# Patient Record
Sex: FEMALE | Race: WHITE | Employment: FULL TIME | URBAN - METROPOLITAN AREA
[De-identification: names, ages, dates, MRNs, and addresses within clinical notes are randomized per-mention and may not be internally consistent; named-entity substitution may affect disease eponyms.]

---

## 2017-01-25 ENCOUNTER — TELEPHONE (OUTPATIENT)
Dept: PEDIATRICS | Facility: CLINIC | Age: 57
End: 2017-01-25

## 2017-01-25 NOTE — TELEPHONE ENCOUNTER
Pt calling back, updated PHQ over the phone. Sending to  as MORENA.    PHQ-9 ([employer]) 2/4/2016 6/27/2016 8/31/2016 10/19/2016 1/25/2017   PHQ-9 Total Score 4 13 17 17 12     Bruna, RN  Triage Nurse

## 2017-01-25 NOTE — TELEPHONE ENCOUNTER
Panel Management Review      Patient has the following on her problem list:     Depression / Dysthymia review  PHQ-9 SCORE 6/27/2016 8/31/2016 10/19/2016   Total Score - - -   Total Score 13 17 17      Patient is due for:  PHQ9    Diabetes    ASA: Not Required     Last A1C  A1C      7.6   11/14/2016  A1C      6.4   6/23/2016  A1C      7.5   12/11/2015  A1C      7.2   11/11/2015  A1C      7.4   5/4/2015  A1C tested: Failed    Last LDL:    CHOL      114   6/23/2016  HDL       38   6/23/2016  LDL       37   6/23/2016  LDL      110   5/4/2015  TRIG      195   6/23/2016  CHOLHDLRATIO      5.1   5/4/2015  NHDL       76   6/23/2016    Is the patient on a Statin? YES             Is the patient on Aspirin? NO    Medications     HMG CoA Reductase Inhibitors    atorvastatin (LIPITOR) 20 MG tablet          Last three blood pressure readings:  BP Readings from Last 3 Encounters:   11/14/16 110/70   08/31/16 122/74   07/25/16 114/76       Date of last diabetes office visit: 11/14/16     Tobacco History:     History   Smoking status     Current Every Day Smoker -- 0.00 packs/day for 35 years     Types: Cigarettes   Smokeless tobacco     Never Used     Comment: 1/2 PPD - started at age 14             Composite cancer screening  Chart review shows that this patient is due/due soon for the following None  Summary:    Patient is due/failing the following:   PHQ9    Action needed:   Patient needs to do PHQ9.    Type of outreach:    Phone, left message for patient to call back.     Questions for provider review:    None                                                                                                                                    Janina Aldana MA 1/25/2017 3:50 PM       Chart routed to Care Team .

## 2017-01-26 ASSESSMENT — PATIENT HEALTH QUESTIONNAIRE - PHQ9: SUM OF ALL RESPONSES TO PHQ QUESTIONS 1-9: 12

## 2017-05-08 ENCOUNTER — OFFICE VISIT (OUTPATIENT)
Dept: PEDIATRICS | Facility: CLINIC | Age: 57
End: 2017-05-08
Payer: COMMERCIAL

## 2017-05-08 VITALS
WEIGHT: 132.5 LBS | BODY MASS INDEX: 22.08 KG/M2 | OXYGEN SATURATION: 99 % | HEART RATE: 84 BPM | DIASTOLIC BLOOD PRESSURE: 62 MMHG | SYSTOLIC BLOOD PRESSURE: 112 MMHG | RESPIRATION RATE: 14 BRPM | TEMPERATURE: 97.4 F | HEIGHT: 65 IN

## 2017-05-08 DIAGNOSIS — F41.9 ANXIETY: ICD-10-CM

## 2017-05-08 DIAGNOSIS — G43.909 MIGRAINE WITHOUT STATUS MIGRAINOSUS, NOT INTRACTABLE, UNSPECIFIED MIGRAINE TYPE: Primary | ICD-10-CM

## 2017-05-08 PROCEDURE — 96372 THER/PROPH/DIAG INJ SC/IM: CPT | Performed by: NURSE PRACTITIONER

## 2017-05-08 PROCEDURE — 99214 OFFICE O/P EST MOD 30 MIN: CPT | Mod: 25 | Performed by: NURSE PRACTITIONER

## 2017-05-08 RX ORDER — KETOROLAC TROMETHAMINE 30 MG/ML
60 INJECTION, SOLUTION INTRAMUSCULAR; INTRAVENOUS ONCE
Qty: 2 ML | Refills: 0 | OUTPATIENT
Start: 2017-05-08 | End: 2017-05-08

## 2017-05-08 RX ORDER — LORAZEPAM 0.5 MG/1
0.5 TABLET ORAL DAILY
Qty: 8 TABLET | Refills: 0 | Status: SHIPPED | OUTPATIENT
Start: 2017-05-08 | End: 2019-09-18

## 2017-05-08 RX ORDER — NAPROXEN 500 MG/1
500 TABLET ORAL
Qty: 60 TABLET | Refills: 1 | Status: SHIPPED | OUTPATIENT
Start: 2017-05-08 | End: 2019-02-12

## 2017-05-08 NOTE — NURSING NOTE
"Chief Complaint   Patient presents with     Headache       Initial There were no vitals taken for this visit. Estimated body mass index is 23.33 kg/(m^2) as calculated from the following:    Height as of 11/14/16: 5' 4.75\" (1.645 m).    Weight as of 11/14/16: 139 lb 2 oz (63.1 kg).  Medication Reconciliation: complete   Addie Arredondo CMA      "

## 2017-05-08 NOTE — NURSING NOTE
MEDICATION: Ketorolac Tromethamine 60MG/2ML (30 mg/mL) (Toradol)  ROUTE: IM  SITE: LUQ - Gluteus  DOSE: 60mg/2ml  LOT #: [id number]  :  [employer]  EXPIRATION DATE:  02/2018  NDC#: 85749-870-81  Addie Arredondo CMA     Patient tolerated injection well.

## 2017-05-08 NOTE — PROGRESS NOTES
"  SUBJECTIVE:                                                    Bharati Villarreal is a 56 year old female who presents to clinic today for the following health issues:    Headaches      Duration: Intermittent and ongoing since the 90's. Awoke with a migraine today.     Description  Location: Varies as to location, often the left back of the head  Character: Throbbing pain, dull pain, sharp pain, squeezing pain. Photophobic today.   Frequency:  Varies, more frequently than in the past. Pattern has been 4-5 headaches per month. More recently has been experiencing headaches daily.  Some headaches are migraines and some are not migraine type headache. Taking Excedrin Migraine, sumatriptan or ibuprofen almost every day.  Has Tylenol #3 on hand but tries not to take that medication for a migraine.    Duration:  Today's headache began at approximately 0630.    Intensity:  Severe, 4-5/10 today    Accompanying signs and symptoms:    Precipitating or Alleviating factors:  Nausea/vomiting: sometimes  Dizziness: Sometimes. Experiencing mild dizziness today.   Weakness or numbness: sometimes weakness, no numbness  Visual changes: flashing lights and blind spots (scotoma). No visual symptoms today.  Fever: no   Sinus or URI symptoms:  No     History  Head trauma: No  Family history of migraines: YES  Previous tests for headaches: No  Neurologist evaluations: No  Able to do daily activities when headache present: No  Wake with headaches: No   Daily pain medication use: No   Any changes in: None    Precipitating or Alleviating factors (light/sound/sleep/caffeine): None    Therapies tried and outcome:  Excedrin Migraine, Imitrex  Outcome - effective if able to \"Catch\" them on time. Took Excedrin Migraine at 0700 today without reduction of migraine pain.  Frequent/daily pain medication use: Almost daily analgesic use - Excedrin Migraine, Sumatriptan, Tylenol, Ibuprofen or on rare occasion, Tylenol #3.        Abnormal Mood " Symptoms      Duration: Two months    Description:  Depression: No   Anxiety: YES  Panic attacks: no      Accompanying signs and symptoms: see PHQ-9 and CHANO scores    History (similar episodes/previous evaluation): Has a history of anxiety which has been quiescent until recently. No known trigger for worsening anxiety.    Precipitating or alleviating factors: None    Therapies tried and outcome: Ativan (Lorezepam) -Took one tablet each day for 3 days last week.        Problem list and histories reviewed & adjusted, as indicated.  Additional history: as documented    Patient Active Problem List   Diagnosis     Insomnia     GERD (gastroesophageal reflux disease)     Hyperlipidemia with target LDL less than 100     Anxiety     Psoriasis     Tobacco abuse     Moderate episode of recurrent major depressive disorder (H)     Migraine with aura and without status migrainosus, not intractable     Type 2 diabetes mellitus without complication, without long-term current use of insulin (H)     Past Surgical History:   Procedure Laterality Date     ARTHROSCOPY SHOULDER RT/LT      forzen should repair RIGHT     AS ABLATION, ENDOMETRIAL, THERMAL, W/O HYSTEROSCOPIC GUIDANCE       HYSTERECTOMY VAGINAL, BILATERAL SALPINGO-OOPHERECTOMY, COMBINED         Social History   Substance Use Topics     Smoking status: Current Every Day Smoker     Packs/day: 0.00     Years: 35.00     Types: Cigarettes     Smokeless tobacco: Never Used      Comment:  PPD - started at age 14     Alcohol use No     Family History   Problem Relation Age of Onset     DIABETES Other      Cancer - colorectal Father 72      of throat cancer  smoker      CANCER Father      Other Cancer Father      Family History Negative Mother      CANCER Maternal Grandmother      Colon Cancer Paternal Grandmother          Current Outpatient Prescriptions   Medication Sig Dispense Refill     naproxen (NAPROSYN) 500 MG tablet Take 1 tablet (500 mg) by mouth at onset of headache  for moderate pain or headaches 60 tablet 1     LORazepam (ATIVAN) 0.5 MG tablet Take 1 tablet (0.5 mg) by mouth daily 8 tablet 0     zolpidem (AMBIEN) 5 MG tablet Take 1 tablet (5 mg) by mouth nightly as needed for sleep 90 tablet 1     venlafaxine (EFFEXOR-XR) 75 MG 24 hr capsule Take 1 to 2 capsules per day 60 capsule 4     prazosin (MINIPRESS) 1 MG capsule Take 1 capsule at bedtime for 3 days, then increase to 2-3 at bedtime as tolerated 75 capsule 3     nicotine (NICODERM CQ) 14 MG/24HR patch 2h hr Place 1 patch onto the skin every 24 hours 30 patch 0     nicotine (NICODERM CQ) 7 MG/24HR patch 2h hr Place 1 patch onto the skin every 24 hours 30 patch 0     SUMAtriptan (IMITREX) 100 MG tablet Take 1 tablet (100 mg) by mouth at onset of headache for migraine 18 tablet 3     canagliflozin (INVOKANA) 100 MG tablet Take 1 tablet (100 mg) by mouth every morning (before breakfast) 90 tablet 3     omeprazole (PRILOSEC) 20 MG capsule Take 1 capsule (20 mg) by mouth daily 90 capsule 3     atorvastatin (LIPITOR) 20 MG tablet Take 1 tablet (20 mg) by mouth daily 90 tablet 1     metFORMIN (GLUCOPHAGE) 500 MG tablet 1 am / 1 lunch / 2 dinner 360 tablet 1     blood glucose monitoring (NO BRAND SPECIFIED) test strip Use to test blood sugar 3  times daily or as directed.   Use to test 1 strip by in vitro route three times daily 2 Box 11     blood glucose monitoring (TriCipher CONTOUR MONITOR) meter device kit Use to test blood sugars 1 times daily or as directed. 1 kit 0     ACE NOT PRESCRIBED, INTENTIONAL, ACE Inhibitor not prescribed due to Refusal by patient NO PROTEINURIA 0 each 0     acyclovir (ZOVIRAX) 5 % ointment Apply topically 6 times daily 15 g 3     glucose blood VI test strips strip 1 strip by In Vitro route 3 times daily 100 strip 3     IBUPROFEN PM for sleep       Allergies   Allergen Reactions     Sulfa Drugs Rash       ROS:  Constitutional, HEENT, cardiovascular, pulmonary, GI and  systems are negative, except as  "otherwise noted.    OBJECTIVE:                                                    /62  Pulse 84  Temp 97.4  F (36.3  C) (Oral)  Resp 14  Ht 5' 4.75\" (1.645 m)  Wt 132 lb 8 oz (60.1 kg)  SpO2 99%  BMI 22.22 kg/m2  Body mass index is 22.22 kg/(m^2).     GENERAL: Healthy, alert and no distress.  Appears to experiencing pain.  EYES: Eyes grossly normal to inspection, PERRL and conjunctivae and sclerae normal  HENT: Ear canals and TM's normal, nose and mouth without ulcers or lesions  NECK: Supple, no adenopathy, no asymmetry, or masses  RESP: Lungs clear to auscultation - no rales, rhonchi or wheezes  CV: Regular rate and rhythm, normal S1 S2, no S3 or S4, no murmur, click or rub  SKIN: No suspicious lesions or rashes  NEURO: Normal strength and tone, mentation intact, oriented times three and cranial nerves 2-12 intact  PSYCH: Mentation appears normal.    Diagnostic Test Results:  None      ASSESSMENT:                                                    Migraine headache  Anxiety, exacerbation    PLAN:                                                      Ketorolac 60 mg IM injection once - administered by nursing staff member.  Pt reports excellent reduction of migraine headache pain approximately 35 minutes post injection.    Refilled Ativan (lorazepam) for a total of ten (#10) tablets. No refills.  Follow up with PCP in the next week regarding exacerbation of anxiety and increasing frequency of migraines. Emphasized the importance of follow up with PCP.       LAZARO Montoya, CNP  [facility]    "

## 2017-05-10 ENCOUNTER — TELEPHONE (OUTPATIENT)
Dept: PEDIATRICS | Facility: CLINIC | Age: 57
End: 2017-05-10

## 2017-05-10 NOTE — TELEPHONE ENCOUNTER
Faxed forms back to [employer] Fax# [phone]. Mailed original forms back to pt  Janina Aldana MA 5/10/2017 9:25 AM

## 2017-06-06 ENCOUNTER — TELEPHONE (OUTPATIENT)
Dept: PEDIATRICS | Facility: CLINIC | Age: 57
End: 2017-06-06

## 2017-06-06 NOTE — TELEPHONE ENCOUNTER
Panel Management Review      Patient has the following on her problem list:     Depression / Dysthymia review  PHQ-9 SCORE 8/31/2016 10/19/2016 1/25/2017   Total Score - - -   Total Score 17 17 12      Patient is due for:  None    Diabetes    ASA: Not Required     Last A1C  Lab Results   Component Value Date    A1C 7.6 11/14/2016    A1C 6.4 06/23/2016    A1C 7.5 12/11/2015    A1C 7.2 11/11/2015    A1C 7.4 05/04/2015     A1C tested: FAILED    Last LDL:    Lab Results   Component Value Date    CHOL 114 06/23/2016     Lab Results   Component Value Date    HDL 38 06/23/2016     Lab Results   Component Value Date    LDL 37 06/23/2016     Lab Results   Component Value Date    TRIG 195 06/23/2016     Lab Results   Component Value Date    CHOLHDLRATIO 5.1 05/04/2015     Lab Results   Component Value Date    NHDL 76 06/23/2016       Is the patient on a Statin? YES             Is the patient on Aspirin? NO    Medications     HMG CoA Reductase Inhibitors    atorvastatin (LIPITOR) 20 MG tablet          Last three blood pressure readings:  BP Readings from Last 3 Encounters:   05/08/17 112/62   11/14/16 110/70   08/31/16 122/74       Date of last diabetes office visit: 11/14/16     Tobacco History:     History   Smoking Status     Current Every Day Smoker     Packs/day: 0.00     Years: 35.00     Types: Cigarettes   Smokeless Tobacco     Never Used     Comment: 1/2 PPD - started at age 14           Composite cancer screening  Chart review shows that this patient is due/due soon for the following None  Summary:    Patient is due/failing the following:   A1C and FOLLOW UP    Action needed:   Patient needs office visit for Depression Follow up, Diabetes Follow up.    Type of outreach:    Phone, spoke to patient.  States she will call back to schedule an appt    Questions for provider review:    None                                                                                                                                     Janina Aldana MA 6/6/2017 10:29 AM       Chart routed to Close Encounter .

## 2017-06-12 ENCOUNTER — TELEPHONE (OUTPATIENT)
Dept: PEDIATRICS | Facility: CLINIC | Age: 57
End: 2017-06-12

## 2017-06-12 NOTE — TELEPHONE ENCOUNTER
Called, spoke to pt and informed forms needs to be completed by her 's physician. Pt states he has appt with Dr. Freedman tomorrow and will address the forms then. Forms placed in Dr. Freedman's out basket until appt    Janina Aldana MA 6/12/2017 4:11 PM

## 2017-06-19 ENCOUNTER — TELEPHONE (OUTPATIENT)
Dept: PEDIATRICS | Facility: CLINIC | Age: 57
End: 2017-06-19

## 2017-06-19 NOTE — TELEPHONE ENCOUNTER
Called and left message to call clinic back. Please inform pt that Forms are completed best to Dr. Freedman's ability and there are still portions that pt needs to fill out, wondering if pt would like to pick forms up at .    Forms placed in dr. Freedman's out basket until we hear back from pt  Janina Aldana MA 6/19/2017 9:54 AM

## 2017-06-20 NOTE — TELEPHONE ENCOUNTER
Patient returned called, she request to  forms at .  I brought them down to .    Thank you,    Valorie Estrella

## 2017-06-23 ENCOUNTER — OFFICE VISIT (OUTPATIENT)
Dept: PEDIATRICS | Facility: CLINIC | Age: 57
End: 2017-06-23
Payer: COMMERCIAL

## 2017-06-23 VITALS
OXYGEN SATURATION: 96 % | BODY MASS INDEX: 22.19 KG/M2 | HEART RATE: 80 BPM | SYSTOLIC BLOOD PRESSURE: 110 MMHG | DIASTOLIC BLOOD PRESSURE: 66 MMHG | RESPIRATION RATE: 16 BRPM | TEMPERATURE: 97.7 F | WEIGHT: 133.2 LBS | HEIGHT: 65 IN

## 2017-06-23 DIAGNOSIS — H72.92 EARDRUM RUPTURE, LEFT: ICD-10-CM

## 2017-06-23 DIAGNOSIS — H81.10 BPPV (BENIGN PAROXYSMAL POSITIONAL VERTIGO), UNSPECIFIED LATERALITY: Primary | ICD-10-CM

## 2017-06-23 PROCEDURE — 99214 OFFICE O/P EST MOD 30 MIN: CPT | Performed by: PHYSICIAN ASSISTANT

## 2017-06-23 RX ORDER — MECLIZINE HYDROCHLORIDE 25 MG/1
25 TABLET ORAL EVERY 6 HOURS PRN
Qty: 30 TABLET | Refills: 1 | Status: SHIPPED | OUTPATIENT
Start: 2017-06-23 | End: 2019-09-18

## 2017-06-23 RX ORDER — FLUTICASONE PROPIONATE 50 MCG
1-2 SPRAY, SUSPENSION (ML) NASAL DAILY
Qty: 1 BOTTLE | Refills: 11 | Status: SHIPPED | OUTPATIENT
Start: 2017-06-23

## 2017-06-23 RX ORDER — OFLOXACIN 3 MG/ML
SOLUTION/ DROPS OPHTHALMIC
Qty: 1 BOTTLE | Refills: 0 | Status: SHIPPED | OUTPATIENT
Start: 2017-06-23 | End: 2017-06-23

## 2017-06-23 NOTE — PROGRESS NOTES
"  SUBJECTIVE:                                                    Bharati Villarreal is a 57 year old female who presents to clinic today for the following health issues:      Acute Illness   Acute illness concerns: URI for 2 weeks over the last week settled in left ear  Onset:    Fever: no     Chills/Sweats: no     Headache (location?): YES    Sinus Pressure:no    Conjunctivitis:  YES: left-matted    Ear Pain: YES: left ear-ringing, pressure,echo, some lightheadedness with position change    Rhinorrhea: no     Congestion: no     Sore Throat: no      Cough: no    Wheeze: no     Decreased Appetite: YES    Nausea: YES    Vomiting: no     Diarrhea:  no     Dysuria/Freq.: no     Fatigue/Achiness: YES- fatigue    Sick/Strep Exposure: no      Therapies Tried and outcome: none     ROS:  ROS negative except as listed above      OBJECTIVE:     /66 (BP Location: Right arm, Patient Position: Chair, Cuff Size: Adult Regular)  Pulse 80  Temp 97.7  F (36.5  C) (Oral)  Resp 16  Ht 5' 4.75\" (1.645 m)  Wt 133 lb 3.2 oz (60.4 kg)  SpO2 96%  BMI 22.34 kg/m2  Body mass index is 22.34 kg/(m^2).  GENERAL: healthy, alert and minimal distress  EYES: Eyes grossly normal to inspection, PERRL and conjunctivae and sclerae normal  HENT: Left TM ruptured at malleus.  Ear canals normal, nose and mouth without ulcers or lesions  NECK: no adenopathy  RESP: lungs clear to auscultation - no rales, rhonchi or wheezes  CV: regular rate and rhythm  ABDOMEN: soft, nontender, no hepatosplenomegaly, no masses and bowel sounds normal  MS: no gross musculoskeletal defects noted  SKIN: no suspicious lesions or rashes  NEURO: Normal strength and tone, mentation/balance intact and speech normal.  Negative romberg. Episodic left sided vertigo with miko halpike  BACK: no CVA tenderness, no paralumbar tenderness    Diagnostic Test Results:  NONE    ASSESSMENT/PLAN:   (H81.10) BPPV (benign paroxysmal positional vertigo), unspecified laterality  (primary " encounter diagnosis)  Comment: left sided symptoms  Plan: meclizine (ANTIVERT) 25 MG tablet       Exercises provided   Avoid driving etc while symptomatic    (H72.92) Eardrum rupture, left  Comment: No evidence of infection  Plan: OTOLARYNGOLOGY REFERRAL, fluticasone (FLONASE)         50 MCG/ACT spray  Avoid submersion in water         Shaheed Garvey PA-C  [facility]

## 2017-06-23 NOTE — MR AVS SNAPSHOT
After Visit Summary   6/23/2017    Bharati Villarreal    MRN: [id number]           Patient Information     Date Of Birth          1960        Visit Information        Provider Department      6/23/2017 1:00 PM Shaheed Garvey PA-C [facility]        Today's Diagnoses     BPPV (benign paroxysmal positional vertigo), unspecified laterality    -  1    Acute suppurative otitis media of left ear with spontaneous rupture of tympanic membrane, recurrence not specified          Care Instructions      Benign Paroxysmal Positional Vertigo    Benign paroxysmal positional vertigo is a common condition. You feel as if the room is spinning after changing position, moving your head quickly, or even just rolling over in bed.  Vertigo is a false feeling of motion plus disorientation that makes it seem as though the room is spinning. A vertigo attack may cause sudden nausea, vomiting, and heavy sweating. Severe vertigo causes a loss of balance. You may even fall down.  Vertigo is caused by a problem with the inner ear. The inner ear is located behind the middle ear. It is a part of the balance center of the body. It contains small calcium particles within fluid-filled canals (semi-circular canals). These particles can move out of position as a result of aging, head trauma, or disease of the inner ear. Once that happens, moving your head in certain ways may cause the particles to stimulate the inner ear. This creates the feeling of vertigo.  An episode of vertigo may last seconds, minutes, or hours. Once you are over the first episode of vertigo, it may never return. Sometimes symptoms return off and on over several weeks or longer.  Home care  Follow these guidelines when caring for yourself at home:    Rest quietly in bed if your symptoms are severe. Change position slowly. There is usually one position that will feel best. This might be lying on one side or lying on your back with your head  slightly raised on pillows.    Do not drive or work with dangerous machinery for one week after symptoms disappear. This is in case symptoms return suddenly.    Take medicine as prescribed to relieve your symptoms. Unless another medicine was prescribed for nausea, vomiting, and vertigo, you may use over-the-counter motion sickness medicine, such as meclizine or dimenhydrinate.  Follow-up care  Follow up with your healthcare provider, or as directed. Tell your provider about any ringing in the ear or hearing loss.  If you had a CT or MRI scan, a specialist will review it. You will be told of any new findings that may affect your care.  When to seek medical advice  Call your healthcare provider right away if any of these occur:    Vertigo gets worse even after taking prescribed medicine    Repeated vomiting even after taking prescribed medicine    Increased weakness or fainting    Severe headache or unusual drowsiness or confusion    Weakness of an arm or leg or one side of the face    Difficulty walking    Difficulty with speech or vision    Seizure    Trouble hearing    Fever of 100.4 F (38 C) or higher, or as directed by your healthcare provider    Fast heart rate    Chest pain   Date Last Reviewed: 7/10/2015    9094-0831 [employer]. [address] [place] [address]. All rights reserved. This information is not intended as a substitute for professional medical care. Always follow your healthcare professional's instructions.        Labyrinthitis    The inner ear is located behind the middle ear. It is part of the balance center of your body. When the inner ear becomes irritated or inflamed it causes a condition known as labyrinthitis. It may due to a viral infection, but often a cause is not found. Labyrinthitis causes sudden dizziness and balance problems. It often causes a feeling that you or the room is spinning (vertigo). You may feel nauseated or throw up. You may also feel a loss of  balance when trying to walk. Head movement from side to side or changes in body position (from lying to sitting or standing) may worsen symptoms. You may have ringing in the ear. Hearing may also be affected.  An episode of labyrinthitis may last seconds, minutes or hours. It may never return. Or symptoms may recur off and on over several weeks or longer. In many cases, the problem is short-term and goes away when the inner ear issue resolves.  Home care    Take medicine as prescribed to relieve your symptoms. Unless another medicine was prescribed, you can try over-the-counter motion sickness pills. Note that these medicines may cause drowsiness.    If symptoms are severe, rest quietly in bed. Change positions slowly. There may be one position feels best, such as lying on one side or lying on your back with your head slightly raised on pillows.    Vestibular rehabilitation exercises involve moving the head to help resolve problems in the inner ear. If these exercises have been prescribed, do them as you have been instructed.    Do not drive or work with dangerous machinery until one week has passed without symptoms. Be cautious about using stairs.  Follow-up care  Follow up with your healthcare provider or as advised by our staff.  When to seek medical advice  Call your healthcare provider for any of the following:    Symptoms that are not controlled by medicine or that get worse    Repeated vomiting that is not relieved by medicine    Increased weakness or fainting    Headache or unusual drowsiness    Difficulty with vision or speech    Trouble moving the face, arms or legs    Hearing loss    Symptoms persist beyond a few days  Date Last Reviewed: 4/26/2015 2000-2017 [employer]. [address] [place] [address], [place] [address]. All rights reserved. This information is not intended as a substitute for professional medical care. Always follow your healthcare professional's instructions.        Eardrum  Rupture (Perforation)    Your eardrum is a thin membrane between your outer and middle ear. Sound waves entering your ear cause the membrane to vibrate. This helps you hear. An injury or infection can cause your eardrum to tear (rupture). This creates a hole (perforation) that may affect your hearing.  Causes of eardrum perforation  Causes of a ruptured eardrum include:    Pressure from an ear infection    Putting an object such as a cotton swab or pencil into the ear    A very loud noise such as a gunshot close to the ear    Rapid changes in air pressure. These can happen during scuba diving or traveling at high altitudes.    A slap or blow to the ear  When to go to the emergency room (ER)  Seek medical care right away if you:    Have severe pain, bleeding, or ringing in your ear.    Lose your hearing suddenly.    Become very dizzy for no reason.    Have an object lodged in your ear.  A ruptured eardrum from an ear infection usually isn't an emergency. In fact, the rupture often relieves pressure and pain. It usually heals within hours or days. But you should have the ear looked at by a healthcare provider within 24 hours.  What to expect in the ER  Your ear will be examined. Treatment will depend on how severe the damage is. Small holes often heal on their own. A small patch may be placed over a minor eardrum tear. Large tears may need to be repaired during an operation. If you are very dizzy or have severe hearing loss, you are likely to stay in the hospital for treatment for one or more days.  Don't clean inside the ear canal with cotton swabs or any other object.   Date Last Reviewed: 10/1/2016    0992-5671 [employer]. [address] [place] [address]. All rights reserved. This information is not intended as a substitute for professional medical care. Always follow your healthcare professional's instructions.                Follow-ups after your visit        Additional Services      OTOLARYNGOLOGY REFERRAL       Your provider has referred you to: [facility]: [facility] [phone]   [email or web address]    Please be aware that coverage of these services is subject to the terms and limitations of your health insurance plan.  Call member services at your health plan with any benefit or coverage questions.      Please bring the following with you to your appointment:    (1) Any X-Rays, CTs or MRIs which have been performed.  Contact the facility where they were done to arrange for  prior to your scheduled appointment.   (2) List of current medications  (3) This referral request   (4) Any documents/labs given to you for this referral                  Your next 10 appointments already scheduled     Jun 26, 2017  9:30 AM CDT   SHORT with Opal Freedman MD   [facility]    [address] [place] [address] [address]  [facility] [address] [phone]              Who to contact     If you have questions or need follow up information about today's clinic visit or your schedule please contact [facility] directly at [phone].  Normal or non-critical lab and imaging results will be communicated to you by MyChart, letter or phone within 4 business days after the clinic has received the results. If you do not hear from us within 7 days, please contact the clinic through MyChart or phone. If you have a critical or abnormal lab result, we will notify you by phone as soon as possible.  Submit refill requests through [employer] or call your pharmacy and they will forward the refill request to us. Please allow 3 business days for your refill to be completed.          Additional Information About Your Visit        [employer] Information     [employer] gives you secure access to your electronic health record. If you see a primary care provider, you can also send messages to your care  "team and make appointments. If you have questions, please call your primary care clinic.  If you do not have a primary care provider, please call [phone] and they will assist you.        Care EveryWhere ID     This is your Care EveryWhere ID. This could be used by other organizations to access your [facility] medical records  [id number]        Your Vitals Were     Pulse Temperature Respirations Height Pulse Oximetry BMI (Body Mass Index)    80 97.7  F (36.5  C) (Oral) 16 5' 4.75\" (1.645 m) 96% 22.34 kg/m2       Blood Pressure from Last 3 Encounters:   06/23/17 110/66   05/08/17 112/62   11/14/16 110/70    Weight from Last 3 Encounters:   06/23/17 133 lb 3.2 oz (60.4 kg)   05/08/17 132 lb 8 oz (60.1 kg)   11/14/16 139 lb 2 oz (63.1 kg)              We Performed the Following     OTOLARYNGOLOGY REFERRAL          Today's Medication Changes          These changes are accurate as of: 6/23/17  2:10 PM.  If you have any questions, ask your nurse or doctor.               Start taking these medicines.        Dose/Directions    fluticasone 50 MCG/ACT spray   Commonly known as:  FLONASE   Used for:  Acute suppurative otitis media of left ear with spontaneous rupture of tympanic membrane, recurrence not specified   Started by:  Shaheed Garvey PA-C        Dose:  1-2 spray   Spray 1-2 sprays into both nostrils daily   Quantity:  1 Bottle   Refills:  11       meclizine 25 MG tablet   Commonly known as:  ANTIVERT   Used for:  BPPV (benign paroxysmal positional vertigo), unspecified laterality   Started by:  Shaheed Garvey PA-C        Dose:  25 mg   Take 1 tablet (25 mg) by mouth every 6 hours as needed for dizziness   Quantity:  30 tablet   Refills:  1            Where to get your medicines      These medications were sent to [facility] - [address] [facility]   [address], [place] [address]     Phone:  [phone]     fluticasone 50 MCG/ACT spray "    meclizine 25 MG tablet                Primary Care Provider Office Phone # Fax #    Opal Freedman [phone]       [facility] ([facility] [address] [place] [address] [address] [place] [place] [address]        Equal Access to Services     CAROLYN MAYEN : Hadii aad ku hadadrianeo Soomaali, waaxda luqadaha, qaybta kaalmada adeegyada, waxsusan vaughnn chandana giraldo lakoopal ferrara. So [facility] [phone].    ATENCIÓN: Si habla español, tiene a galvan disposición servicios gratuitos de asistencia lingüística. Llame al [phone].    We comply with applicable federal civil rights laws and [place] laws. We do not discriminate on the basis of race, color, national origin, age, disability sex, sexual orientation or gender identity.            Thank you!     [facility]  [facility] your [facility]. Our goal is always to provide you with excellent care. Hearing back from our patients is one way we can continue to improve our services. Please take a few minutes to complete the written survey that you may receive in the mail after your visit with us. Thank you!             Your Updated Medication List - Protect others around you: Learn how to safely use, store and throw away your medicines at www.disposemymeds.org.          This list is accurate as of: 6/23/17  2:10 PM.  Always use your most recent med list.                   Brand Name Dispense Instructions for use Diagnosis    ACE NOT PRESCRIBED (INTENTIONAL)     0 each    ACE Inhibitor not prescribed due to Refusal by patient NO PROTEINURIA    Needs smoking cessation education       acyclovir 5 % ointment    ZOVIRAX    15 g    Apply topically 6 times daily    Cold sore       atorvastatin 20 MG tablet    LIPITOR    90 tablet    Take 1 tablet (20 mg) by mouth daily    Mixed hyperlipidemia       blood glucose monitoring meter device kit     1 kit    Use to test blood sugars 1 times daily or as directed.    Type 2 diabetes mellitus without complication (H)       *  blood glucose monitoring test strip    no brand specified    100 strip    1 strip by In Vitro route 3 times daily    Diabetes mellitus, type 2 (H)       * blood glucose monitoring test strip    no brand specified    2 Box    Use to test blood sugar 3  times daily or as directed.   Use to test 1 strip by in vitro route three times daily    Type 2 diabetes mellitus without complication (H)       canagliflozin 100 MG tablet    INVOKANA    90 tablet    Take 1 tablet (100 mg) by mouth every morning (before breakfast)    Type 2 diabetes mellitus without complication (H)       fluticasone 50 MCG/ACT spray    FLONASE    1 Bottle    Spray 1-2 sprays into both nostrils daily    Acute suppurative otitis media of left ear with spontaneous rupture of tympanic membrane, recurrence not specified       IBUPROFEN      PM for sleep        LORazepam 0.5 MG tablet    ATIVAN    8 tablet    Take 1 tablet (0.5 mg) by mouth daily    Anxiety       meclizine 25 MG tablet    ANTIVERT    30 tablet    Take 1 tablet (25 mg) by mouth every 6 hours as needed for dizziness    BPPV (benign paroxysmal positional vertigo), unspecified laterality       metFORMIN 500 MG tablet    GLUCOPHAGE    360 tablet    TAKE 1 TABLET BY MOUTH EVERY MORNING, TAKE 1 TABLET DAILY WITH LUNCH AND TAKE 2 TABLETS WITH DINNER    Type 2 diabetes mellitus without complication, without long-term current use of insulin (H)       naproxen 500 MG tablet    NAPROSYN    60 tablet    Take 1 tablet (500 mg) by mouth at onset of headache for moderate pain or headaches    Migraine without status migrainosus, not intractable, unspecified migraine type       * nicotine 14 MG/24HR 24 hr patch    NICODERM CQ    30 patch    Place 1 patch onto the skin every 24 hours    Tobacco use disorder       * nicotine 7 MG/24HR 24 hr patch    NICODERM CQ    30 patch    Place 1 patch onto the skin every 24 hours    Tobacco use disorder       omeprazole 20 MG CR capsule    priLOSEC    90 capsule    Take  1 capsule (20 mg) by mouth daily    Gastroesophageal reflux disease without esophagitis       prazosin 1 MG capsule    MINIPRESS    75 capsule    Take 1 capsule at bedtime for 3 days, then increase to 2-3 at bedtime as tolerated    PTSD (post-traumatic stress disorder)       SUMAtriptan 100 MG tablet    IMITREX    18 tablet    Take 1 tablet (100 mg) by mouth at onset of headache for migraine    Migraine with aura and without status migrainosus, not intractable       venlafaxine 75 MG 24 hr capsule    EFFEXOR-XR    60 capsule    Take 1 to 2 capsules per day    Major depressive disorder, recurrent episode, moderate (H)       zolpidem 5 MG tablet    AMBIEN    90 tablet    Take 1 tablet (5 mg) by mouth nightly as needed for sleep    Primary insomnia       * Notice:  This list has 4 medication(s) that are the same as other medications prescribed for you. Read the directions carefully, and ask your doctor or other care provider to review them with you.

## 2017-06-23 NOTE — PATIENT INSTRUCTIONS
Benign Paroxysmal Positional Vertigo    Benign paroxysmal positional vertigo is a common condition. You feel as if the room is spinning after changing position, moving your head quickly, or even just rolling over in bed.  Vertigo is a false feeling of motion plus disorientation that makes it seem as though the room is spinning. A vertigo attack may cause sudden nausea, vomiting, and heavy sweating. Severe vertigo causes a loss of balance. You may even fall down.  Vertigo is caused by a problem with the inner ear. The inner ear is located behind the middle ear. It is a part of the balance center of the body. It contains small calcium particles within fluid-filled canals (semi-circular canals). These particles can move out of position as a result of aging, head trauma, or disease of the inner ear. Once that happens, moving your head in certain ways may cause the particles to stimulate the inner ear. This creates the feeling of vertigo.  An episode of vertigo may last seconds, minutes, or hours. Once you are over the first episode of vertigo, it may never return. Sometimes symptoms return off and on over several weeks or longer.  Home care  Follow these guidelines when caring for yourself at home:    Rest quietly in bed if your symptoms are severe. Change position slowly. There is usually one position that will feel best. This might be lying on one side or lying on your back with your head slightly raised on pillows.    Do not drive or work with dangerous machinery for one week after symptoms disappear. This is in case symptoms return suddenly.    Take medicine as prescribed to relieve your symptoms. Unless another medicine was prescribed for nausea, vomiting, and vertigo, you may use over-the-counter motion sickness medicine, such as meclizine or dimenhydrinate.  Follow-up care  Follow up with your healthcare provider, or as directed. Tell your provider about any ringing in the ear or hearing loss.  If you had a CT  or MRI scan, a specialist will review it. You will be told of any new findings that may affect your care.  When to seek medical advice  Call your healthcare provider right away if any of these occur:    Vertigo gets worse even after taking prescribed medicine    Repeated vomiting even after taking prescribed medicine    Increased weakness or fainting    Severe headache or unusual drowsiness or confusion    Weakness of an arm or leg or one side of the face    Difficulty walking    Difficulty with speech or vision    Seizure    Trouble hearing    Fever of 100.4 F (38 C) or higher, or as directed by your healthcare provider    Fast heart rate    Chest pain   Date Last Reviewed: 7/10/2015    2507-5128 [employer]. [address] [place] [address], [place] [address]. All rights reserved. This information is not intended as a substitute for professional medical care. Always follow your healthcare professional's instructions.        Labyrinthitis    The inner ear is located behind the middle ear. It is part of the balance center of your body. When the inner ear becomes irritated or inflamed it causes a condition known as labyrinthitis. It may due to a viral infection, but often a cause is not found. Labyrinthitis causes sudden dizziness and balance problems. It often causes a feeling that you or the room is spinning (vertigo). You may feel nauseated or throw up. You may also feel a loss of balance when trying to walk. Head movement from side to side or changes in body position (from lying to sitting or standing) may worsen symptoms. You may have ringing in the ear. Hearing may also be affected.  An episode of labyrinthitis may last seconds, minutes or hours. It may never return. Or symptoms may recur off and on over several weeks or longer. In many cases, the problem is short-term and goes away when the inner ear issue resolves.  Home care    Take medicine as prescribed to relieve your symptoms. Unless another medicine  was prescribed, you can try over-the-counter motion sickness pills. Note that these medicines may cause drowsiness.    If symptoms are severe, rest quietly in bed. Change positions slowly. There may be one position feels best, such as lying on one side or lying on your back with your head slightly raised on pillows.    Vestibular rehabilitation exercises involve moving the head to help resolve problems in the inner ear. If these exercises have been prescribed, do them as you have been instructed.    Do not drive or work with dangerous machinery until one week has passed without symptoms. Be cautious about using stairs.  Follow-up care  Follow up with your healthcare provider or as advised by our staff.  When to seek medical advice  Call your healthcare provider for any of the following:    Symptoms that are not controlled by medicine or that get worse    Repeated vomiting that is not relieved by medicine    Increased weakness or fainting    Headache or unusual drowsiness    Difficulty with vision or speech    Trouble moving the face, arms or legs    Hearing loss    Symptoms persist beyond a few days  Date Last Reviewed: 4/26/2015 2000-2017 [employer]. [address] [place] [address]. All rights reserved. This information is not intended as a substitute for professional medical care. Always follow your healthcare professional's instructions.        Eardrum Rupture (Perforation)    Your eardrum is a thin membrane between your outer and middle ear. Sound waves entering your ear cause the membrane to vibrate. This helps you hear. An injury or infection can cause your eardrum to tear (rupture). This creates a hole (perforation) that may affect your hearing.  Causes of eardrum perforation  Causes of a ruptured eardrum include:    Pressure from an ear infection    Putting an object such as a cotton swab or pencil into the ear    A very loud noise such as a gunshot close to the ear    Rapid  changes in air pressure. These can happen during scuba diving or traveling at high altitudes.    A slap or blow to the ear  When to go to the emergency room (ER)  Seek medical care right away if you:    Have severe pain, bleeding, or ringing in your ear.    Lose your hearing suddenly.    Become very dizzy for no reason.    Have an object lodged in your ear.  A ruptured eardrum from an ear infection usually isn't an emergency. In fact, the rupture often relieves pressure and pain. It usually heals within hours or days. But you should have the ear looked at by a healthcare provider within 24 hours.  What to expect in the ER  Your ear will be examined. Treatment will depend on how severe the damage is. Small holes often heal on their own. A small patch may be placed over a minor eardrum tear. Large tears may need to be repaired during an operation. If you are very dizzy or have severe hearing loss, you are likely to stay in the hospital for treatment for one or more days.  Don't clean inside the ear canal with cotton swabs or any other object.   Date Last Reviewed: 10/1/2016    1528-0233 [employer]. [address] [place] [address], [place] [address]. All rights reserved. This information is not intended as a substitute for professional medical care. Always follow your healthcare professional's instructions.

## 2017-06-26 ENCOUNTER — OFFICE VISIT (OUTPATIENT)
Dept: PEDIATRICS | Facility: CLINIC | Age: 57
End: 2017-06-26
Payer: COMMERCIAL

## 2017-06-26 VITALS
HEIGHT: 65 IN | TEMPERATURE: 97.6 F | BODY MASS INDEX: 22.16 KG/M2 | DIASTOLIC BLOOD PRESSURE: 66 MMHG | HEART RATE: 104 BPM | WEIGHT: 133 LBS | OXYGEN SATURATION: 100 % | SYSTOLIC BLOOD PRESSURE: 100 MMHG

## 2017-06-26 DIAGNOSIS — F33.1 MODERATE EPISODE OF RECURRENT MAJOR DEPRESSIVE DISORDER (H): ICD-10-CM

## 2017-06-26 DIAGNOSIS — R42 VERTIGO: ICD-10-CM

## 2017-06-26 DIAGNOSIS — E78.2 MIXED HYPERLIPIDEMIA: ICD-10-CM

## 2017-06-26 DIAGNOSIS — G43.109 MIGRAINE WITH AURA AND WITHOUT STATUS MIGRAINOSUS, NOT INTRACTABLE: ICD-10-CM

## 2017-06-26 DIAGNOSIS — K21.9 GASTROESOPHAGEAL REFLUX DISEASE WITHOUT ESOPHAGITIS: ICD-10-CM

## 2017-06-26 DIAGNOSIS — F51.01 PRIMARY INSOMNIA: ICD-10-CM

## 2017-06-26 DIAGNOSIS — E11.9 TYPE 2 DIABETES MELLITUS WITHOUT COMPLICATION, WITHOUT LONG-TERM CURRENT USE OF INSULIN (H): Primary | ICD-10-CM

## 2017-06-26 LAB
ANION GAP SERPL CALCULATED.3IONS-SCNC: 6 MMOL/L (ref 3–14)
BUN SERPL-MCNC: 15 MG/DL (ref 7–30)
CALCIUM SERPL-MCNC: 9.5 MG/DL (ref 8.5–10.1)
CHLORIDE SERPL-SCNC: 108 MMOL/L (ref 94–109)
CHOLEST SERPL-MCNC: 118 MG/DL
CO2 SERPL-SCNC: 25 MMOL/L (ref 20–32)
CREAT SERPL-MCNC: 0.76 MG/DL (ref 0.52–1.04)
CREAT UR-MCNC: 85 MG/DL
GFR SERPL CREATININE-BSD FRML MDRD: 79 ML/MIN/1.7M2
GLUCOSE SERPL-MCNC: 136 MG/DL (ref 70–99)
HBA1C MFR BLD: 7.5 % (ref 4.3–6)
HDLC SERPL-MCNC: 32 MG/DL
LDLC SERPL CALC-MCNC: 20 MG/DL
MICROALBUMIN UR-MCNC: 12 MG/L
MICROALBUMIN/CREAT UR: 14.05 MG/G CR (ref 0–25)
NONHDLC SERPL-MCNC: 86 MG/DL
POTASSIUM SERPL-SCNC: 4.3 MMOL/L (ref 3.4–5.3)
SODIUM SERPL-SCNC: 139 MMOL/L (ref 133–144)
TRIGL SERPL-MCNC: 330 MG/DL

## 2017-06-26 PROCEDURE — 80048 BASIC METABOLIC PNL TOTAL CA: CPT | Performed by: INTERNAL MEDICINE

## 2017-06-26 PROCEDURE — 82043 UR ALBUMIN QUANTITATIVE: CPT | Performed by: INTERNAL MEDICINE

## 2017-06-26 PROCEDURE — 36415 COLL VENOUS BLD VENIPUNCTURE: CPT | Performed by: INTERNAL MEDICINE

## 2017-06-26 PROCEDURE — 80061 LIPID PANEL: CPT | Performed by: INTERNAL MEDICINE

## 2017-06-26 PROCEDURE — 83036 HEMOGLOBIN GLYCOSYLATED A1C: CPT | Performed by: INTERNAL MEDICINE

## 2017-06-26 PROCEDURE — 99214 OFFICE O/P EST MOD 30 MIN: CPT | Performed by: INTERNAL MEDICINE

## 2017-06-26 RX ORDER — ZOLPIDEM TARTRATE 5 MG/1
5 TABLET ORAL
Qty: 30 TABLET | Refills: 5 | Status: SHIPPED | OUTPATIENT
Start: 2017-06-26 | End: 2017-12-28

## 2017-06-26 RX ORDER — VENLAFAXINE 37.5 MG/1
37.5 TABLET ORAL 2 TIMES DAILY
Qty: 180 TABLET | Refills: 3 | Status: SHIPPED | OUTPATIENT
Start: 2017-06-26 | End: 2017-09-27

## 2017-06-26 RX ORDER — ATORVASTATIN CALCIUM 20 MG/1
20 TABLET, FILM COATED ORAL DAILY
Qty: 90 TABLET | Refills: 3 | Status: SHIPPED | OUTPATIENT
Start: 2017-06-26 | End: 2018-07-01

## 2017-06-26 RX ORDER — SUMATRIPTAN 100 MG/1
100 TABLET, FILM COATED ORAL
Qty: 18 TABLET | Refills: 3 | Status: SHIPPED | OUTPATIENT
Start: 2017-06-26 | End: 2019-02-12

## 2017-06-26 ASSESSMENT — ANXIETY QUESTIONNAIRES
7. FEELING AFRAID AS IF SOMETHING AWFUL MIGHT HAPPEN: MORE THAN HALF THE DAYS
2. NOT BEING ABLE TO STOP OR CONTROL WORRYING: SEVERAL DAYS
5. BEING SO RESTLESS THAT IT IS HARD TO SIT STILL: MORE THAN HALF THE DAYS
3. WORRYING TOO MUCH ABOUT DIFFERENT THINGS: SEVERAL DAYS
GAD7 TOTAL SCORE: 12
1. FEELING NERVOUS, ANXIOUS, OR ON EDGE: MORE THAN HALF THE DAYS
IF YOU CHECKED OFF ANY PROBLEMS ON THIS QUESTIONNAIRE, HOW DIFFICULT HAVE THESE PROBLEMS MADE IT FOR YOU TO DO YOUR WORK, TAKE CARE OF THINGS AT HOME, OR GET ALONG WITH OTHER PEOPLE: SOMEWHAT DIFFICULT
6. BECOMING EASILY ANNOYED OR IRRITABLE: MORE THAN HALF THE DAYS

## 2017-06-26 ASSESSMENT — PATIENT HEALTH QUESTIONNAIRE - PHQ9: 5. POOR APPETITE OR OVEREATING: MORE THAN HALF THE DAYS

## 2017-06-26 NOTE — PROGRESS NOTES
SUBJECTIVE:                                                    Bharati Villarreal is a 57 year old female who presents to clinic today for the following health issues:      Diabetes Follow-up    Patient is checking blood sugars: once daily.  Results are as follows:         suppertime - 125-250    Diabetic concerns: None     Symptoms of hypoglycemia (low blood sugar): none     Paresthesias (numbness or burning in feet) or sores: No     Date of last diabetic eye exam: March 2017       Amount of exercise or physical activity: 6-7 days/week for an average of 15-30 minutes    Problems taking medications regularly: No    Medication side effects: none    Diet: regular (no restrictions)      1. Type 2 DM: Bharati comes in for follow-up of her DM. She reports that she is taking 500mg metformin in the AM and 1000mg in the evening, but can't remember to take her lunch time dosing. She is taking Invokana without significant side effects. Blood sugars typically run 125-150, very occasionally over 200 when she eats more sugars.     2. L ear pain: This is improving with drops and Flonase, but she continues to have some ear pain, as well as ringing in the ear. No hearing loss, but she is still having some vertigo symptoms. She is taking meclizine, which helps, but makes her sleepy. She has an appointment scheduled with ENT later this week. No ear drainage.     Problem list and histories reviewed & adjusted, as indicated.  Additional history: as documented    Patient Active Problem List   Diagnosis     Insomnia     GERD (gastroesophageal reflux disease)     Hyperlipidemia with target LDL less than 100     Anxiety     Psoriasis     Tobacco abuse     Moderate episode of recurrent major depressive disorder (H)     Migraine with aura and without status migrainosus, not intractable     Type 2 diabetes mellitus without complication, without long-term current use of insulin (H)     Past Surgical History:   Procedure Laterality Date      "ARTHROSCOPY SHOULDER RT/LT      forzen should repair RIGHT     AS ABLATION, ENDOMETRIAL, THERMAL, W/O HYSTEROSCOPIC GUIDANCE       HYSTERECTOMY VAGINAL, BILATERAL SALPINGO-OOPHERECTOMY, COMBINED         Social History   Substance Use Topics     Smoking status: Current Every Day Smoker     Packs/day: 0.00     Years: 35.00     Types: Cigarettes     Smokeless tobacco: Never Used      Comment:  PPD - started at age 14     Alcohol use No     Family History   Problem Relation Age of Onset     DIABETES Other      Cancer - colorectal Father 72      of throat cancer  smoker      CANCER Father      Other Cancer Father      Family History Negative Mother      CANCER Maternal Grandmother      Colon Cancer Paternal Grandmother            Reviewed and updated as needed this visit by clinical staff  Tobacco  Allergies  Meds  Med Hx  Fam Hx  Soc Hx        ROS:  Constitutional, HEENT, neuro, endo systems are negative, except as otherwise noted.    OBJECTIVE:     /66 (BP Location: Right arm, Patient Position: Chair, Cuff Size: Adult Regular)  Pulse 104  Temp 97.6  F (36.4  C) (Oral)  Ht 5' 4.75\" (1.645 m)  Wt 133 lb (60.3 kg)  SpO2 100%  BMI 22.3 kg/m2  Body mass index is 22.3 kg/(m^2).  GENERAL: healthy, alert and no distress  EYES: Eyes grossly normal to inspection, PERRL and conjunctivae and sclerae normal. No nystagmus  HENT: normal cephalic/atraumatic and left ear: clear effusion and no evidence of TM rupture    Diagnostic Test Results:  Results for orders placed or performed in visit on 17 (from the past 24 hour(s))   Lipid Profile with reflex to direct LDL   Result Value Ref Range    Cholesterol 118 <200 mg/dL    Triglycerides 330 (H) <150 mg/dL    HDL Cholesterol 32 (L) >49 mg/dL    LDL Cholesterol Calculated 20 <100 mg/dL    Non HDL Cholesterol 86 <130 mg/dL   Hemoglobin A1c   Result Value Ref Range    Hemoglobin A1C 7.5 (H) 4.3 - 6.0 %   Basic metabolic panel  (Ca, Cl, CO2, Creat, Gluc, K, Na, " BUN)   Result Value Ref Range    Sodium 139 133 - 144 mmol/L    Potassium 4.3 3.4 - 5.3 mmol/L    Chloride 108 94 - 109 mmol/L    Carbon Dioxide 25 20 - 32 mmol/L    Anion Gap 6 3 - 14 mmol/L    Glucose 136 (H) 70 - 99 mg/dL    Urea Nitrogen 15 7 - 30 mg/dL    Creatinine 0.76 0.52 - 1.04 mg/dL    GFR Estimate 79 >60 mL/min/1.7m2    GFR Estimate If Black >90   GFR Calc   >60 mL/min/1.7m2    Calcium 9.5 8.5 - 10.1 mg/dL   Albumin Random Urine Quantitative   Result Value Ref Range    Creatinine Urine 85 mg/dL    Albumin Urine mg/L 12 mg/L    Albumin Urine mg/g Cr 14.05 0 - 25 mg/g Cr       ASSESSMENT/PLAN:     1. Type 2 diabetes mellitus without complication, without long-term current use of insulin (H)  HgbA1c uncontrolled. Discussed w/ patient, will increase metformin to 1000mg BID as patient thinks she can remember to take two tabs in AM. Will recheck labs in 3 months, if HgbA1c still >7, will plan to increase Invokana to 300mg daily.   - Lipid Profile with reflex to direct LDL  - Hemoglobin A1c  - Basic metabolic panel  (Ca, Cl, CO2, Creat, Gluc, K, Na, BUN)  - Albumin Random Urine Quantitative  - blood glucose monitoring (NO BRAND SPECIFIED) test strip; Use to test blood sugar 3  times daily or as directed.   Use to test 1 strip by in vitro route three times daily  Dispense: 2 Box; Refill: 11  - metFORMIN (GLUCOPHAGE) 500 MG tablet; Take 2 tablets (1,000 mg) by mouth 2 times daily (with meals)  Dispense: 360 tablet; Refill: 1  - canagliflozin (INVOKANA) 100 MG tablet; Take 1 tablet (100 mg) by mouth every morning (before breakfast)  Dispense: 90 tablet; Refill: 3  - ACE/ARB NOT PRESCRIBED, INTENTIONAL,; Please choose reason not prescribed, below    2. Mixed hyperlipidemia  Due for labs, medications refilled.   - atorvastatin (LIPITOR) 20 MG tablet; Take 1 tablet (20 mg) by mouth daily  Dispense: 90 tablet; Refill: 3    3. Moderate episode of recurrent major depressive disorder (H)  Stable,  medications refilled.   - venlafaxine (EFFEXOR) 37.5 MG tablet; Take 1 tablet (37.5 mg) by mouth 2 times daily  Dispense: 180 tablet; Refill: 3    4. Primary insomnia  Stable. Medications refilled.   - zolpidem (AMBIEN) 5 MG tablet; Take 1 tablet (5 mg) by mouth nightly as needed for sleep  Dispense: 30 tablet; Refill: 5    5. Gastroesophageal reflux disease without esophagitis  - omeprazole (PRILOSEC) 20 MG CR capsule; Take 1 capsule (20 mg) by mouth daily  Dispense: 90 capsule; Refill: 3    6. Migraine with aura and without status migrainosus, not intractable  Stable.   - SUMAtriptan (IMITREX) 100 MG tablet; Take 1 tablet (100 mg) by mouth at onset of headache for migraine  Dispense: 18 tablet; Refill: 3    7. Vertigo  No evidence of TM rupture; will hold otic antibiotics. No nystagmus on exam, but given symptoms or tinnitus and vertigo, some concern for Meniere's or labyrinthitis. Fine to continue intranasal steroids and meclizine, if symptoms persist reasonable to follow-up with ENT as scheduled.       Patient Instructions   Diabetes:  -- increase metformin to 2 tabs twice daily  -- come back in 3 months to recheck labs, if still uncontrolled, then we will increase Invokana dose    Ear issues:  I don't see any ear drum rupture. HOWEVER, with the ringing and hearing issues in addition to vertigo symptoms, we worry about other things like Meniere's disease or labrynthitis  -- OK to stop ear drops  -- follow-up with ENT if symptoms persist  -- Use meclizine as needed  -- Increase Flonase to 2 sprays each nostril twice daily.     Medications refilled!      Opal Dietz MD  [facility]

## 2017-06-26 NOTE — PATIENT INSTRUCTIONS
Diabetes:  -- increase metformin to 2 tabs twice daily  -- come back in 3 months to recheck labs, if still uncontrolled, then we will increase Invokana dose    Ear issues:  I don't see any ear drum rupture. HOWEVER, with the ringing and hearing issues in addition to vertigo symptoms, we worry about other things like Meniere's disease or labrynthitis  -- OK to stop ear drops  -- follow-up with ENT if symptoms persist  -- Use meclizine as needed  -- Increase Flonase to 2 sprays each nostril twice daily.     Medications refilled!

## 2017-06-26 NOTE — NURSING NOTE
"Chief Complaint   Patient presents with     Diabetes       Initial /66 (BP Location: Right arm, Patient Position: Chair, Cuff Size: Adult Regular)  Pulse 104  Temp 97.6  F (36.4  C) (Oral)  Ht 5' 4.75\" (1.645 m)  Wt 133 lb (60.3 kg)  SpO2 100%  BMI 22.3 kg/m2 Estimated body mass index is 22.3 kg/(m^2) as calculated from the following:    Height as of this encounter: 5' 4.75\" (1.645 m).    Weight as of this encounter: 133 lb (60.3 kg).  Medication Reconciliation: complete     Janina Aldana MA 6/26/2017 9:45 AM    "

## 2017-06-26 NOTE — MR AVS SNAPSHOT
After Visit Summary   6/26/2017    Bharati Villarreal    MRN: [id number]           Patient Information     Date Of Birth          1960        Visit Information        Provider Department      6/26/2017 9:30 AM Opal Freedman MD [facility]        Today's Diagnoses     Type 2 diabetes mellitus without complication, without long-term current use of insulin (H)    -  1    Mixed hyperlipidemia        Moderate episode of recurrent major depressive disorder (H)        Primary insomnia        Gastroesophageal reflux disease without esophagitis        Migraine with aura and without status migrainosus, not intractable        Vertigo          Care Instructions    Diabetes:  -- increase metformin to 2 tabs twice daily  -- come back in 3 months to recheck labs, if still uncontrolled, then we will increase Invokana dose    Ear issues:  I don't see any ear drum rupture. HOWEVER, with the ringing and hearing issues in addition to vertigo symptoms, we worry about other things like Meniere's disease or labrynthitis  -- OK to stop ear drops  -- follow-up with ENT if symptoms persist  -- Use meclizine as needed  -- Increase Flonase to 2 sprays each nostril twice daily.     Medications refilled!          Follow-ups after your visit        Who to contact     If you have questions or need follow up information about today's clinic visit or your schedule please contact [facility] directly at [phone].  Normal or non-critical lab and imaging results will be communicated to you by MyChart, letter or phone within 4 business days after the clinic has received the results. If you do not hear from us within 7 days, please contact the clinic through MyChart or phone. If you have a critical or abnormal lab result, we will notify you by phone as soon as possible.  Submit refill requests through [employer] or call your pharmacy and they will forward the refill request to us. Please allow 3 business days for  "your refill to be completed.          Additional Information About Your Visit        [employer] Information     [employer] gives you secure access to your electronic health record. If you see a primary care provider, you can also send messages to your care team and make appointments. If you have questions, please call your primary care clinic.  If you do not have a primary care provider, please call [phone] and they will assist you.        Care EveryWhere ID     This is your Care EveryWhere ID. This could be used by other organizations to access your [facility] medical records  [id number]        Your Vitals Were     Pulse Temperature Height Pulse Oximetry BMI (Body Mass Index)       104 97.6  F (36.4  C) (Oral) 5' 4.75\" (1.645 m) 100% 22.3 kg/m2        Blood Pressure from Last 3 Encounters:   06/26/17 100/66   06/23/17 110/66   05/08/17 112/62    Weight from Last 3 Encounters:   06/26/17 133 lb (60.3 kg)   06/23/17 133 lb 3.2 oz (60.4 kg)   05/08/17 132 lb 8 oz (60.1 kg)              We Performed the Following     Albumin Random Urine Quantitative     Basic metabolic panel  (Ca, Cl, CO2, Creat, Gluc, K, Na, BUN)     Hemoglobin A1c     Lipid Profile with reflex to direct LDL          Today's Medication Changes          These changes are accurate as of: 6/26/17 10:34 AM.  If you have any questions, ask your nurse or doctor.               Start taking these medicines.        Dose/Directions    venlafaxine 37.5 MG tablet   Commonly known as:  EFFEXOR   Used for:  Moderate episode of recurrent major depressive disorder (H)   Replaces:  venlafaxine 75 MG 24 hr capsule   Started by:  Opal Freedman MD        Dose:  37.5 mg   Take 1 tablet (37.5 mg) by mouth 2 times daily   Quantity:  180 tablet   Refills:  3         These medicines have changed or have updated prescriptions.        Dose/Directions    metFORMIN 500 MG tablet   Commonly known as:  GLUCOPHAGE   This may have changed:  See the new instructions.   Used for:  " Type 2 diabetes mellitus without complication, without long-term current use of insulin (H)   Changed by:  Opal Freedman MD        Dose:  1000 mg   Take 2 tablets (1,000 mg) by mouth 2 times daily (with meals)   Quantity:  360 tablet   Refills:  1         Stop taking these medicines if you haven't already. Please contact your care team if you have questions.     prazosin 1 MG capsule   Commonly known as:  MINIPRESS   Stopped by:  Opal Freedman MD           venlafaxine 75 MG 24 hr capsule   Commonly known as:  EFFEXOR-XR   Replaced by:  venlafaxine 37.5 MG tablet   Stopped by:  Opal Freedman MD                Where to get your medicines      These medications were sent to [facility] [address] [place] [address] [address] AT [facility] [address]  [address] [facility] [address]    Hours:  24-hours Phone:  [phone]     atorvastatin 20 MG tablet    blood glucose monitoring test strip    canagliflozin 100 MG tablet    metFORMIN 500 MG tablet    omeprazole 20 MG CR capsule    SUMAtriptan 100 MG tablet    venlafaxine 37.5 MG tablet         Some of these will need a paper prescription and others can be bought over the counter.  Ask your nurse if you have questions.     Bring a paper prescription for each of these medications     zolpidem 5 MG tablet                Primary Care Provider Office Phone # Fax #    Opal Freedman [phone]       [facility] [address] [place] [address]        Equal Access to Services     ROCCO MAYEN AH: Hadii geremias ku hadasho Soomaali, waaxda luqadaha, qaybta kaalmada adeegyada, waxay oly ferrara. So [facility] [phone].    ATENCIÓN: Si habla español, tiene a galvan disposición servicios gratuitos de asistencia lingüística. Llame al [phone].    We comply with applicable federal civil rights laws and [place] laws. We do not discriminate on the basis of race, color, national origin, age,  disability sex, sexual orientation or gender identity.            Thank you!     Thank you for choosing [facility]  for your care. Our goal is always to provide you with excellent care. Hearing back from our patients is one way we can continue to improve our services. Please take a few minutes to complete the written survey that you may receive in the mail after your visit with us. Thank you!             Your Updated Medication List - Protect others around you: Learn how to safely use, store and throw away your medicines at www.disposemymeds.org.          This list is accurate as of: 6/26/17 10:34 AM.  Always use your most recent med list.                   Brand Name Dispense Instructions for use Diagnosis    ACE NOT PRESCRIBED (INTENTIONAL)     0 each    ACE Inhibitor not prescribed due to Refusal by patient NO PROTEINURIA    Needs smoking cessation education       acyclovir 5 % ointment    ZOVIRAX    15 g    Apply topically 6 times daily    Cold sore       atorvastatin 20 MG tablet    LIPITOR    90 tablet    Take 1 tablet (20 mg) by mouth daily    Mixed hyperlipidemia       blood glucose monitoring meter device kit     1 kit    Use to test blood sugars 1 times daily or as directed.    Type 2 diabetes mellitus without complication (H)       * blood glucose monitoring test strip    no brand specified    100 strip    1 strip by In Vitro route 3 times daily    Diabetes mellitus, type 2 (H)       * blood glucose monitoring test strip    no brand specified    2 Box    Use to test blood sugar 3  times daily or as directed.   Use to test 1 strip by in vitro route three times daily    Type 2 diabetes mellitus without complication, without long-term current use of insulin (H)       canagliflozin 100 MG tablet    INVOKANA    90 tablet    Take 1 tablet (100 mg) by mouth every morning (before breakfast)    Type 2 diabetes mellitus without complication, without long-term current use of insulin (H)       fluticasone  50 MCG/ACT spray    FLONASE    1 Bottle    Spray 1-2 sprays into both nostrils daily    Eardrum rupture, left       IBUPROFEN      PM for sleep        LORazepam 0.5 MG tablet    ATIVAN    8 tablet    Take 1 tablet (0.5 mg) by mouth daily    Anxiety       meclizine 25 MG tablet    ANTIVERT    30 tablet    Take 1 tablet (25 mg) by mouth every 6 hours as needed for dizziness    BPPV (benign paroxysmal positional vertigo), unspecified laterality       metFORMIN 500 MG tablet    GLUCOPHAGE    360 tablet    Take 2 tablets (1,000 mg) by mouth 2 times daily (with meals)    Type 2 diabetes mellitus without complication, without long-term current use of insulin (H)       naproxen 500 MG tablet    NAPROSYN    60 tablet    Take 1 tablet (500 mg) by mouth at onset of headache for moderate pain or headaches    Migraine without status migrainosus, not intractable, unspecified migraine type       * nicotine 14 MG/24HR 24 hr patch    NICODERM CQ    30 patch    Place 1 patch onto the skin every 24 hours    Tobacco use disorder       * nicotine 7 MG/24HR 24 hr patch    NICODERM CQ    30 patch    Place 1 patch onto the skin every 24 hours    Tobacco use disorder       omeprazole 20 MG CR capsule    priLOSEC    90 capsule    Take 1 capsule (20 mg) by mouth daily    Gastroesophageal reflux disease without esophagitis       SUMAtriptan 100 MG tablet    IMITREX    18 tablet    Take 1 tablet (100 mg) by mouth at onset of headache for migraine    Migraine with aura and without status migrainosus, not intractable       venlafaxine 37.5 MG tablet    EFFEXOR    180 tablet    Take 1 tablet (37.5 mg) by mouth 2 times daily    Moderate episode of recurrent major depressive disorder (H)       zolpidem 5 MG tablet    AMBIEN    30 tablet    Take 1 tablet (5 mg) by mouth nightly as needed for sleep    Primary insomnia       * Notice:  This list has 4 medication(s) that are the same as other medications prescribed for you. Read the directions  carefully, and ask your doctor or other care provider to review them with you.

## 2017-06-27 ASSESSMENT — ANXIETY QUESTIONNAIRES: GAD7 TOTAL SCORE: 12

## 2017-06-27 ASSESSMENT — PATIENT HEALTH QUESTIONNAIRE - PHQ9: SUM OF ALL RESPONSES TO PHQ QUESTIONS 1-9: 18

## 2017-09-26 DIAGNOSIS — E11.9 TYPE 2 DIABETES MELLITUS WITHOUT COMPLICATION, WITHOUT LONG-TERM CURRENT USE OF INSULIN (H): Primary | ICD-10-CM

## 2017-09-27 ENCOUNTER — OFFICE VISIT (OUTPATIENT)
Dept: PEDIATRICS | Facility: CLINIC | Age: 57
End: 2017-09-27
Payer: COMMERCIAL

## 2017-09-27 VITALS
HEIGHT: 65 IN | HEART RATE: 96 BPM | DIASTOLIC BLOOD PRESSURE: 70 MMHG | SYSTOLIC BLOOD PRESSURE: 114 MMHG | TEMPERATURE: 97.7 F | OXYGEN SATURATION: 99 % | BODY MASS INDEX: 21.58 KG/M2 | WEIGHT: 129.5 LBS

## 2017-09-27 DIAGNOSIS — E11.9 TYPE 2 DIABETES MELLITUS WITHOUT COMPLICATION, WITHOUT LONG-TERM CURRENT USE OF INSULIN (H): Primary | ICD-10-CM

## 2017-09-27 DIAGNOSIS — E11.9 TYPE 2 DIABETES MELLITUS WITHOUT COMPLICATION, WITHOUT LONG-TERM CURRENT USE OF INSULIN (H): ICD-10-CM

## 2017-09-27 DIAGNOSIS — F33.1 MODERATE EPISODE OF RECURRENT MAJOR DEPRESSIVE DISORDER (H): ICD-10-CM

## 2017-09-27 DIAGNOSIS — Z23 NEED FOR VACCINATION: ICD-10-CM

## 2017-09-27 DIAGNOSIS — Z23 NEED FOR PROPHYLACTIC VACCINATION AND INOCULATION AGAINST INFLUENZA: ICD-10-CM

## 2017-09-27 LAB — HBA1C MFR BLD: 7.1 % (ref 4.3–6)

## 2017-09-27 PROCEDURE — 90472 IMMUNIZATION ADMIN EACH ADD: CPT | Performed by: INTERNAL MEDICINE

## 2017-09-27 PROCEDURE — 90736 HZV VACCINE LIVE SUBQ: CPT | Performed by: INTERNAL MEDICINE

## 2017-09-27 PROCEDURE — 99214 OFFICE O/P EST MOD 30 MIN: CPT | Mod: 25 | Performed by: INTERNAL MEDICINE

## 2017-09-27 PROCEDURE — 36415 COLL VENOUS BLD VENIPUNCTURE: CPT | Performed by: INTERNAL MEDICINE

## 2017-09-27 PROCEDURE — 90686 IIV4 VACC NO PRSV 0.5 ML IM: CPT | Performed by: INTERNAL MEDICINE

## 2017-09-27 PROCEDURE — 90471 IMMUNIZATION ADMIN: CPT | Performed by: INTERNAL MEDICINE

## 2017-09-27 PROCEDURE — 83036 HEMOGLOBIN GLYCOSYLATED A1C: CPT | Performed by: INTERNAL MEDICINE

## 2017-09-27 RX ORDER — VENLAFAXINE HYDROCHLORIDE 150 MG/1
150 CAPSULE, EXTENDED RELEASE ORAL DAILY
Qty: 30 CAPSULE | Refills: 1 | COMMUNITY
Start: 2017-09-27 | End: 2017-12-28

## 2017-09-27 ASSESSMENT — ANXIETY QUESTIONNAIRES
GAD7 TOTAL SCORE: 16
5. BEING SO RESTLESS THAT IT IS HARD TO SIT STILL: NEARLY EVERY DAY
6. BECOMING EASILY ANNOYED OR IRRITABLE: NEARLY EVERY DAY
2. NOT BEING ABLE TO STOP OR CONTROL WORRYING: MORE THAN HALF THE DAYS
7. FEELING AFRAID AS IF SOMETHING AWFUL MIGHT HAPPEN: MORE THAN HALF THE DAYS
3. WORRYING TOO MUCH ABOUT DIFFERENT THINGS: MORE THAN HALF THE DAYS
IF YOU CHECKED OFF ANY PROBLEMS ON THIS QUESTIONNAIRE, HOW DIFFICULT HAVE THESE PROBLEMS MADE IT FOR YOU TO DO YOUR WORK, TAKE CARE OF THINGS AT HOME, OR GET ALONG WITH OTHER PEOPLE: EXTREMELY DIFFICULT
1. FEELING NERVOUS, ANXIOUS, OR ON EDGE: MORE THAN HALF THE DAYS

## 2017-09-27 ASSESSMENT — PATIENT HEALTH QUESTIONNAIRE - PHQ9
5. POOR APPETITE OR OVEREATING: MORE THAN HALF THE DAYS
SUM OF ALL RESPONSES TO PHQ QUESTIONS 1-9: 26

## 2017-09-27 NOTE — LETTER
My Depression Action Plan  Name: Bharati Villarreal   Date of Birth 1960  Date: 9/27/2017    My doctor: Opal Freedman   My clinic: [address]  [address]  [facility] [address] [phone]          GREEN    ZONE   Good Control    What it looks like:     Things are going generally well. You have normal up s and down s. You may even feel depressed from time to time, but bad moods usually last less than a day.   What you need to do:  1. Continue to care for yourself (see self care plan)  2. Check your depression survival kit and update it as needed  3. Follow your physician s recommendations including any medication.  4. Do not stop taking medication unless you consult with your physician first.           YELLOW         ZONE Getting Worse    What it looks like:     Depression is starting to interfere with your life.     It may be hard to get out of bed; you may be starting to isolate yourself from others.    Symptoms of depression are starting to last most all day and this has happened for several days.     You may have suicidal thoughts but they are not constant.   What you need to do:     1. Call your care team, your response to treatment will improve if you keep your care team informed of your progress. Yellow periods are signs an adjustment may need to be made.     2. Continue your self-care, even if you have to fake it!    3. Talk to someone in your support network    4. Open up your depression survival kit           RED    ZONE Medical Alert - Get Help    What it looks like:     Depression is seriously interfering with your life.     You may experience these or other symptoms: You can t get out of bed most days, can t work or engage in other necessary activities, you have trouble taking care of basic hygiene, or basic responsibilities, thoughts of suicide or death that will not go away, self-injurious behavior.     What you need to do:  1. Call your care  team and request a same-day appointment. If they are not available (weekends or after hours) call your local crisis line, emergency room or 911.      Electronically signed by: An Aldana, September 27, 2017    Depression Self Care Plan / Survival Kit    Self-Care for Depression  Here s the deal. Your body and mind are really not as separate as most people think.  What you do and think affects how you feel and how you feel influences what you do and think. This means if you do things that people who feel good do, it will help you feel better.  Sometimes this is all it takes.  There is also a place for medication and therapy depending on how severe your depression is, so be sure to consult with your medical provider and/ or Behavioral Health Consultant if your symptoms are worsening or not improving.     In order to better manage my stress, I will:    Exercise  Get some form of exercise, every day. This will help reduce pain and release endorphins, the  feel good  chemicals in your brain. This is almost as good as taking antidepressants!  This is not the same as joining a gym and then never going! (they count on that by the way ) It can be as simple as just going for a walk or doing some gardening, anything that will get you moving.      Hygiene   Maintain good hygiene (Get out of bed in the morning, Make your bed, Brush your teeth, Take a shower, and Get dressed like you were going to work, even if you are unemployed).  If your clothes don't fit try to get ones that do.    Diet  I will strive to eat foods that are good for me, drink plenty of water, and avoid excessive sugar, caffeine, alcohol, and other mood-altering substances.  Some foods that are helpful in depression are: complex carbohydrates, B vitamins, flaxseed, fish or fish oil, fresh fruits and vegetables.    Psychotherapy  I agree to participate in Individual Therapy (if recommended).    Medication  If prescribed medications, I agree to take them.   Missing doses can result in serious side effects.  I understand that drinking alcohol, or other illicit drug use, may cause potential side effects.  I will not stop my medication abruptly without first discussing it with my provider.    Staying Connected With Others  I will stay in touch with my friends, family members, and my primary care provider/team.    Use your imagination  Be creative.  We all have a creative side; it doesn t matter if it s oil painting, sand castles, or mud pies! This will also kick up the endorphins.    Witness Beauty  (AKA stop and smell the roses) Take a look outside, even in mid-winter. Notice colors, textures. Watch the squirrels and birds.     Service to others  Be of service to others.  There is always someone else in need.  By helping others we can  get out of ourselves  and remember the really important things.  This also provides opportunities for practicing all the other parts of the program.    Humor  Laugh and be silly!  Adjust your TV habits for less news and crime-drama and more comedy.    Control your stress  Try breathing deep, massage therapy, biofeedback, and meditation. Find time to relax each day.     My support system    Clinic Contact:  Phone number:    Contact 1:  Phone number:    Contact 2:  Phone number:    Sabianist/:  Phone number:    Therapist:  Phone number:    Local crisis center:    Phone number:    Other community support:  Phone number:

## 2017-09-27 NOTE — PROGRESS NOTES
"  SUBJECTIVE:   Bharati Villarreal is a 57 year old female who presents to clinic today for the following health issues:      Diabetes Follow-up      Patient is checking blood sugars: 1 times a week    Diabetic concerns: None     Symptoms of hypoglycemia (low blood sugar): none     Paresthesias (numbness or burning in feet) or sores: Yes-  tingling      Date of last diabetic eye exam: January 2017    Hyperlipidemia Follow-Up      Rate your low fat/cholesterol diet?: not monitoring fat    Taking statin?  Yes, no muscle aches from statin    Other lipid medications/supplements?:  none      Amount of exercise or physical activity: None lately     Problems taking medications regularly: No    Medication side effects: none  Diet: regular (no restrictions)    Bharati has the following issues today:    1. Depression: Reports that in August, this got markedly worse, she \"hit a wall\". She has been dealing with a lot over the past summer with her 's health issues. Then in July her 28 year old niece committed suicide, which has been particularly challenging for her. At some point, she was looking at a handful of pills and wondering about taking them and realized she needed to get help. Has now been seeing a psychiatrist and psychologist at [facility], increased venlafaxine to 150mg XR daily. Was doing better last week but since Monday this week has been quite depressed and bummed out. Has been working half days at work, which seems to help. At this time no SI. Has an appointment with her psychiatrist later today.     2. Diabetes: Has been taking medication, including metformin 1g BID, religiously. Has also been taking Invokana. Tolerating medications well without side effects. Has been eating pretty healthy, but she does drink Coke pretty regularly. With her worsening mood it has been harder to focus in her diet. Hasn't been able to stop smoking.       Problem list and histories reviewed & adjusted, as indicated.  Additional history: as " "documented    Patient Active Problem List   Diagnosis     Insomnia     GERD (gastroesophageal reflux disease)     Hyperlipidemia with target LDL less than 100     Anxiety     Psoriasis     Tobacco abuse     Moderate episode of recurrent major depressive disorder (H)     Migraine with aura and without status migrainosus, not intractable     Type 2 diabetes mellitus without complication, without long-term current use of insulin (H)     Past Surgical History:   Procedure Laterality Date     ARTHROSCOPY SHOULDER RT/LT      forzen should repair RIGHT     AS ABLATION, ENDOMETRIAL, THERMAL, W/O HYSTEROSCOPIC GUIDANCE       HYSTERECTOMY VAGINAL, BILATERAL SALPINGO-OOPHERECTOMY, COMBINED         Social History   Substance Use Topics     Smoking status: Current Every Day Smoker     Packs/day: 0.00     Years: 35.00     Types: Cigarettes     Smokeless tobacco: Never Used      Comment:  PPD - started at age 14     Alcohol use No     Family History   Problem Relation Age of Onset     DIABETES Other      Cancer - colorectal Father 72      of throat cancer  smoker      CANCER Father      Other Cancer Father      Family History Negative Mother      CANCER Maternal Grandmother      Colon Cancer Paternal Grandmother              Reviewed and updated as needed this visit by clinical staffTobacco  Allergies  Meds  Med Hx  Fam Hx  Soc Hx        ROS:  Constitutional, psych, cardiovascular, pulmonary, gi and endo systems are negative, except as otherwise noted.      OBJECTIVE:   /70 (BP Location: Right arm, Patient Position: Chair, Cuff Size: Adult Regular)  Pulse 96  Temp 97.7  F (36.5  C) (Oral)  Ht 5' 4.75\" (1.645 m)  Wt 129 lb 8 oz (58.7 kg)  SpO2 99%  Breastfeeding? No  BMI 21.72 kg/m2  Body mass index is 21.72 kg/(m^2).  GENERAL: healthy, alert and no distress  PSYCH: mentation appears normal and affect slightly depressed and occasionally tearful during interview.     Diagnostic Test Results:  Results for " orders placed or performed in visit on 09/27/17 (from the past 24 hour(s))   Hemoglobin A1c   Result Value Ref Range    Hemoglobin A1C 7.1 (H) 4.3 - 6.0 %       ASSESSMENT/PLAN:       1. Type 2 diabetes mellitus without complication, without long-term current use of insulin (H)  Improving control with increased dose of metformin; anticipate that she would have HgbA1c under 7 if she was able to cut out soda from diet. Discussed dietary modifications vs increasing Invokana dose; at this time, patient interested in going up on Invokana. Reviewed side effects. Once mood is under better control will readdress dietary changes, tobacco cessation. Continue metformin at current dose.  - canagliflozin (INVOKANA) 300 MG tablet; Take 1 tablet (300 mg) by mouth every morning (before breakfast)  Dispense: 90 tablet; Refill: 1    2. Moderate episode of recurrent major depressive disorder (H)  Recent worsening with multiple life stressors. Patient well established with therapy and psychiatry; meeting her psychiatrist later today to review medication adjustments. Discussed that I'm happy to fill out FMLA if needed; encouraged her to work on getting out of the house regularly for work or for other activities. Contracts for safety.   - venlafaxine (EFFEXOR-XR) 150 MG 24 hr capsule; Take 1 capsule (150 mg) by mouth daily  Dispense: 30 capsule; Refill: 1    3. Need for prophylactic vaccination and inoculation against influenza  - FLU VAC, SPLIT VIRUS IM > 3 YO (QUADRIVALENT) [85479]  - Vaccine Administration, Initial [88927]    Patient Instructions   Continue metformin 1000mg twice daily.    We will increase the Invokana to 300mg daily. Come back in 3 months for labs.    Hang in there with everything. Continue to follow-up with psychiatry and psychology, and let me know if there are things I can do to help (FMLA paperwork, etc).    Work on getting out of the house; either for work or fun.       Opal Dietz MD  [facility]  [facility]  Injectable Influenza Immunization Documentation    1.  Is the person to be vaccinated sick today?   No    2. Does the person to be vaccinated have an allergy to a component   of the vaccine?   No    3. Has the person to be vaccinated ever had a serious reaction   to influenza vaccine in the past?   No    4. Has the person to be vaccinated ever had Guillain-Barré syndrome?   No    Form completed by pt/ Janina Aldana MA 9/27/2017 7:36 AM

## 2017-09-27 NOTE — NURSING NOTE
"Chief Complaint   Patient presents with     Diabetes     Hyperlipidemia     Flu Shot       Initial /70 (BP Location: Right arm, Patient Position: Chair, Cuff Size: Adult Regular)  Pulse 96  Temp 97.7  F (36.5  C) (Oral)  Ht 5' 4.75\" (1.645 m)  Wt 129 lb 8 oz (58.7 kg)  SpO2 99%  Breastfeeding? No  BMI 21.72 kg/m2 Estimated body mass index is 21.72 kg/(m^2) as calculated from the following:    Height as of this encounter: 5' 4.75\" (1.645 m).    Weight as of this encounter: 129 lb 8 oz (58.7 kg).  Medication Reconciliation: complete     Janina Aldana MA 9/27/2017 7:39 AM    "

## 2017-09-27 NOTE — MR AVS SNAPSHOT
After Visit Summary   9/27/2017    Bharati Villarreal    MRN: [id number]           Patient Information     Date Of Birth          1960        Visit Information        Provider Department      9/27/2017 7:50 AM Opal Freedman MD [facility]        Today's Diagnoses     Type 2 diabetes mellitus without complication, without long-term current use of insulin (H)    -  1    Need for prophylactic vaccination and inoculation against influenza          Care Instructions    Continue metformin 1000mg twice daily.    We will increase the Invokana to 300mg daily. Come back in 3 months for labs.    Hang in there with everything. Continue to follow-up with psychiatry and psychology, and let me know if there are things I can do to help (FMLA paperwork, etc).    Work on getting out of the house; either for work or fun.           Follow-ups after your visit        Who to contact     If you have questions or need follow up information about today's clinic visit or your schedule please contact [facility] directly at [phone].  Normal or non-critical lab and imaging results will be communicated to you by Qbixt, letter or phone within 4 business days after the clinic has received the results. If you do not hear from us within 7 days, please contact the clinic through DuckDuckGo or phone. If you have a critical or abnormal lab result, we will notify you by phone as soon as possible.  Submit refill requests through DuckDuckGo or call your pharmacy and they will forward the refill request to us. Please allow 3 business days for your refill to be completed.          Additional Information About Your Visit        Talismahart Information     DuckDuckGo gives you secure access to your electronic health record. If you see a primary care provider, you can also send messages to your care team and make appointments. If you have questions, please call your primary care clinic.  If you do not have a primary care  "provider, please call [phone] and they will assist you.        Care EveryWhere ID     This is your Care EveryWhere ID. This could be used by other organizations to access your [facility] medical records  [id number]        Your Vitals Were     Pulse Temperature Height Pulse Oximetry Breastfeeding? BMI (Body Mass Index)    96 97.7  F (36.5  C) (Oral) 5' 4.75\" (1.645 m) 99% No 21.72 kg/m2       Blood Pressure from Last 3 Encounters:   09/27/17 114/70   06/26/17 100/66   06/23/17 110/66    Weight from Last 3 Encounters:   09/27/17 129 lb 8 oz (58.7 kg)   06/26/17 133 lb (60.3 kg)   06/23/17 133 lb 3.2 oz (60.4 kg)              We Performed the Following     DEPRESSION ACTION PLAN (DAP)     FLU VAC, SPLIT VIRUS IM > 3 YO (QUADRIVALENT) [30181]     Vaccine Administration, Initial [45806]          Today's Medication Changes          These changes are accurate as of: 9/27/17  8:30 AM.  If you have any questions, ask your nurse or doctor.               These medicines have changed or have updated prescriptions.        Dose/Directions    canagliflozin 300 MG tablet   Commonly known as:  INVOKANA   This may have changed:    - medication strength  - how much to take   Used for:  Type 2 diabetes mellitus without complication, without long-term current use of insulin (H)   Changed by:  Opal Freedman MD        Dose:  300 mg   Take 1 tablet (300 mg) by mouth every morning (before breakfast)   Quantity:  90 tablet   Refills:  1            Where to get your medicines      These medications were sent to [id number] - [place], [place] - [address] AT [facility] [address], [facility] [address]    Hours:  24-hours Phone:  [phone]     canagliflozin 300 MG tablet                Primary Care Provider Office Phone # Fax #    Opal Freedman [phone] [address] [place] [address]        Equal Access to Services     CAROLYN MAYEN AH: Vanessa neves " soledad Olivarez, warebekahda luqadaha, qaybta kaalmada librado, ruiz ng ellyharriet dashshawn laLaurajethro josias. So [facility] [phone].    ATENCIÓN: Si habla español, tiene a galvan disposición servicios gratuitos de asistencia lingüística. Lora al [phone].    We comply with applicable federal civil rights laws and [place] laws. We do not discriminate on the basis of race, color, national origin, age, disability sex, sexual orientation or gender identity.            Thank you!     Thank you for choosing [facility]  for your care. Our goal is always to provide you with excellent care. Hearing back from our patients is one way we can continue to improve our services. Please take a few minutes to complete the written survey that you may receive in the mail after your visit with us. Thank you!             Your Updated Medication List - Protect others around you: Learn how to safely use, store and throw away your medicines at www.disposemymeds.org.          This list is accurate as of: 9/27/17  8:30 AM.  Always use your most recent med list.                   Brand Name Dispense Instructions for use Diagnosis    ACE NOT PRESCRIBED (INTENTIONAL)     0 each    ACE Inhibitor not prescribed due to Refusal by patient NO PROTEINURIA    Needs smoking cessation education       ACE/ARB NOT PRESCRIBED (INTENTIONAL)      Please choose reason not prescribed, below    Type 2 diabetes mellitus without complication, without long-term current use of insulin (H)       acyclovir 5 % ointment    ZOVIRAX    15 g    Apply topically 6 times daily    Cold sore       atorvastatin 20 MG tablet    LIPITOR    90 tablet    Take 1 tablet (20 mg) by mouth daily    Mixed hyperlipidemia       blood glucose monitoring meter device kit     1 kit    Use to test blood sugars 1 times daily or as directed.    Type 2 diabetes mellitus without complication (H)       * blood glucose monitoring test strip    no brand specified    100 strip    1 strip by  In Vitro route 3 times daily    Diabetes mellitus, type 2 (H)       * blood glucose monitoring test strip    no brand specified    2 Box    Use to test blood sugar 3  times daily or as directed.   Use to test 1 strip by in vitro route three times daily    Type 2 diabetes mellitus without complication, without long-term current use of insulin (H)       canagliflozin 300 MG tablet    INVOKANA    90 tablet    Take 1 tablet (300 mg) by mouth every morning (before breakfast)    Type 2 diabetes mellitus without complication, without long-term current use of insulin (H)       fluticasone 50 MCG/ACT spray    FLONASE    1 Bottle    Spray 1-2 sprays into both nostrils daily    Eardrum rupture, left       IBUPROFEN      PM for sleep        LORazepam 0.5 MG tablet    ATIVAN    8 tablet    Take 1 tablet (0.5 mg) by mouth daily    Anxiety       meclizine 25 MG tablet    ANTIVERT    30 tablet    Take 1 tablet (25 mg) by mouth every 6 hours as needed for dizziness    BPPV (benign paroxysmal positional vertigo), unspecified laterality       metFORMIN 500 MG tablet    GLUCOPHAGE    360 tablet    Take 2 tablets (1,000 mg) by mouth 2 times daily (with meals)    Type 2 diabetes mellitus without complication, without long-term current use of insulin (H)       naproxen 500 MG tablet    NAPROSYN    60 tablet    Take 1 tablet (500 mg) by mouth at onset of headache for moderate pain or headaches    Migraine without status migrainosus, not intractable, unspecified migraine type       * nicotine 14 MG/24HR 24 hr patch    NICODERM CQ    30 patch    Place 1 patch onto the skin every 24 hours    Tobacco use disorder       * nicotine 7 MG/24HR 24 hr patch    NICODERM CQ    30 patch    Place 1 patch onto the skin every 24 hours    Tobacco use disorder       omeprazole 20 MG CR capsule    priLOSEC    90 capsule    Take 1 capsule (20 mg) by mouth daily    Gastroesophageal reflux disease without esophagitis       SUMAtriptan 100 MG tablet    IMITREX     18 tablet    Take 1 tablet (100 mg) by mouth at onset of headache for migraine    Migraine with aura and without status migrainosus, not intractable       venlafaxine 37.5 MG tablet    EFFEXOR    180 tablet    Take 1 tablet (37.5 mg) by mouth 2 times daily    Moderate episode of recurrent major depressive disorder (H)       zolpidem 5 MG tablet    AMBIEN    30 tablet    Take 1 tablet (5 mg) by mouth nightly as needed for sleep    Primary insomnia       * Notice:  This list has 4 medication(s) that are the same as other medications prescribed for you. Read the directions carefully, and ask your doctor or other care provider to review them with you.

## 2017-09-27 NOTE — NURSING NOTE
1.  Has the patient received the information for the Zostavax vaccine? NO    2.  Does the patient have any of the following contraindications?     Allergy to Neomycin or Gelatin?  No     Current moderate or severe illness?  No  Temp = 97.7  If temp > 99.0 degrees orally or patient has above allergies, vaccine is deferred    3.  The vaccine has been administered in the usual fashion and the patient was instructed to wait 20 minutes before leaving the building in the event of an allergic reaction: YES    Vaccination given by Janina Aldana MA 9/27/2017 8:31 AM    Recorded by An Aldana

## 2017-09-27 NOTE — PATIENT INSTRUCTIONS
Continue metformin 1000mg twice daily.    We will increase the Invokana to 300mg daily. Come back in 3 months for labs.    Hang in there with everything. Continue to follow-up with psychiatry and psychology, and let me know if there are things I can do to help (FMLA paperwork, etc).    Work on getting out of the house; either for work or fun.

## 2017-09-28 ASSESSMENT — ANXIETY QUESTIONNAIRES: GAD7 TOTAL SCORE: 16

## 2017-12-28 ENCOUNTER — OFFICE VISIT (OUTPATIENT)
Dept: PEDIATRICS | Facility: CLINIC | Age: 57
End: 2017-12-28
Payer: COMMERCIAL

## 2017-12-28 VITALS
OXYGEN SATURATION: 97 % | DIASTOLIC BLOOD PRESSURE: 78 MMHG | SYSTOLIC BLOOD PRESSURE: 124 MMHG | HEIGHT: 65 IN | TEMPERATURE: 98.8 F | WEIGHT: 132.1 LBS | BODY MASS INDEX: 22.01 KG/M2 | HEART RATE: 109 BPM

## 2017-12-28 DIAGNOSIS — E11.9 TYPE 2 DIABETES MELLITUS WITHOUT COMPLICATION, WITHOUT LONG-TERM CURRENT USE OF INSULIN (H): Primary | ICD-10-CM

## 2017-12-28 DIAGNOSIS — E11.40 TYPE 2 DIABETES, CONTROLLED, WITH NEUROPATHY (H): ICD-10-CM

## 2017-12-28 DIAGNOSIS — G56.22 LESION OF LEFT ULNAR NERVE: ICD-10-CM

## 2017-12-28 DIAGNOSIS — F33.1 MODERATE EPISODE OF RECURRENT MAJOR DEPRESSIVE DISORDER (H): ICD-10-CM

## 2017-12-28 LAB — HBA1C MFR BLD: 7.4 % (ref 4.3–6)

## 2017-12-28 PROCEDURE — 99214 OFFICE O/P EST MOD 30 MIN: CPT | Performed by: INTERNAL MEDICINE

## 2017-12-28 PROCEDURE — 99207 C FOOT EXAM  NO CHARGE: CPT | Performed by: INTERNAL MEDICINE

## 2017-12-28 PROCEDURE — 36415 COLL VENOUS BLD VENIPUNCTURE: CPT | Performed by: FAMILY MEDICINE

## 2017-12-28 PROCEDURE — 83036 HEMOGLOBIN GLYCOSYLATED A1C: CPT | Performed by: FAMILY MEDICINE

## 2017-12-28 RX ORDER — VENLAFAXINE HYDROCHLORIDE 75 MG/1
225 CAPSULE, EXTENDED RELEASE ORAL DAILY
COMMUNITY
Start: 2017-12-28 | End: 2018-09-12

## 2017-12-28 RX ORDER — MIRTAZAPINE 15 MG/1
15 TABLET, FILM COATED ORAL AT BEDTIME
Qty: 30 TABLET | Refills: 1 | COMMUNITY
Start: 2017-12-28 | End: 2018-09-12

## 2017-12-28 NOTE — NURSING NOTE
"Chief Complaint   Patient presents with     Diabetes       Initial /78 (Cuff Size: Adult Regular)  Pulse 109  Temp 98.8  F (37.1  C)  Ht 5' 4.75\" (1.645 m)  Wt 132 lb 1.6 oz (59.9 kg)  SpO2 97%  BMI 22.15 kg/m2 Estimated body mass index is 22.15 kg/(m^2) as calculated from the following:    Height as of this encounter: 5' 4.75\" (1.645 m).    Weight as of this encounter: 132 lb 1.6 oz (59.9 kg).  Medication Reconciliation: complete   Melody Tellez LPN    "

## 2017-12-28 NOTE — PATIENT INSTRUCTIONS
Work on dietary and exercise changes for the next 3 months, but we may still see your Hemoglobin A1c go up with the mirtazapine, which is fine. If this happens, we will put you on a third medication.     Keep up the great work with all of the progress you are making from a mental health standpoint.     Have your work contact me if they need more information.

## 2017-12-28 NOTE — PROGRESS NOTES
SUBJECTIVE:   Bharati Villarreal is a 57 year old female who presents to clinic today for the following health issues:      Diabetes Follow-up    Patient is checking blood sugars: once daily.  Results are as follows:       bedtime - 153        Diabetic concerns: None     Symptoms of hypoglycemia (low blood sugar): none     Paresthesias (numbness or burning in feet) or sores: No     Date of last diabetic eye exam:     Hyperlipidemia Follow-Up      Rate your low fat/cholesterol diet?: not monitoring fat    Taking statin?  Yes, no muscle aches from statin    Other lipid medications/supplements?:  none    Hypertension Follow-up      Outpatient blood pressures are not being checked.    Low Salt Diet: no added salt  BP Readings from Last 2 Encounters:   17 114/70   17 100/66     Hemoglobin A1C (%)   Date Value   2017 7.4 (H)   2017 7.1 (H)     LDL Cholesterol Calculated (mg/dL)   Date Value   2017 20   2016 37         Amount of exercise or physical activity: None    Problems taking medications regularly: No    Medication side effects: none    Diet: low carbs    Bharati comes in for follow-up of the followin. Type 2 DM: Tolerating Invokana and metformin well without issues. Has been cutting back on her soda intake. Did eat a lot of sugar cookies over the [place] holidays, reports that blood sugars typically run in 150s. No issues with low blood sugars. Tolerating medications well without issues.     2. Depression: Is getting ready to go back to work. Has been working closely with psychiatrist on medication management, and overall feels that things are improving. Was worse over the summer and fall with her niece committing suicide and her partners recent health issues. Does feel a bit overwhelmed about the idea of going back to work but thinks that she will be able to do it.     3. Needs a prescription for standing desk at work. Reports that her L arm will start to go numb,  "including pinky and ring finger, if she is working at her desk for too long. Thinks a standing desk would help.   Problem list and histories reviewed & adjusted, as indicated.  Additional history: as documented    Patient Active Problem List   Diagnosis     Insomnia     GERD (gastroesophageal reflux disease)     Hyperlipidemia with target LDL less than 100     Anxiety     Psoriasis     Tobacco abuse     Moderate episode of recurrent major depressive disorder (H)     Migraine with aura and without status migrainosus, not intractable     Type 2 diabetes mellitus without complication, without long-term current use of insulin (H)     Past Surgical History:   Procedure Laterality Date     ARTHROSCOPY SHOULDER RT/LT      forzen should repair RIGHT     AS ABLATION, ENDOMETRIAL, THERMAL, W/O HYSTEROSCOPIC GUIDANCE       HYSTERECTOMY VAGINAL, BILATERAL SALPINGO-OOPHERECTOMY, COMBINED         Social History   Substance Use Topics     Smoking status: Current Every Day Smoker     Packs/day: 0.00     Years: 35.00     Types: Cigarettes     Smokeless tobacco: Never Used      Comment:  PPD - started at age 14     Alcohol use No     Family History   Problem Relation Age of Onset     DIABETES Other      Cancer - colorectal Father 72      of throat cancer  smoker      CANCER Father      Other Cancer Father      Family History Negative Mother      CANCER Maternal Grandmother      Colon Cancer Paternal Grandmother              Reviewed and updated as needed this visit by clinical staffTobacco  Allergies  Meds  Med Hx  Surg Hx  Fam Hx  Soc Hx        ROS:  Constitutional, MSK, neuro, endo, psych systems are negative, except as otherwise noted.      OBJECTIVE:   /78 (Cuff Size: Adult Regular)  Pulse 109  Temp 98.8  F (37.1  C)  Ht 5' 4.75\" (1.645 m)  Wt 132 lb 1.6 oz (59.9 kg)  SpO2 97%  BMI 22.15 kg/m2  Body mass index is 22.15 kg/(m^2).  GENERAL: healthy, alert and no distress  PSYCH: mentation appears normal, " affect normal/bright  Diabetic foot exam: normal DP and PT pulses, no trophic changes or ulcerative lesions, normal sensory exam and normal monofilament exam    Diagnostic Test Results:  Results for orders placed or performed in visit on 12/28/17   Hemoglobin A1c   Result Value Ref Range    Hemoglobin A1C 7.4 (H) 4.3 - 6.0 %       ASSESSMENT/PLAN:     1. Type 2 diabetes mellitus without complication, without long-term current use of insulin (H)  A1c recently worsening, unsure if this is due to disease progression vs dietary indiscretions with the holidays. Patient recently increased Invokana dose, but this does not seem to have helps significantly. Discussed adding additional medication vs working on dietary changes and following up in 3 months. She would like to work on lifestyle modifications at this time.   - FOOT EXAM  - Hemoglobin A1c; Future    2. Moderate episode of recurrent major depressive disorder (H)  Follows with psychiatry, planning to return to work. Currently on venlafaxine and mirtazapine through psych, feels that symptoms are relatively stable.   - venlafaxine (EFFEXOR-XR) 75 MG 24 hr capsule; Take 3 capsules (225 mg) by mouth daily    3. Lesion of left ulnar nerve  Discussed that standing desk may ore may not help ulnar neuropathy but she would like to try. Letter written.   - order for DME; Equipment being ordered: standing desk  Dispense: 1 each; Refill: 0    Patient Instructions   Work on dietary and exercise changes for the next 3 months, but we may still see your Hemoglobin A1c go up with the mirtazapine, which is fine. If this happens, we will put you on a third medication.     Keep up the great work with all of the progress you are making from a mental health standpoint.     Have your work contact me if they need more information.       Opal Dietz MD  [facility]

## 2017-12-28 NOTE — MR AVS SNAPSHOT
After Visit Summary   12/28/2017    Bharati Villarreal    MRN: [id number]           Patient Information     Date Of Birth          1960        Visit Information        Provider Department      12/28/2017 11:50 AM Opal Freedman MD [facility]        Today's Diagnoses     Type 2 diabetes mellitus without complication, without long-term current use of insulin (H)    -  1    Moderate episode of recurrent major depressive disorder (H)        Lesion of left ulnar nerve          Care Instructions    Work on dietary and exercise changes for the next 3 months, but we may still see your Hemoglobin A1c go up with the mirtazapine, which is fine. If this happens, we will put you on a third medication.     Keep up the great work with all of the progress you are making from a mental health standpoint.     Have your work contact me if they need more information.           Follow-ups after your visit        Who to contact     If you have questions or need follow up information about today's clinic visit or your schedule please contact [facility] directly at [phone].  Normal or non-critical lab and imaging results will be communicated to you by Fuel3Dhart, letter or phone within 4 business days after the clinic has received the results. If you do not hear from us within 7 days, please contact the clinic through Tuteet or phone. If you have a critical or abnormal lab result, we will notify you by phone as soon as possible.  Submit refill requests through [employer] or call your pharmacy and they will forward the refill request to us. Please allow 3 business days for your refill to be completed.          Additional Information About Your Visit        Fuel3Dhart Information     [employer] gives you secure access to your electronic health record. If you see a primary care provider, you can also send messages to your care team and make appointments. If you have questions, please call your primary care  "clinic.  If you do not have a primary care provider, please call [phone] and they will assist you.        Care EveryWhere ID     This is your Care EveryWhere ID. This could be used by other organizations to access your [facility] medical records  [id number]        Your Vitals Were     Pulse Temperature Height Pulse Oximetry BMI (Body Mass Index)       109 98.8  F (37.1  C) 5' 4.75\" (1.645 m) 97% 22.15 kg/m2        Blood Pressure from Last 3 Encounters:   12/28/17 124/78   09/27/17 114/70   06/26/17 100/66    Weight from Last 3 Encounters:   12/28/17 132 lb 1.6 oz (59.9 kg)   09/27/17 129 lb 8 oz (58.7 kg)   06/26/17 133 lb (60.3 kg)              We Performed the Following     FOOT EXAM          Today's Medication Changes          These changes are accurate as of: 12/28/17 11:50 AM.  If you have any questions, ask your nurse or doctor.               Start taking these medicines.        Dose/Directions    order for DME   Used for:  Lesion of left ulnar nerve   Started by:  Opal Freedman MD        Equipment being ordered: standing desk   Quantity:  1 each   Refills:  0         These medicines have changed or have updated prescriptions.        Dose/Directions    venlafaxine 75 MG 24 hr capsule   Commonly known as:  EFFEXOR-XR   This may have changed:    - medication strength  - how much to take   Used for:  Moderate episode of recurrent major depressive disorder (H)   Changed by:  Opal Freedman MD        Dose:  225 mg   Take 3 capsules (225 mg) by mouth daily   Refills:  0         Stop taking these medicines if you haven't already. Please contact your care team if you have questions.     zolpidem 5 MG tablet   Commonly known as:  AMBIEN   Stopped by:  Opal Freedman MD                Where to get your medicines      Some of these will need a paper prescription and others can be bought over the counter.  Ask your nurse if you have questions.     Bring a paper prescription for each of these medications     " order for DME                Primary Care Provider Office Phone # Fax #    Opal Freedman [phone] [address] [place] [address]        Equal Access to Services     GIOVANIROCCO MARCUSMATHEUS : Hadailyn geremias neves ollieo Juanis, waaxda luqadaha, qaybta kaalmada librado, ruiz linoopal josias. So [facility] [phone].    ATENCIÓN: Si habla español, tiene a galvan disposición servicios gratuitos de asistencia lingüística. Llame al [phone].    We comply with applicable federal civil rights laws and [place] laws. We do not discriminate on the basis of race, color, national origin, age, disability, sex, sexual orientation, or gender identity.            Thank you!     Thank you for choosing [facility]  for your care. Our goal is always to provide you with excellent care. Hearing back from our patients is one way we can continue to improve our services. Please take a few minutes to complete the written survey that you may receive in the mail after your visit with us. Thank you!             Your Updated Medication List - Protect others around you: Learn how to safely use, store and throw away your medicines at www.disposemymeds.org.          This list is accurate as of: 12/28/17 11:50 AM.  Always use your most recent med list.                   Brand Name Dispense Instructions for use Diagnosis    ACE NOT PRESCRIBED (INTENTIONAL)     0 each    ACE Inhibitor not prescribed due to Refusal by patient NO PROTEINURIA    Needs smoking cessation education       ACE/ARB/ARNI NOT PRESCRIBED (INTENTIONAL)      Please choose reason not prescribed, below    Type 2 diabetes mellitus without complication, without long-term current use of insulin (H)       acyclovir 5 % ointment    ZOVIRAX    15 g    Apply topically 6 times daily    Cold sore       atorvastatin 20 MG tablet    LIPITOR    90 tablet    Take 1 tablet (20 mg) by mouth daily    Mixed hyperlipidemia       blood glucose  monitoring meter device kit     1 kit    Use to test blood sugars 1 times daily or as directed.    Type 2 diabetes mellitus without complication (H)       * blood glucose monitoring test strip    no brand specified    100 strip    1 strip by In Vitro route 3 times daily    Diabetes mellitus, type 2 (H)       * blood glucose monitoring test strip    no brand specified    2 Box    Use to test blood sugar 3  times daily or as directed.   Use to test 1 strip by in vitro route three times daily    Type 2 diabetes mellitus without complication, without long-term current use of insulin (H)       canagliflozin 300 MG tablet    INVOKANA    90 tablet    Take 1 tablet (300 mg) by mouth every morning (before breakfast)    Type 2 diabetes mellitus without complication, without long-term current use of insulin (H)       fluticasone 50 MCG/ACT spray    FLONASE    1 Bottle    Spray 1-2 sprays into both nostrils daily    Eardrum rupture, left       IBUPROFEN      PM for sleep        LORazepam 0.5 MG tablet    ATIVAN    8 tablet    Take 1 tablet (0.5 mg) by mouth daily    Anxiety       meclizine 25 MG tablet    ANTIVERT    30 tablet    Take 1 tablet (25 mg) by mouth every 6 hours as needed for dizziness    BPPV (benign paroxysmal positional vertigo), unspecified laterality       metFORMIN 500 MG tablet    GLUCOPHAGE    360 tablet    Take 2 tablets (1,000 mg) by mouth 2 times daily (with meals)    Type 2 diabetes mellitus without complication, without long-term current use of insulin (H)       mirtazapine 15 MG tablet    REMERON    30 tablet    Take 1 tablet (15 mg) by mouth At Bedtime        naproxen 500 MG tablet    NAPROSYN    60 tablet    Take 1 tablet (500 mg) by mouth at onset of headache for moderate pain or headaches    Migraine without status migrainosus, not intractable, unspecified migraine type       * nicotine 14 MG/24HR 24 hr patch    NICODERM CQ    30 patch    Place 1 patch onto the skin every 24 hours    Tobacco use  disorder       * nicotine 7 MG/24HR 24 hr patch    NICODERM CQ    30 patch    Place 1 patch onto the skin every 24 hours    Tobacco use disorder       omeprazole 20 MG CR capsule    priLOSEC    90 capsule    Take 1 capsule (20 mg) by mouth daily    Gastroesophageal reflux disease without esophagitis       order for DME     1 each    Equipment being ordered: standing desk    Lesion of left ulnar nerve       SUMAtriptan 100 MG tablet    IMITREX    18 tablet    Take 1 tablet (100 mg) by mouth at onset of headache for migraine    Migraine with aura and without status migrainosus, not intractable       venlafaxine 75 MG 24 hr capsule    EFFEXOR-XR     Take 3 capsules (225 mg) by mouth daily    Moderate episode of recurrent major depressive disorder (H)       * Notice:  This list has 4 medication(s) that are the same as other medications prescribed for you. Read the directions carefully, and ask your doctor or other care provider to review them with you.

## 2018-01-10 ENCOUNTER — TELEPHONE (OUTPATIENT)
Dept: PEDIATRICS | Facility: CLINIC | Age: 58
End: 2018-01-10

## 2018-01-10 NOTE — TELEPHONE ENCOUNTER
Faxed FMLA forms to [employer] Fax# [phone]. Placed a copy in abstracting. Also mailed original copy back to back along with an extra copy per pt's request. Called and left message informing pt that forms are completed and faxed, and that original were mailed to her home.    Janina Aldana MA 1/10/2018 4:41 PM

## 2018-01-10 NOTE — TELEPHONE ENCOUNTER
Forms placed in Dr. Freedman's in basket to be completed. Called, spoke to pt and informed waiting to be completed and will call when faxed. Pt verbalized understanding with no further questions or concerns.   Janina Aldana MA 1/10/2018 10:50 AM

## 2018-01-10 NOTE — TELEPHONE ENCOUNTER
Patient is calling to check on status of FMLA forms-these were sent about two weeks ago.  Have you received them?  Please call Bharati at [phone].  I did give Bharati the fax # to [employer] also.    Hank will drop off forms.  Needs to be completed by Monday  KIRK Herzog RN

## 2018-03-11 DIAGNOSIS — E11.9 TYPE 2 DIABETES MELLITUS WITHOUT COMPLICATION, WITHOUT LONG-TERM CURRENT USE OF INSULIN (H): ICD-10-CM

## 2018-03-12 NOTE — TELEPHONE ENCOUNTER
Requested Prescriptions   Pending Prescriptions Disp Refills     metFORMIN (GLUCOPHAGE) 500 MG tablet [Pharmacy Med Name: METFORMIN 500MG TABLETS]    Last Written Prescription Date:  6/26/2017  Last Fill Quantity: 360,  # refills: 1   Last office visit: 12/28/2017 with prescribing provider:  Opal Freedman     Future Office Visit:   Next 5 appointments (look out 90 days)     Mar 15, 2018  3:10 PM CDT   [facility] with [facility]al [facility]    [address] [place] [address]  [address]  [facility] [address] [phone]                  360 tablet 0     Sig: TAKE 2 TABLETS(1000 MG) BY MOUTH TWICE DAILY WITH MEALS    Biguanide Agents Passed    3/11/2018  1:30 PM       Passed - Blood pressure less than 140/90 in past 6 months    BP Readings from Last 3 Encounters:   12/28/17 124/78   09/27/17 114/70   06/26/17 100/66                Passed - Patient has documented LDL within the past 12 mos.    Recent Labs   Lab Test  06/26/17 0924   LDL  20            Passed - Patient has had a Microalbumin in the past 12 mos.    Recent Labs   Lab Test  06/26/17 0924 08/08/13   MICROALB   --    --   15.6   MICROL  12   < >   --    UMALCR  14.05   < >  7.6    < > = values in this interval not displayed.            Passed - Patient is age 10 or older       Passed - Patient has documented A1c within the specified period of time.    Recent Labs   Lab Test  12/28/17   1052   A1C  7.4*            Passed - Patient's CR is NOT>1.4 OR Patient's EGFR is NOT<45 within past 12 mos.    Recent Labs   Lab Test  06/26/17 0924   GFRESTIMATED  79   GFRESTBLACK  >90   GFR Calc         Recent Labs   Lab Test  06/26/17 0924   CR  0.76            Passed - Patient does NOT have a diagnosis of CHF.       Passed - Patient is not pregnant       Passed - Patient has not had a positive pregnancy test within the past 12 mos.        Passed - Recent (6 mo) or future (30 days) visit within the  "authorizing provider's specialty    Patient had office visit in the last 6 months or has a visit in the next 30 days with authorizing provider or within the authorizing provider's specialty.  See \"Patient Info\" tab in inbasket, or \"Choose Columns\" in Meds & Orders section of the refill encounter.              "

## 2018-03-13 NOTE — TELEPHONE ENCOUNTER
Prescription approved per [facility] Refill Protocol.    Elisa Milligan RN

## 2018-03-15 ENCOUNTER — OFFICE VISIT (OUTPATIENT)
Dept: PEDIATRICS | Facility: CLINIC | Age: 58
End: 2018-03-15
Payer: COMMERCIAL

## 2018-03-15 VITALS
DIASTOLIC BLOOD PRESSURE: 72 MMHG | WEIGHT: 133 LBS | SYSTOLIC BLOOD PRESSURE: 116 MMHG | OXYGEN SATURATION: 98 % | BODY MASS INDEX: 22.16 KG/M2 | HEIGHT: 65 IN | TEMPERATURE: 97.6 F | HEART RATE: 86 BPM | RESPIRATION RATE: 18 BRPM

## 2018-03-15 DIAGNOSIS — E11.9 TYPE 2 DIABETES MELLITUS WITHOUT COMPLICATION, WITHOUT LONG-TERM CURRENT USE OF INSULIN (H): Primary | ICD-10-CM

## 2018-03-15 DIAGNOSIS — N95.1 MENOPAUSAL SYNDROME (HOT FLASHES): ICD-10-CM

## 2018-03-15 LAB — HBA1C MFR BLD: 7.3 % (ref 4.3–6)

## 2018-03-15 PROCEDURE — 83036 HEMOGLOBIN GLYCOSYLATED A1C: CPT | Performed by: INTERNAL MEDICINE

## 2018-03-15 PROCEDURE — 36415 COLL VENOUS BLD VENIPUNCTURE: CPT | Performed by: INTERNAL MEDICINE

## 2018-03-15 PROCEDURE — 99213 OFFICE O/P EST LOW 20 MIN: CPT | Performed by: INTERNAL MEDICINE

## 2018-03-15 RX ORDER — MIRTAZAPINE 45 MG/1
45 TABLET, FILM COATED ORAL
Refills: 1 | COMMUNITY
Start: 2018-01-14 | End: 2018-09-12

## 2018-03-15 ASSESSMENT — ANXIETY QUESTIONNAIRES
7. FEELING AFRAID AS IF SOMETHING AWFUL MIGHT HAPPEN: NOT AT ALL
6. BECOMING EASILY ANNOYED OR IRRITABLE: NOT AT ALL
IF YOU CHECKED OFF ANY PROBLEMS ON THIS QUESTIONNAIRE, HOW DIFFICULT HAVE THESE PROBLEMS MADE IT FOR YOU TO DO YOUR WORK, TAKE CARE OF THINGS AT HOME, OR GET ALONG WITH OTHER PEOPLE: NOT DIFFICULT AT ALL
GAD7 TOTAL SCORE: 0
2. NOT BEING ABLE TO STOP OR CONTROL WORRYING: NOT AT ALL
5. BEING SO RESTLESS THAT IT IS HARD TO SIT STILL: NOT AT ALL
1. FEELING NERVOUS, ANXIOUS, OR ON EDGE: NOT AT ALL
3. WORRYING TOO MUCH ABOUT DIFFERENT THINGS: NOT AT ALL

## 2018-03-15 ASSESSMENT — PATIENT HEALTH QUESTIONNAIRE - PHQ9: 5. POOR APPETITE OR OVEREATING: NOT AT ALL

## 2018-03-15 NOTE — PATIENT INSTRUCTIONS
Try black cohosh or evening primrose for hot flashes. If this doesn't help in a month, come back to see me and we will start on a low dose of hormone replacement therapy.    Otherwise, drop off your FMLA forms (just bring in the copy of last year). And then follow-up in 6 months!

## 2018-03-15 NOTE — MR AVS SNAPSHOT
After Visit Summary   3/15/2018    Bharati Villarreal    MRN: [id number]           Patient Information     Date Of Birth          1960        Visit Information        Provider Department      3/15/2018 3:10 PM [facility] MD [facility]        Today's Diagnoses     Type 2 diabetes mellitus without complication, without long-term current use of insulin (H)    -  1      Care Instructions    Try black cohosh or evening primrose for hot flashes. If this doesn't help in a month, come back to see me and we will start on a low dose of hormone replacement therapy.    Otherwise, drop off your FMLA forms (just bring in the copy of last year). And then follow-up in 6 months!          Follow-ups after your visit        Who to contact     If you have questions or need follow up information about today's clinic visit or your schedule please contact [facility] directly at [phone].  Normal or non-critical lab and imaging results will be communicated to you by [employer] Medical[employer], letter or phone within 4 business days after the clinic has received the results. If you do not hear from us within 7 days, please contact the clinic through [employer] or phone. If you have a critical or abnormal lab result, we will notify you by phone as soon as possible.  Submit refill requests through [employer] or call your pharmacy and they will forward the refill request to us. Please allow 3 business days for your refill to be completed.          Additional Information About Your Visit        MyChart Information     ClickFacts gives you secure access to your electronic health record. If you see a primary care provider, you can also send messages to your care team and make appointments. If you have questions, please call your primary care clinic.  If you do not have a primary care provider, please call [phone] and they will assist you.        Care EveryWhere ID     This is your Care EveryWhere ID. This could be used by  "other organizations to access your [facility] medical records  [id number]        Your Vitals Were     Pulse Temperature Respirations Height Last Period Pulse Oximetry    86 97.6  F (36.4  C) (Oral) 18 5' 4.75\" (1.645 m) (LMP Unknown) 98%    Breastfeeding? BMI (Body Mass Index)                No 22.3 kg/m2           Blood Pressure from Last 3 Encounters:   03/15/18 116/72   12/28/17 124/78   09/27/17 114/70    Weight from Last 3 Encounters:   03/15/18 133 lb (60.3 kg)   12/28/17 132 lb 1.6 oz (59.9 kg)   09/27/17 129 lb 8 oz (58.7 kg)              We Performed the Following     Hemoglobin A1c        Primary Care Provider Office Phone # Fax #    Opal Freedman [phone] [address] [place] [address]        Equal Access to Services     [facility]: Hadii aad ku hadasho Soomaali, waaxda luqadaha, qaybta kaalmada adeegyada, waxay petein haysinain chandana koenig . So [facility] [phone].    ATENCIÓN: Si habla español, tiene a galvan disposición servicios gratuitos de asistencia lingüística. Llame al [phone].    We comply with applicable federal civil rights laws and [place] laws. We do not discriminate on the basis of race, color, national origin, age, disability, sex, sexual orientation, or gender identity.            Thank you!     [facility]  [facility] your care. Our goal is always to provide you with excellent care. Hearing back from our patients is one way we can continue to improve our services. Please take a few minutes to complete the written survey that you may receive in the mail after your visit with us. Thank you!             Your Updated Medication List - Protect others around you: Learn how to safely use, store and throw away your medicines at www.disposemymeds.org.          This list is accurate as of 3/15/18  3:37 PM.  Always use your most recent med list.                   Brand Name Dispense Instructions for use Diagnosis    ACE " NOT PRESCRIBED (INTENTIONAL)     0 each    ACE Inhibitor not prescribed due to Refusal by patient NO PROTEINURIA    Needs smoking cessation education       ACE/ARB/ARNI NOT PRESCRIBED (INTENTIONAL)      Please choose reason not prescribed, below    Type 2 diabetes mellitus without complication, without long-term current use of insulin (H)       atorvastatin 20 MG tablet    LIPITOR    90 tablet    Take 1 tablet (20 mg) by mouth daily    Mixed hyperlipidemia       blood glucose monitoring meter device kit     1 kit    Use to test blood sugars 1 times daily or as directed.    Type 2 diabetes mellitus without complication (H)       * blood glucose monitoring test strip    no brand specified    100 strip    1 strip by In Vitro route 3 times daily    Diabetes mellitus, type 2 (H)       * blood glucose monitoring test strip    no brand specified    2 Box    Use to test blood sugar 3  times daily or as directed.   Use to test 1 strip by in vitro route three times daily    Type 2 diabetes mellitus without complication, without long-term current use of insulin (H)       canagliflozin 300 MG tablet    INVOKANA    90 tablet    Take 1 tablet (300 mg) by mouth every morning (before breakfast)    Type 2 diabetes mellitus without complication, without long-term current use of insulin (H)       fluticasone 50 MCG/ACT spray    FLONASE    1 Bottle    Spray 1-2 sprays into both nostrils daily    Eardrum rupture, left       IBUPROFEN      PM for sleep        LORazepam 0.5 MG tablet    ATIVAN    8 tablet    Take 1 tablet (0.5 mg) by mouth daily    Anxiety       meclizine 25 MG tablet    ANTIVERT    30 tablet    Take 1 tablet (25 mg) by mouth every 6 hours as needed for dizziness    BPPV (benign paroxysmal positional vertigo), unspecified laterality       metFORMIN 500 MG tablet    GLUCOPHAGE    360 tablet    TAKE 2 TABLETS(1000 MG) BY MOUTH TWICE DAILY WITH MEALS    Type 2 diabetes mellitus without complication, without long-term current  use of insulin (H)       * mirtazapine 15 MG tablet    REMERON    30 tablet    Take 1 tablet (15 mg) by mouth At Bedtime        * mirtazapine 45 MG tablet    REMERON     45 mg        naproxen 500 MG tablet    NAPROSYN    60 tablet    Take 1 tablet (500 mg) by mouth at onset of headache for moderate pain or headaches    Migraine without status migrainosus, not intractable, unspecified migraine type       omeprazole 20 MG CR capsule    priLOSEC    90 capsule    Take 1 capsule (20 mg) by mouth daily    Gastroesophageal reflux disease without esophagitis       order for DME     1 each    Equipment being ordered: standing desk    Lesion of left ulnar nerve       SUMAtriptan 100 MG tablet    IMITREX    18 tablet    Take 1 tablet (100 mg) by mouth at onset of headache for migraine    Migraine with aura and without status migrainosus, not intractable       venlafaxine 75 MG 24 hr capsule    EFFEXOR-XR     Take 3 capsules (225 mg) by mouth daily    Moderate episode of recurrent major depressive disorder (H)       * Notice:  This list has 4 medication(s) that are the same as other medications prescribed for you. Read the directions carefully, and ask your doctor or other care provider to review them with you.

## 2018-03-15 NOTE — PROGRESS NOTES
SUBJECTIVE:   Bharati Villarreal is a 57 year old female who presents to clinic today for the following health issues:      Diabetes Follow-up    Patient is checking blood sugars: once daily.  Results are as follows:         suppertime - around 150    Diabetic concerns: None     Symptoms of hypoglycemia (low blood sugar): none     Paresthesias (numbness or burning in feet) or sores: No     Date of last diabetic eye exam: 11/14/2016, will make apt in April    BP Readings from Last 2 Encounters:   03/15/18 116/72   12/28/17 124/78     Hemoglobin A1C (%)   Date Value   03/15/2018 7.3 (H)   12/28/2017 7.4 (H)     LDL Cholesterol Calculated (mg/dL)   Date Value   06/26/2017 20   06/23/2016 37       Amount of exercise or physical activity: None    Problems taking medications regularly: No    Medication side effects: none    Diet: low salt    1. DM follow-up: Tolerating medications well, no side effects. Has been trying to eat more salads, be more active. No symptoms of low blood sugars. Typically BGs run in 150s.    2. Hot flashes: Went through menopause about 10 years ago following oopherectomy (single). Continues to have debilitating hot flashes, although less frequently than she used to. Doesn't think venlafaxine helped with this. Wondering about other options.       Problem list and histories reviewed & adjusted, as indicated.  Additional history: as documented    Patient Active Problem List   Diagnosis     Insomnia     GERD (gastroesophageal reflux disease)     Hyperlipidemia with target LDL less than 100     Anxiety     Psoriasis     Tobacco abuse     Moderate episode of recurrent major depressive disorder (H)     Migraine with aura and without status migrainosus, not intractable     Type 2 diabetes mellitus without complication, without long-term current use of insulin (H)     Past Surgical History:   Procedure Laterality Date     ARTHROSCOPY SHOULDER RT/LT      forzen should repair RIGHT     AS ABLATION, ENDOMETRIAL,  "THERMAL, W/O HYSTEROSCOPIC GUIDANCE       OOPHORECTOMY         Social History   Substance Use Topics     Smoking status: Current Every Day Smoker     Packs/day: 0.00     Years: 35.00     Types: Cigarettes     Smokeless tobacco: Never Used      Comment: 1/2 PPD - started at age 14     Alcohol use No     Family History   Problem Relation Age of Onset     DIABETES Other      Cancer - colorectal Father 72      of throat cancer  smoker      CANCER Father      Other Cancer Father      Family History Negative Mother      CANCER Maternal Grandmother      Colon Cancer Paternal Grandmother            Reviewed and updated as needed this visit by clinical staff  Tobacco  Allergies  Meds  Med Hx  Surg Hx  Fam Hx  Soc Hx        ROS:  Constitutional,, endo, psych systems are negative, except as otherwise noted.    OBJECTIVE:     /72 (BP Location: Right arm, Patient Position: Chair, Cuff Size: Adult Regular)  Pulse 86  Temp 97.6  F (36.4  C) (Oral)  Resp 18  Ht 5' 4.75\" (1.645 m)  Wt 133 lb (60.3 kg)  LMP  (LMP Unknown)  SpO2 98%  Breastfeeding? No  BMI 22.3 kg/m2  Body mass index is 22.3 kg/(m^2).  GENERAL: healthy, alert and no distress  PSYCH: mentation appears normal, affect normal/bright    Diagnostic Test Results:  Results for orders placed or performed in visit on 03/15/18 (from the past 24 hour(s))   Hemoglobin A1c   Result Value Ref Range    Hemoglobin A1C 7.3 (H) 4.3 - 6.0 %       ASSESSMENT/PLAN:     1. Type 2 diabetes mellitus without complication, without long-term current use of insulin (H)  At this point, HgbA1c stable to slightly downtrending and relatively well controlled. Reviewed that ideally would be under 7 but new guidelines give leeway to up to 8. Offered support for lifestyle modifications, follow-up in 6 months.   - Hemoglobin A1c; Future  - Hemoglobin A1c    2. Menopausal syndrome (hot flashes)  Already on venlafaxine without significant improvement. Reviewed HRT risks/benefits, " also discuss supplements. She will try black cohosh/evening primrose. If no improvement will follow-up in 1 month and start low dose HRT.       Patient Instructions   Try black cohosh or evening primrose for hot flashes. If this doesn't help in a month, come back to see me and we will start on a low dose of hormone replacement therapy.    Otherwise, drop off your FMLA forms (just bring in the copy of last year). And then follow-up in 6 months!      Opal Dietz MD  [facility]

## 2018-03-16 ASSESSMENT — PATIENT HEALTH QUESTIONNAIRE - PHQ9: SUM OF ALL RESPONSES TO PHQ QUESTIONS 1-9: 3

## 2018-03-16 ASSESSMENT — ANXIETY QUESTIONNAIRES: GAD7 TOTAL SCORE: 0

## 2018-05-09 DIAGNOSIS — E11.9 TYPE 2 DIABETES MELLITUS WITHOUT COMPLICATION, WITHOUT LONG-TERM CURRENT USE OF INSULIN (H): ICD-10-CM

## 2018-05-09 NOTE — TELEPHONE ENCOUNTER
Requested Prescriptions   Pending Prescriptions Disp Refills     INVOKANA 300 MG tablet [Pharmacy Med Name: INVOKANA 300MG TABLETS]    Last Written Prescription Date:  9/27/2017  Last Fill Quantity: 90,  # refills: 1   Last office visit: 3/15/2018 with prescribing provider:  Opal Freedman     Future Office Visit:     90 tablet 0     Sig: TAKE 1 TABLET(300 MG) BY MOUTH EVERY MORNING BEFORE BREAKFAST    Sodium Glucose Co-Transport Inhibitor Agents Passed    5/9/2018  3:17 PM       Passed - Blood pressure less than 140/90 in past 6 months    BP Readings from Last 3 Encounters:   03/15/18 116/72   12/28/17 124/78   09/27/17 114/70                Passed - Patient has documented LDL within the past 12 mos.    Recent Labs   Lab Test  06/26/17 0924   LDL  20            Passed - Patient has had a Microalbumin in the past 12 mos.    Recent Labs   Lab Test  06/26/17 0924 08/08/13   MICROALB   --    --   15.6   MICROL  12   < >   --    UMALCR  14.05   < >  7.6    < > = values in this interval not displayed.            Passed - Patient has documented A1c within the specified period of time.    Recent Labs   Lab Test  03/15/18   1421   A1C  7.3*            Passed - No creatinine >1.4 or GFR <45 within the past 12 mos    Recent Labs   Lab Test  06/26/17 0924   GFRESTIMATED  79   GFRESTBLACK  >90   GFR Calc         Recent Labs   Lab Test  06/26/17 0924   CR  0.76            Passed - Patient is age 18 or older       Passed - Patient is not pregnant       Passed - Patient has documented normal Potassium within the last 12 mos.    Recent Labs   Lab Test  06/26/17 0924   POTASSIUM  4.3            Passed - Patient has no positive pregnancy test within the past 12 mos.       Passed - Recent (6 mo) or future (30 days) visit within the authorizing provider's specialty    Patient had office visit in the last 6 months or has a visit in the next 30 days with authorizing provider or within the authorizing  "provider's specialty.  See \"Patient Info\" tab in inbasket, or \"Choose Columns\" in Meds & Orders section of the refill encounter.                "

## 2018-05-10 RX ORDER — CANAGLIFLOZIN 300 MG/1
TABLET, FILM COATED ORAL
Qty: 90 TABLET | Refills: 0 | Status: SHIPPED | OUTPATIENT
Start: 2018-05-10 | End: 2018-08-05

## 2018-05-10 NOTE — TELEPHONE ENCOUNTER
Medication is being filled for 1 time refill only due to:  Patient needs to be seen because Due for 6 month diabetes follow up.     Chanda Melissa RN -- [facility]

## 2018-06-01 ENCOUNTER — TRANSFERRED RECORDS (OUTPATIENT)
Dept: HEALTH INFORMATION MANAGEMENT | Facility: CLINIC | Age: 58
End: 2018-06-01

## 2018-06-19 DIAGNOSIS — E11.9 TYPE 2 DIABETES MELLITUS WITHOUT COMPLICATION, WITHOUT LONG-TERM CURRENT USE OF INSULIN (H): ICD-10-CM

## 2018-06-19 NOTE — TELEPHONE ENCOUNTER
Requested Prescriptions   Pending Prescriptions Disp Refills     metFORMIN (GLUCOPHAGE) 500 MG tablet [Pharmacy Med Name: METFORMIN 500MG TABLETS]    Last Written Prescription Date:  3/13/2018  Last Fill Quantity: 360,  # refills: 0   Last office visit: 3/15/2018 with prescribing provider:  Opal Freedman     Future Office Visit:     360 tablet 0     Sig: TAKE 2 TABLETS(1000 MG) BY MOUTH TWICE DAILY WITH MEALS    Biguanide Agents Passed    6/19/2018  5:57 AM       Passed - Blood pressure less than 140/90 in past 6 months    BP Readings from Last 3 Encounters:   03/15/18 116/72   12/28/17 124/78   09/27/17 114/70                Passed - Patient has documented LDL within the past 12 mos.    Recent Labs   Lab Test  06/26/17 0924   LDL  20            Passed - Patient has had a Microalbumin in the past 12 mos.    Recent Labs   Lab Test  06/26/17 0924 08/08/13   MICROALB   --    --   15.6   MICROL  12   < >   --    UMALCR  14.05   < >  7.6    < > = values in this interval not displayed.            Passed - Patient is age 10 or older       Passed - Patient has documented A1c within the specified period of time.    If HgbA1C is 8 or greater, it needs to be on file within the past 3 months.  If less than 8, must be on file within the past 6 months.     Recent Labs   Lab Test  03/15/18   1421   A1C  7.3*            Passed - Patient's CR is NOT>1.4 OR Patient's EGFR is NOT<45 within past 12 mos.    Recent Labs   Lab Test  06/26/17 0924   GFRESTIMATED  79   GFRESTBLACK  >90   GFR Calc         Recent Labs   Lab Test  06/26/17 0924   CR  0.76            Passed - Patient does NOT have a diagnosis of CHF.       Passed - Patient is not pregnant       Passed - Patient has not had a positive pregnancy test within the past 12 mos.        Passed - Recent (6 mo) or future (30 days) visit within the authorizing provider's specialty    Patient had office visit in the last 6 months or has a visit in the next 30  "days with authorizing provider or within the authorizing provider's specialty.  See \"Patient Info\" tab in inbasket, or \"Choose Columns\" in Meds & Orders section of the refill encounter.              "

## 2018-06-20 NOTE — TELEPHONE ENCOUNTER
Prescription approved per [facility] Refill Protocol.    Cheryl SERRATO RN, BSN, PHN  [place] [facility]

## 2018-07-01 DIAGNOSIS — E78.2 MIXED HYPERLIPIDEMIA: ICD-10-CM

## 2018-07-05 RX ORDER — ATORVASTATIN CALCIUM 20 MG/1
TABLET, FILM COATED ORAL
Qty: 30 TABLET | Refills: 0 | Status: SHIPPED | OUTPATIENT
Start: 2018-07-05 | End: 2018-07-29

## 2018-07-06 ENCOUNTER — TELEPHONE (OUTPATIENT)
Dept: PEDIATRICS | Facility: CLINIC | Age: 58
End: 2018-07-06

## 2018-07-06 NOTE — TELEPHONE ENCOUNTER
Panel Management Review      Patient has the following on her problem list: None      Composite cancer screening  Chart review shows that this patient is due/due soon for the following Mammogram  Summary:    Patient is due/failing the following:   MAMMOGRAM    Action needed:   Patient needs office visit for Mammogram.    Type of outreach:    Sent [employer] message.    Questions for provider review:    None                                                                                                                                    Janina Aldana MA 7/6/2018 11:59 AM       Chart routed to Care Team .

## 2018-08-05 DIAGNOSIS — E11.9 TYPE 2 DIABETES MELLITUS WITHOUT COMPLICATION, WITHOUT LONG-TERM CURRENT USE OF INSULIN (H): ICD-10-CM

## 2018-08-06 NOTE — TELEPHONE ENCOUNTER
Requested Prescriptions   Pending Prescriptions Disp Refills     INVOKANA 300 MG tablet [Pharmacy Med Name: INVOKANA 300MG TABLETS]    Last Written Prescription Date:  5/10/2018  Last Fill Quantity: 90,  # refills: 0   Last office visit: 3/15/2018 with prescribing provider:  Opal Freedman     Future Office Visit:     90 tablet 0     Sig: TAKE 1 TABLET(300 MG) BY MOUTH EVERY MORNING BEFORE BREAKFAST    Sodium Glucose Co-Transport Inhibitor Agents Failed    8/5/2018  8:22 AM       Failed - Patient has documented LDL within the past 12 mos.    Recent Labs   Lab Test  06/26/17 0924   LDL  20            Failed - Patient has documented normal Potassium within the last 12 mos.    Recent Labs   Lab Test  06/26/17 0924   POTASSIUM  4.3            Passed - Blood pressure less than 140/90 in past 6 months    BP Readings from Last 3 Encounters:   03/15/18 116/72   12/28/17 124/78   09/27/17 114/70                Passed - Patient has had a Microalbumin in the past 12 mos.    Recent Labs   Lab Test  06/26/17 0924 08/08/13   MICROALB   --    --   15.6   MICROL  12   < >   --    UMALCR  14.05   < >  7.6    < > = values in this interval not displayed.            Passed - Patient has documented A1c within the specified period of time.    If HgbA1C is 8 or greater, it needs to be on file within the past 3 months.  If less than 8, must be on file within the past 6 months.     Recent Labs   Lab Test  03/15/18   1421   A1C  7.3*            Passed - No creatinine >1.4 or GFR <45 within the past 12 mos    Recent Labs   Lab Test  06/26/17 0924   GFRESTIMATED  79   GFRESTBLACK  >90   GFR Calc         Recent Labs   Lab Test  06/26/17 0924   CR  0.76            Passed - Patient is age 18 or older       Passed - Patient is not pregnant       Passed - Patient has no positive pregnancy test within the past 12 mos.       Passed - Recent (6 mo) or future (30 days) visit within the authorizing provider's specialty     "Patient had office visit in the last 6 months or has a visit in the next 30 days with authorizing provider or within the authorizing provider's specialty.  See \"Patient Info\" tab in inbasket, or \"Choose Columns\" in Meds & Orders section of the refill encounter.              "

## 2018-08-08 RX ORDER — CANAGLIFLOZIN 300 MG/1
TABLET, FILM COATED ORAL
Qty: 30 TABLET | Refills: 0 | Status: SHIPPED | OUTPATIENT
Start: 2018-08-08 | End: 2018-09-12

## 2018-08-08 NOTE — TELEPHONE ENCOUNTER
Routing refill request to provider for review/approval because:  Patient needs to be seen because:  Due for diabetes follow up in September.     Chanda Melissa RN -- [facility]

## 2018-08-26 DIAGNOSIS — K21.9 GASTROESOPHAGEAL REFLUX DISEASE WITHOUT ESOPHAGITIS: ICD-10-CM

## 2018-08-27 NOTE — TELEPHONE ENCOUNTER
"Requested Prescriptions   Pending Prescriptions Disp Refills     omeprazole (PRILOSEC) 20 MG CR capsule [Pharmacy Med Name: OMEPRAZOLE 20MG CAPSULES]    Last Written Prescription Date:  6/26/2017  Last Fill Quantity: 90,  # refills: 3   Last office visit: 3/15/2018 with prescribing provider:  Opal Freedman     Future Office Visit:   Next 5 appointments (look out 90 days)     Sep 10, 2018  1:10 PM CDT   [facility] with Opal [facility]   [facility]    [address] [place] [address]  [address] [place] [id number]   [phone]                  90 capsule 0     Sig: TAKE 1 CAPSULE(20 MG) BY MOUTH DAILY    PPI Protocol Passed    8/26/2018  2:50 PM       Passed - Not on Clopidogrel (unless Pantoprazole ordered)       Passed - No diagnosis of osteoporosis on record       Passed - Recent (12 mo) or future (30 days) visit within the authorizing provider's specialty    Patient had office visit in the last 12 months or has a visit in the next 30 days with authorizing provider or within the authorizing provider's specialty.  See \"Patient Info\" tab in inbasket, or \"Choose Columns\" in Meds & Orders section of the refill encounter.           Passed - Patient is age 18 or older       Passed - No active pregnacy on record       Passed - No positive pregnancy test in past 12 months        "

## 2018-08-29 NOTE — TELEPHONE ENCOUNTER
Prescription approved per [facility] Refill Protocol.  Lakesha Li RN  Message handled by Nurse Triage.

## 2018-09-12 ENCOUNTER — OFFICE VISIT (OUTPATIENT)
Dept: PEDIATRICS | Facility: CLINIC | Age: 58
End: 2018-09-12
Payer: COMMERCIAL

## 2018-09-12 VITALS
HEART RATE: 101 BPM | SYSTOLIC BLOOD PRESSURE: 120 MMHG | WEIGHT: 129 LBS | BODY MASS INDEX: 21.63 KG/M2 | DIASTOLIC BLOOD PRESSURE: 70 MMHG | OXYGEN SATURATION: 99 % | TEMPERATURE: 98.9 F

## 2018-09-12 DIAGNOSIS — E78.2 MIXED HYPERLIPIDEMIA: ICD-10-CM

## 2018-09-12 DIAGNOSIS — E11.9 TYPE 2 DIABETES MELLITUS WITHOUT COMPLICATION, WITHOUT LONG-TERM CURRENT USE OF INSULIN (H): Primary | ICD-10-CM

## 2018-09-12 DIAGNOSIS — Z23 NEED FOR PROPHYLACTIC VACCINATION AND INOCULATION AGAINST INFLUENZA: ICD-10-CM

## 2018-09-12 DIAGNOSIS — F33.1 MODERATE EPISODE OF RECURRENT MAJOR DEPRESSIVE DISORDER (H): ICD-10-CM

## 2018-09-12 DIAGNOSIS — Z53.9 ERRONEOUS ENCOUNTER--DISREGARD: Primary | ICD-10-CM

## 2018-09-12 LAB — HBA1C MFR BLD: 8.4 % (ref 0–5.6)

## 2018-09-12 PROCEDURE — 36415 COLL VENOUS BLD VENIPUNCTURE: CPT | Performed by: INTERNAL MEDICINE

## 2018-09-12 PROCEDURE — 83036 HEMOGLOBIN GLYCOSYLATED A1C: CPT | Performed by: INTERNAL MEDICINE

## 2018-09-12 PROCEDURE — 99214 OFFICE O/P EST MOD 30 MIN: CPT | Mod: 25 | Performed by: INTERNAL MEDICINE

## 2018-09-12 PROCEDURE — 90471 IMMUNIZATION ADMIN: CPT | Performed by: INTERNAL MEDICINE

## 2018-09-12 PROCEDURE — 90682 RIV4 VACC RECOMBINANT DNA IM: CPT | Performed by: INTERNAL MEDICINE

## 2018-09-12 ASSESSMENT — PATIENT HEALTH QUESTIONNAIRE - PHQ9
SUM OF ALL RESPONSES TO PHQ QUESTIONS 1-9: 8
10. IF YOU CHECKED OFF ANY PROBLEMS, HOW DIFFICULT HAVE THESE PROBLEMS MADE IT FOR YOU TO DO YOUR WORK, TAKE CARE OF THINGS AT HOME, OR GET ALONG WITH OTHER PEOPLE: VERY DIFFICULT
SUM OF ALL RESPONSES TO PHQ QUESTIONS 1-9: 8

## 2018-09-12 ASSESSMENT — ANXIETY QUESTIONNAIRES
6. BECOMING EASILY ANNOYED OR IRRITABLE: SEVERAL DAYS
GAD7 TOTAL SCORE: 7
7. FEELING AFRAID AS IF SOMETHING AWFUL MIGHT HAPPEN: SEVERAL DAYS
1. FEELING NERVOUS, ANXIOUS, OR ON EDGE: SEVERAL DAYS
GAD7 TOTAL SCORE: 7
7. FEELING AFRAID AS IF SOMETHING AWFUL MIGHT HAPPEN: SEVERAL DAYS
GAD7 TOTAL SCORE: 7
4. TROUBLE RELAXING: SEVERAL DAYS
3. WORRYING TOO MUCH ABOUT DIFFERENT THINGS: SEVERAL DAYS
5. BEING SO RESTLESS THAT IT IS HARD TO SIT STILL: SEVERAL DAYS
2. NOT BEING ABLE TO STOP OR CONTROL WORRYING: SEVERAL DAYS

## 2018-09-12 NOTE — PROGRESS NOTES
"  SUBJECTIVE:   Bharati Villarreal is a 58 year old female who presents to clinic today for the following health issues:    History of Present Illness     Diabetes:     Frequency of checking blood sugars::  Weekly    Diabetic concerns::  None    Hypoglycemia symptoms::  None    Paraesthesia present::  YES    Eye Exam in the last year::  NO    Diabetes Management Resources    Diet:  Regular (no restrictions)  Frequency of exercise:  None  Taking medications regularly:  Yes  Medication side effects:  None  Additional concerns today:  No    Bharati comes in for follow-up of her diabetes. She reports that over this summer she had a \"breakdown\" and was put on Abilify by her psychiatrist. She initially had a lot of energy on this medication and then \"plummeted\", threatening to stab herself with a kitchen knife (which she does not recall). She was admitted, and Abilify was stopped and she was started on Lamotrigine, which she is still gradually increasing.     Overall feels better from a mental health standpoint, still not great but working hard at getting better. She is currently in [facility] doing intensive outpatient treatment. Seeing Dr. Mays at [facility], then will transition back to Dr. Alaniz (her psychiatrist) and therapist. She continues to smoke. Denies any current SI.     She notes that it has been hard to eat healthfully with all of this going on, and has been drinking a lot of soda over the summer and eating out instead of cooking meals at home. She attributes this to worsening blood sugar control. Otherwise doing well with medications and tolerating these without side effects.     Was drinking a lot of coke this summer, didn't cook this summer.     Problem list and histories reviewed & adjusted, as indicated.  Additional history: as documented    Patient Active Problem List   Diagnosis     Insomnia     GERD (gastroesophageal reflux disease)     Hyperlipidemia with target LDL less than 100     Anxiety "     Psoriasis     Tobacco abuse     Moderate episode of recurrent major depressive disorder (H)     Migraine with aura and without status migrainosus, not intractable     Type 2 diabetes mellitus without complication, without long-term current use of insulin (H)     Past Surgical History:   Procedure Laterality Date     ARTHROSCOPY SHOULDER RT/LT      forzen should repair RIGHT     AS ABLATION, ENDOMETRIAL, THERMAL, W/O HYSTEROSCOPIC GUIDANCE       OOPHORECTOMY         Social History   Substance Use Topics     Smoking status: Current Every Day Smoker     Packs/day: 0.00     Years: 35.00     Types: Cigarettes     Smokeless tobacco: Never Used      Comment: 1/2 PPD - started at age 14     Alcohol use No     Family History   Problem Relation Age of Onset     Diabetes Other      Cancer - colorectal Father 72      of throat cancer  smoker      Cancer Father      Other Cancer Father      Family History Negative Mother      Cancer Maternal Grandmother      Colon Cancer Paternal Grandmother            ROS:  Constitutional, HEENT, cardiovascular, pulmonary, gi and psych systems are negative, except as otherwise noted.    OBJECTIVE:     /70  Pulse 101  Temp 98.9  F (37.2  C) (Tympanic)  Wt 129 lb (58.5 kg)  LMP  (LMP Unknown)  SpO2 99%  BMI 21.63 kg/m2  Body mass index is 21.63 kg/(m^2).  GENERAL: healthy, alert and no distress  RESP: lungs clear to auscultation - no rales, rhonchi or wheezes  CV: regular rate and rhythm, normal S1 S2, no S3 or S4, no murmur, click or rub, no peripheral edema and peripheral pulses strong  PSYCH: mentation appears normal, affect normal/bright    Diagnostic Test Results:  Results for orders placed or performed in visit on 18   Hemoglobin A1c   Result Value Ref Range    Hemoglobin A1C 8.4 (H) 0 - 5.6 %       ASSESSMENT/PLAN:     1. Type 2 diabetes mellitus without complication, without long-term current use of insulin (H)  Recently worsening control likely due to some  recent weight gain and dietary indiscretions. Discussed with patient; may be easier to add in additional oral medication such as DDP4 inhibitor or GARRETT while she is undergoing psychiatric treatment rather than try to focus on readjusting her diet, but she would prefer to continue with same medications and focus instead on lifestyle modifications. Discussed monitoring blood sugars; if not trending down in next months she will come back to discuss meds, otherwise if improving follow-up in 3 months.   - Hemoglobin A1c  - canagliflozin (INVOKANA) 300 MG tablet; Take 1 tablet (300 mg) by mouth every morning (before breakfast)  Dispense: 90 tablet; Refill: 1  - metFORMIN (GLUCOPHAGE) 500 MG tablet; TAKE 2 TABLETS(1000 MG) BY MOUTH TWICE DAILY WITH MEALS  Dispense: 360 tablet; Refill: 3    2. Mixed hyperlipidemia  Due for labs.     3. Need for prophylactic vaccination and inoculation against influenza  - FLU VACCINE, (RIV4) RECOMBINANT HA  , IM (FluBlok, egg free) [47475]- >18 YRS ([facility] recommended  50-64 YRS)  - Vaccine Administration, Initial [11542]    4. Moderate episode of recurrent major depressive disorder (H)  Recent acute flare; sounds actually somewhat consistent with bipolar disorder, although hard to know if that was medication induced. Currently on Lamictal and improving through intensive outpatient therapy at [facility][note 3], has good follow-up scheduled with outpt psych and therapy.       Patient Instructions   Keep an eye on your blood sugars, both fasting and non-fasting. Try to cut out the Cokes and the carbs as you can.     I'm hopeful that as your mental health improves your diet will improve and your A1c will go down. If you do not notice your blood sugars trending down I want to see you back sooner to start medication.       Opal Dietz MD  [facility]  Answers for HPI/ROS submitted by the patient on 9/12/2018   PHQ-2 Score: 2  If you checked off any problems, how difficult have these  problems made it for you to do your work, take care of things at home, or get along with other people?: Very difficult  PHQ9 TOTAL SCORE: 8  CHANO 7 TOTAL SCORE: 7      Injectable Influenza Immunization Documentation    1.  Is the person to be vaccinated sick today?   No    2. Does the person to be vaccinated have an allergy to a component   of the vaccine?   No  Egg Allergy Algorithm Link    3. Has the person to be vaccinated ever had a serious reaction   to influenza vaccine in the past?   No    4. Has the person to be vaccinated ever had Guillain-Barré syndrome?   No    Form completed by .Albino JARVIS MA

## 2018-09-12 NOTE — PATIENT INSTRUCTIONS
Keep an eye on your blood sugars, both fasting and non-fasting. Try to cut out the Cokes and the carbs as you can.     I'm hopeful that as your mental health improves your diet will improve and your A1c will go down. If you do not notice your blood sugars trending down I want to see you back sooner to start medication.

## 2018-09-12 NOTE — MR AVS SNAPSHOT
After Visit Summary   9/12/2018    Bharati Villarreal    MRN: [id number]           Patient Information     Date Of Birth          1960        Visit Information        Provider Department      9/12/2018 1:10 PM Opal Freedman MD [facility]        Today's Diagnoses     Need for prophylactic vaccination and inoculation against influenza    -  1    Type 2 diabetes mellitus without complication, without long-term current use of insulin (H)        Mixed hyperlipidemia          Care Instructions    Keep an eye on your blood sugars, both fasting and non-fasting. Try to cut out the Cokes and the carbs as you can.     I'm hopeful that as your mental health improves your diet will improve and your A1c will go down. If you do not notice your blood sugars trending down I want to see you back sooner to start medication.           Follow-ups after your visit        Who to contact     If you have questions or need follow up information about today's clinic visit or your schedule please contact [facility] directly at [phone].  Normal or non-critical lab and imaging results will be communicated to you by Agribotshart, letter or phone within 4 business days after the clinic has received the results. If you do not hear from us within 7 days, please contact the clinic through [email or web address] or phone. If you have a critical or abnormal lab result, we will notify you by phone as soon as possible.  Submit refill requests through Haloband or call your pharmacy and they will forward the refill request to us. Please allow 3 business days for your refill to be completed.          Additional Information About Your Visit        Agribotshart Information     Haloband gives you secure access to your electronic health record. If you see a primary care provider, you can also send messages to your care team and make appointments. If you have questions, please call your primary care clinic.  If you do not have a primary care  provider, please call [phone] and they will assist you.        Care EveryWhere ID     This is your Care EveryWhere ID. This could be used by other organizations to access your [facility] medical records  [id number]        Your Vitals Were     Pulse Temperature Last Period Pulse Oximetry BMI (Body Mass Index)       101 98.9  F (37.2  C) (Tympanic) (LMP Unknown) 99% 21.63 kg/m2        Blood Pressure from Last 3 Encounters:   09/12/18 120/70   03/15/18 116/72   12/28/17 124/78    Weight from Last 3 Encounters:   09/12/18 129 lb (58.5 kg)   03/15/18 133 lb (60.3 kg)   12/28/17 132 lb 1.6 oz (59.9 kg)              We Performed the Following     FLU VACCINE, (RIV4) RECOMBINANT HA  , IM (FluBlok, egg free) [98793]- >18 YRS ([facility] recommended  50-64 YRS)     Hemoglobin A1c     Lipid panel reflex to direct LDL Fasting     Vaccine Administration, Initial [30267]          Today's Medication Changes          These changes are accurate as of 9/12/18  2:13 PM.  If you have any questions, ask your nurse or doctor.               Stop taking these medicines if you haven't already. Please contact your care team if you have questions.     mirtazapine 15 MG tablet   Commonly known as:  REMERON   Stopped by:  Opal Freedman MD           mirtazapine 45 MG tablet   Commonly known as:  REMERON   Stopped by:  Opal Freedman MD           venlafaxine 75 MG 24 hr capsule   Commonly known as:  EFFEXOR-XR   Stopped by:  Opla Freedman MD                    Primary Care Provider Office Phone # Fax #    Opal Freedman [phone] [address] [place] [address]        Equal Access to Services     ROCCO MAYEN AH: Vanessa Olivarez, anand chavis, anaid kaalmada librado, ruiz ferrara. So [facility] [phone].    ATENCIÓN: Si habla español, tiene a galvan disposición servicios gratuitos de asistencia lingüística. Llame al [phone].    We comply with applicable  federal civil rights laws and [place] laws. We do not discriminate on the basis of race, color, national origin, age, disability, sex, sexual orientation, or gender identity.            Thank you!     Thank you for choosing [facility]  for your care. Our goal is always to provide you with excellent care. Hearing back from our patients is one way we can continue to improve our services. Please take a few minutes to complete the written survey that you may receive in the mail after your visit with us. Thank you!             Your Updated Medication List - Protect others around you: Learn how to safely use, store and throw away your medicines at www.disposemymeds.org.          This list is accurate as of 9/12/18  2:13 PM.  Always use your most recent med list.                   Brand Name Dispense Instructions for use Diagnosis    ACE NOT PRESCRIBED (INTENTIONAL)     0 each    ACE Inhibitor not prescribed due to Refusal by patient NO PROTEINURIA    Needs smoking cessation education       ACE/ARB/ARNI NOT PRESCRIBED (INTENTIONAL)      Please choose reason not prescribed, below    Type 2 diabetes mellitus without complication, without long-term current use of insulin (H)       atorvastatin 20 MG tablet    LIPITOR    30 tablet    TAKE 1 TABLET BY MOUTH DAILY    Mixed hyperlipidemia       blood glucose monitoring meter device kit     1 kit    Use to test blood sugars 1 times daily or as directed.    Type 2 diabetes mellitus without complication (H)       * blood glucose monitoring test strip    no brand specified    100 strip    1 strip by In Vitro route 3 times daily    Diabetes mellitus, type 2 (H)       * blood glucose monitoring test strip    no brand specified    2 Box    Use to test blood sugar 3  times daily or as directed.   Use to test 1 strip by in vitro route three times daily    Type 2 diabetes mellitus without complication, without long-term current use of insulin (H)       fluticasone 50 MCG/ACT  spray    FLONASE    1 Bottle    Spray 1-2 sprays into both nostrils daily    Eardrum rupture, left       IBUPROFEN      PM for sleep        INVOKANA 300 MG tablet   Generic drug:  canagliflozin     30 tablet    TAKE 1 TABLET(300 MG) BY MOUTH EVERY MORNING BEFORE BREAKFAST    Type 2 diabetes mellitus without complication, without long-term current use of insulin (H)       LORazepam 0.5 MG tablet    ATIVAN    8 tablet    Take 1 tablet (0.5 mg) by mouth daily    Anxiety       meclizine 25 MG tablet    ANTIVERT    30 tablet    Take 1 tablet (25 mg) by mouth every 6 hours as needed for dizziness    BPPV (benign paroxysmal positional vertigo), unspecified laterality       metFORMIN 500 MG tablet    GLUCOPHAGE    360 tablet    TAKE 2 TABLETS(1000 MG) BY MOUTH TWICE DAILY WITH MEALS    Type 2 diabetes mellitus without complication, without long-term current use of insulin (H)       naproxen 500 MG tablet    NAPROSYN    60 tablet    Take 1 tablet (500 mg) by mouth at onset of headache for moderate pain or headaches    Migraine without status migrainosus, not intractable, unspecified migraine type       omeprazole 20 MG CR capsule    priLOSEC    90 capsule    TAKE 1 CAPSULE(20 MG) BY MOUTH DAILY    Gastroesophageal reflux disease without esophagitis       order for DME     1 each    Equipment being ordered: standing desk    Lesion of left ulnar nerve       SUMAtriptan 100 MG tablet    IMITREX    18 tablet    Take 1 tablet (100 mg) by mouth at onset of headache for migraine    Migraine with aura and without status migrainosus, not intractable       * Notice:  This list has 2 medication(s) that are the same as other medications prescribed for you. Read the directions carefully, and ask your doctor or other care provider to review them with you.

## 2018-09-13 ASSESSMENT — ANXIETY QUESTIONNAIRES: GAD7 TOTAL SCORE: 7

## 2018-09-13 ASSESSMENT — PATIENT HEALTH QUESTIONNAIRE - PHQ9: SUM OF ALL RESPONSES TO PHQ QUESTIONS 1-9: 8

## 2018-09-15 RX ORDER — LAMOTRIGINE 200 MG/1
200 TABLET ORAL DAILY
Qty: 21 TABLET | Refills: 0 | COMMUNITY
Start: 2018-09-12

## 2018-09-16 DIAGNOSIS — E11.9 TYPE 2 DIABETES MELLITUS WITHOUT COMPLICATION, WITHOUT LONG-TERM CURRENT USE OF INSULIN (H): ICD-10-CM

## 2018-09-17 NOTE — TELEPHONE ENCOUNTER
Duplicate.     canagliflozin (INVOKANA) 300 MG tablet was filled on 9/15/2018, qty 90 with 1 refills.     metFORMIN (GLUCOPHAGE) 500 MG tablet was filled on 9/15/2018, qty 360 with 3 refills.

## 2018-09-19 RX ORDER — CANAGLIFLOZIN 300 MG/1
TABLET, FILM COATED ORAL
Qty: 30 TABLET | Refills: 0 | OUTPATIENT
Start: 2018-09-19

## 2018-09-19 NOTE — TELEPHONE ENCOUNTER
Duplicate. Denial sent to pharmacy with note requesting they review and fill requested medication.     Chanda Melissa RN -- [facility]

## 2018-10-02 ENCOUNTER — TELEPHONE (OUTPATIENT)
Dept: PEDIATRICS | Facility: CLINIC | Age: 58
End: 2018-10-02

## 2018-10-02 NOTE — TELEPHONE ENCOUNTER
Patient is calling with concern that her blood sugars are remaining high in spite of trying some dietary changes.  Her FBS is running 245 and after meals her blood sugar is running 250.  Reports that at her last office appointment Dr. Freedman had mentioned starting another oral medication.      Per office appointment note of 09/12-Type 2 diabetes mellitus without complication, without long-term current use of insulin (H)  Recently worsening control likely due to some recent weight gain and dietary indiscretions. Discussed with patient; may be easier to add in additional oral medication such as DDP4 inhibitor or GARRETT while she is undergoing psychiatric treatment rather than try to focus on readjusting her diet, but she would prefer to continue with same medications and focus instead on lifestyle modifications. Discussed monitoring blood sugars; if not trending down in next months she will come back to discuss meds, otherwise if improving follow-up in 3 months.     Advised patient of Dr. Freedman's recommendation, and appointment was scheduled next week.  No further questions.  Will call back if any other questions or concerns.  KIRK Herzog RN

## 2018-10-10 ENCOUNTER — TELEPHONE (OUTPATIENT)
Dept: PEDIATRICS | Facility: CLINIC | Age: 58
End: 2018-10-10

## 2018-10-10 ENCOUNTER — OFFICE VISIT (OUTPATIENT)
Dept: PEDIATRICS | Facility: CLINIC | Age: 58
End: 2018-10-10
Payer: COMMERCIAL

## 2018-10-10 VITALS
BODY MASS INDEX: 21.69 KG/M2 | SYSTOLIC BLOOD PRESSURE: 102 MMHG | WEIGHT: 130.19 LBS | DIASTOLIC BLOOD PRESSURE: 68 MMHG | HEIGHT: 65 IN | TEMPERATURE: 98.5 F | OXYGEN SATURATION: 99 % | HEART RATE: 97 BPM

## 2018-10-10 DIAGNOSIS — M65.30 TRIGGER FINGER, ACQUIRED: ICD-10-CM

## 2018-10-10 DIAGNOSIS — E11.9 TYPE 2 DIABETES MELLITUS WITHOUT COMPLICATION, WITHOUT LONG-TERM CURRENT USE OF INSULIN (H): Primary | ICD-10-CM

## 2018-10-10 DIAGNOSIS — F33.1 MODERATE EPISODE OF RECURRENT MAJOR DEPRESSIVE DISORDER (H): ICD-10-CM

## 2018-10-10 DIAGNOSIS — Z12.31 ENCOUNTER FOR SCREENING MAMMOGRAM FOR BREAST CANCER: ICD-10-CM

## 2018-10-10 PROCEDURE — 99214 OFFICE O/P EST MOD 30 MIN: CPT | Performed by: INTERNAL MEDICINE

## 2018-10-10 RX ORDER — VENLAFAXINE HYDROCHLORIDE 75 MG/1
CAPSULE, EXTENDED RELEASE ORAL
Refills: 1 | COMMUNITY
Start: 2018-08-12 | End: 2019-09-18

## 2018-10-10 RX ORDER — MIRTAZAPINE 30 MG/1
TABLET, FILM COATED ORAL
Refills: 1 | COMMUNITY
Start: 2018-10-08 | End: 2019-09-18

## 2018-10-10 RX ORDER — VENLAFAXINE HYDROCHLORIDE 150 MG/1
CAPSULE, EXTENDED RELEASE ORAL
Refills: 1 | COMMUNITY
Start: 2018-08-12 | End: 2019-09-18

## 2018-10-10 NOTE — MR AVS SNAPSHOT
After Visit Summary   10/10/2018    Bharati Villarreal    MRN: [id number]           Patient Information     Date Of Birth          1960        Visit Information        Provider Department      10/10/2018 11:10 AM Opal Freedman MD [facility] [place]        Today's Diagnoses     Type 2 diabetes mellitus without complication, without long-term current use of insulin (H)    -  1    Trigger finger, acquired        Encounter for screening mammogram for breast cancer          Care Instructions    Diabetes:  -- continue Invokana and metformin as you are  -- start Trulicity injections once per week. Go over this with the pharmacist in detail about how administer.   -- send me a Dynis message in 3 weeks with your blood sugars. We will probably need to go up on the Trulicity dose at that point    Trigger finger  -- follow up with Orthopedic specialist (hand specialist at [facility])    Keep me posted about your mood and how things are going.    Follow-up in about 3 months.           Follow-ups after your visit        Additional Services     ORTHO  REFERRAL       [facility] is referring you to the Orthopedic  Services at [facility].       The  Representative will assist you in the coordination of your Orthopedic and Musculoskeletal Care as prescribed by your physician.    The  Representative will call you within 1 business day to help schedule your appointment, or you may contact the  Representative at:    All areas ~ [phone]     Type of Referral : hand specialist at [facility]       Timeframe requested: Within 2 weeks    Coverage of these services is subject to the terms and limitations of your health insurance plan.  Please call member services at your health plan with any benefit or coverage questions.      If X-rays, CT or MRI's have been performed, please contact the facility where they were done  to arrange for , prior to your scheduled appointment.  Please bring this referral request to your appointment and present it to your specialist.                  Your next 10 appointments already scheduled     Oct 11, 2018  1:20 PM CDT   LAB with EA LAB   [place] [facility] ([facility])    [address]  [address]  [place] [address] [phone]           Please do not eat 10-12 hours before your appointment if you are coming in fasting for labs on lipids, cholesterol, or glucose (sugar). This does not apply to pregnant women. Water, hot tea and black coffee (with nothing added) are okay. Do not drink other fluids, diet soda or chew gum.            Oct 11, 2018  1:45 PM CDT   MA SCREENING DIGITAL BILATERAL with EAMA1   [facility] [place] ([facility])    [address] [place] [address],[address]  [place] [address] [phone]           How do I prepare for my exam? (Food and drink instructions) No Food and Drink Restrictions.  How do I prepare for my exam? (Other instructions) Do not use any powder, lotion or deodorant under your arms or on your breast. If you do, we will ask you to remove it before your exam.  What should I wear: Wear comfortable, two-piece clothing.  How long does the exam take: Most scans will take 15 minutes.  What should I bring: Bring any previous mammograms from other facilities or have them mailed to the breast center.  Do I need a :  No  is needed.  What do I need to tell my doctor: If you have any allergies, tell your care team.  What should I do after the exam: No restrictions, You may resume normal activities.  What is this test: This test is an x-ray of the breast to look for breast disease. The breast is pressed between two plates to flatten and spread the tissue. An X-ray is taken of the breast from different angles.  Who should I call with questions: If you have any questions, please call the Imaging Department  "where you will have your exam. Directions, parking instructions, and other information is available on our website, [email or web address].  Other information about my exam Three-dimensional (3D) mammograms are available at [facility] locations in [facility], [facility], [place], [facility] and [place]. [facility] locations include [place] and [place] campus in [place].  Benefits of 3D mammograms include * Improved rate of cancer detection * Decreases your chance of having to go back for more tests, which means fewer: * \"False-positive\" results (This means that there is an abnormal area but it isn't cancer.) * Invasive testing procedures, such as a biopsy or surgery * Can provide clearer images of the breast if you have dense breast tissue.  *3D mammography is an optional exam that anyone can have with a 2D mammogram. It doesn't replace or take the place of a 2D mammogram. 2D mammograms remain an effective screening test for all women.  Not all insurance companies cover the cost of a 3D mammogram. Check with your insurance. Three-dimensional (3D) mammograms are available at [facility] locations in [facility], [facility], [facility], and [place]. Health locations include [place] and [facility] in [place]. Benefits of 3D mammograms include: - Improved rate of cancer detection - Decreases your chance of having to go back for more tests, which means fewer: - \"False-positive\" results (This means that there is an abnormal area but it isn't cancer.) - Invasive testing procedures, such as a biopsy or surgery - Can provide clearer images of the breast if you have dense breast tissue. 3D mammography is an optional exam that anyone can have with a 2D mammogram. It doesn't replace or take the place of a 2D mammogram. 2D mammograms remain an effective screening test for all women.  Not all insurance companies cover " "the cost of a 3D mammogram. Check with your insurance.              Future tests that were ordered for you today     Open Future Orders        Priority Expected Expires Ordered    Basic metabolic panel Routine  12/10/2018 10/10/2018    Albumin Random Urine Quantitative with Creat Ratio Routine 9/10/2019 10/10/2019 10/10/2018    **TSH with free T4 reflex FUTURE anytime Routine 10/10/2018 10/10/2019 10/10/2018    *MA Screening Digital Bilateral Routine  10/10/2019 10/10/2018            Who to contact     If you have questions or need follow up information about today's clinic visit or your schedule please contact [facility] directly at [phone].  Normal or non-critical lab and imaging results will be communicated to you by DyMyndhart, letter or phone within 4 business days after the clinic has received the results. If you do not hear from us within 7 days, please contact the clinic through [email or web address] or phone. If you have a critical or abnormal lab result, we will notify you by phone as soon as possible.  Submit refill requests through TeamSnap or call your pharmacy and they will forward the refill request to us. Please allow 3 business days for your refill to be completed.          Additional Information About Your Visit        TeamSnap Information     TeamSnap gives you secure access to your electronic health record. If you see a primary care provider, you can also send messages to your care team and make appointments. If you have questions, please call your primary care clinic.  If you do not have a primary care provider, please call [phone] and they will assist you.        Care EveryWhere ID     This is your Care EveryWhere ID. This could be used by other organizations to access your [facility] medical records  [id number]        Your Vitals Were     Pulse Temperature Height Last Period Pulse Oximetry Breastfeeding?    97 98.5  F (36.9  C) (Tympanic) 5' 4.75\" (1.645 m) (LMP Unknown) 99% No    BMI (Body " Mass Index)                   21.83 kg/m2            Blood Pressure from Last 3 Encounters:   10/10/18 102/68   09/12/18 120/70   03/15/18 116/72    Weight from Last 3 Encounters:   10/10/18 130 lb 3 oz (59.1 kg)   09/12/18 129 lb (58.5 kg)   03/15/18 133 lb (60.3 kg)              We Performed the Following     ORTHO  REFERRAL          Today's Medication Changes          These changes are accurate as of 10/10/18 11:44 AM.  If you have any questions, ask your nurse or doctor.               Start taking these medicines.        Dose/Directions    dulaglutide 0.75 MG/0.5ML pen   Commonly known as:  TRULICITY   Used for:  Type 2 diabetes mellitus without complication, without long-term current use of insulin (H)   Started by:  Opal Freedman MD        Dose:  0.75 mg   Inject 0.75 mg Subcutaneous every 7 days for 4 doses   Quantity:  2 mL   Refills:  0            Where to get your medicines      These medications were sent to [facility] [address] [place] [address] [address] AT [facility]  [address], [facility] [address]     Phone:  [phone]     dulaglutide 0.75 MG/0.5ML pen                Primary Care Provider Office Phone # Fax #    Opal Freedman [phone] [address] [place] [address]        Equal Access to Services     [facility] AH: Hadii aad ku hadasho Soomaali, waaxda luqadaha, qaybta kaalmada adeegyada, ruiz ferrara. So [facility] [phone].    ATENCIÓN: Si habla español, tiene a galvan disposición servicios gratuitos de asistencia lingüística. Llame al [phone].    We comply with applicable federal civil rights laws and [place] laws. We do not discriminate on the basis of race, color, national origin, age, disability, sex, sexual orientation, or gender identity.            Thank you!     Thank you for choosing [facility]  for your care. Our goal is always to provide you with  excellent care. Hearing back from our patients is one way we can continue to improve our services. Please take a few minutes to complete the written survey that you may receive in the mail after your visit with us. Thank you!             Your Updated Medication List - Protect others around you: Learn how to safely use, store and throw away your medicines at www.disposemymeds.org.          This list is accurate as of 10/10/18 11:44 AM.  Always use your most recent med list.                   Brand Name Dispense Instructions for use Diagnosis    ACE NOT PRESCRIBED (INTENTIONAL)     0 each    ACE Inhibitor not prescribed due to Refusal by patient NO PROTEINURIA    Needs smoking cessation education       ACE/ARB/ARNI NOT PRESCRIBED (INTENTIONAL)      Please choose reason not prescribed, below    Type 2 diabetes mellitus without complication, without long-term current use of insulin (H)       atorvastatin 20 MG tablet    LIPITOR    30 tablet    TAKE 1 TABLET BY MOUTH DAILY    Mixed hyperlipidemia       blood glucose monitoring meter device kit     1 kit    Use to test blood sugars 1 times daily or as directed.    Type 2 diabetes mellitus without complication (H)       * blood glucose monitoring test strip    no brand specified    100 strip    1 strip by In Vitro route 3 times daily    Diabetes mellitus, type 2 (H)       * blood glucose monitoring test strip    no brand specified    2 Box    Use to test blood sugar 3  times daily or as directed.   Use to test 1 strip by in vitro route three times daily    Type 2 diabetes mellitus without complication, without long-term current use of insulin (H)       canagliflozin 300 MG tablet    INVOKANA    90 tablet    Take 1 tablet (300 mg) by mouth every morning (before breakfast)    Type 2 diabetes mellitus without complication, without long-term current use of insulin (H)       dulaglutide 0.75 MG/0.5ML pen    TRULICITY    2 mL    Inject 0.75 mg Subcutaneous every 7 days for 4  doses    Type 2 diabetes mellitus without complication, without long-term current use of insulin (H)       fluticasone 50 MCG/ACT spray    FLONASE    1 Bottle    Spray 1-2 sprays into both nostrils daily    Eardrum rupture, left       IBUPROFEN      PM for sleep        LAMICTAL 100 MG tablet   Generic drug:  lamoTRIgine     21 tablet    Take 1.5 tablets (150 mg) by mouth daily Take 1/2 tablet daily for 2 weeks, then 1/2 tablet twice daily        LORazepam 0.5 MG tablet    ATIVAN    8 tablet    Take 1 tablet (0.5 mg) by mouth daily    Anxiety       meclizine 25 MG tablet    ANTIVERT    30 tablet    Take 1 tablet (25 mg) by mouth every 6 hours as needed for dizziness    BPPV (benign paroxysmal positional vertigo), unspecified laterality       metFORMIN 500 MG tablet    GLUCOPHAGE    360 tablet    TAKE 2 TABLETS(1000 MG) BY MOUTH TWICE DAILY WITH MEALS    Type 2 diabetes mellitus without complication, without long-term current use of insulin (H)       mirtazapine 30 MG tablet    REMERON          naproxen 500 MG tablet    NAPROSYN    60 tablet    Take 1 tablet (500 mg) by mouth at onset of headache for moderate pain or headaches    Migraine without status migrainosus, not intractable, unspecified migraine type       omeprazole 20 MG CR capsule    priLOSEC    90 capsule    TAKE 1 CAPSULE(20 MG) BY MOUTH DAILY    Gastroesophageal reflux disease without esophagitis       order for DME     1 each    Equipment being ordered: standing desk    Lesion of left ulnar nerve       SUMAtriptan 100 MG tablet    IMITREX    18 tablet    Take 1 tablet (100 mg) by mouth at onset of headache for migraine    Migraine with aura and without status migrainosus, not intractable       * venlafaxine 150 MG 24 hr capsule    EFFEXOR-XR     TK 1 C PO D WITH 75 MG CAPSULE        * venlafaxine 75 MG 24 hr capsule    EFFEXOR-XR     TK ONE C PO QAM WITH 150MG CAPSULE        * Notice:  This list has 4 medication(s) that are the same as other medications  prescribed for you. Read the directions carefully, and ask your doctor or other care provider to review them with you.

## 2018-10-10 NOTE — LETTER
My Depression Action Plan  Name: Bharati Villarreal   Date of Birth 1960  Date: 10/10/2018    My doctor: Opal Freedman   My clinic: [address]  [address]  [facility] [address] [phone]          GREEN    ZONE   Good Control    What it looks like:     Things are going generally well. You have normal up s and down s. You may even feel depressed from time to time, but bad moods usually last less than a day.   What you need to do:  1. Continue to care for yourself (see self care plan)  2. Check your depression survival kit and update it as needed  3. Follow your physician s recommendations including any medication.  4. Do not stop taking medication unless you consult with your physician first.           YELLOW         ZONE Getting Worse    What it looks like:     Depression is starting to interfere with your life.     It may be hard to get out of bed; you may be starting to isolate yourself from others.    Symptoms of depression are starting to last most all day and this has happened for several days.     You may have suicidal thoughts but they are not constant.   What you need to do:     1. Call your care team, your response to treatment will improve if you keep your care team informed of your progress. Yellow periods are signs an adjustment may need to be made.     2. Continue your self-care, even if you have to fake it!    3. Talk to someone in your support network    4. Open up your depression survival kit           RED    ZONE Medical Alert - Get Help    What it looks like:     Depression is seriously interfering with your life.     You may experience these or other symptoms: You can t get out of bed most days, can t work or engage in other necessary activities, you have trouble taking care of basic hygiene, or basic responsibilities, thoughts of suicide or death that will not go away, self-injurious behavior.     What you need to do:  1. Call your care  team and request a same-day appointment. If they are not available (weekends or after hours) call your local crisis line, emergency room or 911.            Depression Self Care Plan / Survival Kit    Self-Care for Depression  Here s the deal. Your body and mind are really not as separate as most people think.  What you do and think affects how you feel and how you feel influences what you do and think. This means if you do things that people who feel good do, it will help you feel better.  Sometimes this is all it takes.  There is also a place for medication and therapy depending on how severe your depression is, so be sure to consult with your medical provider and/ or Behavioral Health Consultant if your symptoms are worsening or not improving.     In order to better manage my stress, I will:    Exercise  Get some form of exercise, every day. This will help reduce pain and release endorphins, the  feel good  chemicals in your brain. This is almost as good as taking antidepressants!  This is not the same as joining a gym and then never going! (they count on that by the way ) It can be as simple as just going for a walk or doing some gardening, anything that will get you moving.      Hygiene   Maintain good hygiene (Get out of bed in the morning, Make your bed, Brush your teeth, Take a shower, and Get dressed like you were going to work, even if you are unemployed).  If your clothes don't fit try to get ones that do.    Diet  I will strive to eat foods that are good for me, drink plenty of water, and avoid excessive sugar, caffeine, alcohol, and other mood-altering substances.  Some foods that are helpful in depression are: complex carbohydrates, B vitamins, flaxseed, fish or fish oil, fresh fruits and vegetables.    Psychotherapy  I agree to participate in Individual Therapy (if recommended).    Medication  If prescribed medications, I agree to take them.  Missing doses can result in serious side effects.  I  understand that drinking alcohol, or other illicit drug use, may cause potential side effects.  I will not stop my medication abruptly without first discussing it with my provider.    Staying Connected With Others  I will stay in touch with my friends, family members, and my primary care provider/team.    Use your imagination  Be creative.  We all have a creative side; it doesn t matter if it s oil painting, sand castles, or mud pies! This will also kick up the endorphins.    Witness Beauty  (AKA stop and smell the roses) Take a look outside, even in mid-winter. Notice colors, textures. Watch the squirrels and birds.     Service to others  Be of service to others.  There is always someone else in need.  By helping others we can  get out of ourselves  and remember the really important things.  This also provides opportunities for practicing all the other parts of the program.    Humor  Laugh and be silly!  Adjust your TV habits for less news and crime-drama and more comedy.    Control your stress  Try breathing deep, massage therapy, biofeedback, and meditation. Find time to relax each day.     My support system    Clinic Contact:  Phone number:    Contact 1:  Phone number:    Contact 2:  Phone number:    Synagogue/:  Phone number:    Therapist:  Phone number:    Local crisis center:    Phone number:    Other community support:  Phone number:

## 2018-10-10 NOTE — PATIENT INSTRUCTIONS
Diabetes:  -- continue Invokana and metformin as you are  -- start Trulicity injections once per week. Go over this with the pharmacist in detail about how administer.   -- send me a Dhingana message in 3 weeks with your blood sugars. We will probably need to go up on the Trulicity dose at that point    Trigger finger  -- follow up with Orthopedic specialist (hand specialist at [facility])    Keep me posted about your mood and how things are going.    Follow-up in about 3 months.

## 2018-10-10 NOTE — TELEPHONE ENCOUNTER
Prior Authorization Retail Medication Request    Medication/Dose: dulaglutide (TRULICITY) 0.75 MG/0.5ML pen  ICD code (if different than what is on RX):  Type 2 diabetes mellitus without complication, without long-term current use of insulin (H) [E11.9]    Previously Tried and Failed:  N/A   Rationale:  Pt currently on invokana, metformin, atorvastatin,     Insurance Name:  [facility]   Insurance ID:  [id number]      Pharmacy Information (if different than what is on RX)  Name:  [facility]   Phone:  [phone]

## 2018-10-10 NOTE — TELEPHONE ENCOUNTER
CENTRAL PRIOR AUTHORIZATION  [phone]    PA Initiation    Medication: dulaglutide (TRULICITY) 0.75 MG/0.5ML pen-INITIATED   Insurance Company: [employer] - Phone [phone] Fax [phone]  Pharmacy Filling the Rx: [facility] [address] [place], [place] - [address] AT [facility] [address]  Filling Pharmacy Phone: [phone]  Filling Pharmacy Fax:    Start Date: 10/10/2018

## 2018-10-11 ENCOUNTER — RADIANT APPOINTMENT (OUTPATIENT)
Dept: MAMMOGRAPHY | Facility: CLINIC | Age: 58
End: 2018-10-11
Payer: COMMERCIAL

## 2018-10-11 ENCOUNTER — MYC MEDICAL ADVICE (OUTPATIENT)
Dept: PEDIATRICS | Facility: CLINIC | Age: 58
End: 2018-10-11

## 2018-10-11 DIAGNOSIS — E11.9 TYPE 2 DIABETES MELLITUS WITHOUT COMPLICATION, WITHOUT LONG-TERM CURRENT USE OF INSULIN (H): Primary | ICD-10-CM

## 2018-10-11 DIAGNOSIS — Z12.31 ENCOUNTER FOR SCREENING MAMMOGRAM FOR BREAST CANCER: ICD-10-CM

## 2018-10-11 DIAGNOSIS — E11.9 TYPE 2 DIABETES MELLITUS WITHOUT COMPLICATION, WITHOUT LONG-TERM CURRENT USE OF INSULIN (H): ICD-10-CM

## 2018-10-11 PROCEDURE — 36415 COLL VENOUS BLD VENIPUNCTURE: CPT | Performed by: INTERNAL MEDICINE

## 2018-10-11 PROCEDURE — 82043 UR ALBUMIN QUANTITATIVE: CPT | Performed by: INTERNAL MEDICINE

## 2018-10-11 PROCEDURE — 84443 ASSAY THYROID STIM HORMONE: CPT | Performed by: INTERNAL MEDICINE

## 2018-10-11 PROCEDURE — 80048 BASIC METABOLIC PNL TOTAL CA: CPT | Performed by: INTERNAL MEDICINE

## 2018-10-11 PROCEDURE — 77067 SCR MAMMO BI INCL CAD: CPT | Mod: TC

## 2018-10-11 NOTE — TELEPHONE ENCOUNTER
Prior Authorization Not Needed per Insurance    Medication: dulaglutide (TRULICITY) 0.75 MG/0.5ML pen-PA NOT NEEDED PER INSURANCE   Insurance Company: [employer] - Phone [phone] Fax [phone]  Expected CoPay:      Pharmacy Filling the Rx: [id number] - [place], [place] - [address] AT [address]  Pharmacy Notified: Yes  Patient Notified: No

## 2018-10-12 LAB
ANION GAP SERPL CALCULATED.3IONS-SCNC: 8 MMOL/L (ref 3–14)
BUN SERPL-MCNC: 15 MG/DL (ref 7–30)
CALCIUM SERPL-MCNC: 9.2 MG/DL (ref 8.5–10.1)
CHLORIDE SERPL-SCNC: 107 MMOL/L (ref 94–109)
CO2 SERPL-SCNC: 26 MMOL/L (ref 20–32)
CREAT SERPL-MCNC: 0.72 MG/DL (ref 0.52–1.04)
CREAT UR-MCNC: 40 MG/DL
GFR SERPL CREATININE-BSD FRML MDRD: 84 ML/MIN/1.7M2
GLUCOSE SERPL-MCNC: 197 MG/DL (ref 70–99)
MICROALBUMIN UR-MCNC: 7 MG/L
MICROALBUMIN/CREAT UR: 17.57 MG/G CR (ref 0–25)
POTASSIUM SERPL-SCNC: 4.1 MMOL/L (ref 3.4–5.3)
SODIUM SERPL-SCNC: 141 MMOL/L (ref 133–144)
TSH SERPL DL<=0.005 MIU/L-ACNC: 0.96 MU/L (ref 0.4–4)

## 2018-10-12 NOTE — TELEPHONE ENCOUNTER
See pt's MC message. See PA for trulicity encounter from 10/10 for details.     Will await for clarifications from ptAmy Mcfarland, RN  Triage Nurse

## 2018-10-16 NOTE — TELEPHONE ENCOUNTER
Let's try Bydureon. This is also once per week. I've sent this to her pharmacy. Alternatively, she may be able to download a Trulicity savings card through the [employer] website.    If this is also too expensive we could try a once daily injection as that may be cheaper, or patient can ask her pharmacist if they can tell her if there is a better covered medication on file.    Opal Freedman MD  Internal Medicine-Pediatrics

## 2018-10-16 NOTE — TELEPHONE ENCOUNTER
PA not needed per below and is covered. Patient reports Trulicity is $200.     Routing to Dr. Freedman - Please advise on cheaper alternative for Trulicity.

## 2018-10-16 NOTE — TELEPHONE ENCOUNTER
Reason for call:  Other   Patient called regarding (reason for call): prescription    Additional comments: Patient called back, to stated her medical health insurance company, will not cover medication, etc.    Patient would like alternatives.   Please advise.    Phone number to reach patient:  Cell number on file:    Telephone Information:   Mobile [phone]       Best Time:  ANytime    Can we leave a detailed message on this number?  YES

## 2018-10-16 NOTE — TELEPHONE ENCOUNTER
Call to patient-LM for patient to call back.  Lakesha Li, RAMYA  Message handled by Nurse Triage.

## 2018-10-17 NOTE — TELEPHONE ENCOUNTER
Patient calls back, advised, in agreement. She will check with the pharmacy and call us back if needed.  Lakesha Li RN  Message handled by Nurse Triage.

## 2018-11-13 ENCOUNTER — TRANSFERRED RECORDS (OUTPATIENT)
Dept: HEALTH INFORMATION MANAGEMENT | Facility: CLINIC | Age: 58
End: 2018-11-13

## 2018-11-27 NOTE — PATIENT INSTRUCTIONS
Hold your metformin, Invokana the morning of surgery. OK to take all other morning medications with a small sip of water prior to surgery.    OK to take Bydureon the Saturday prior to surgery.    Avoid aspirin, ibuprofen, Aleve for 5-7 days prior to surgery.    Before Your Surgery      Call your surgeon if there is any change in your health. This includes signs of a cold or flu (such as a sore throat, runny nose, cough, rash or fever).    Do not smoke, drink alcohol or take over the counter medicine (unless your surgeon or primary care doctor tells you to) for the 24 hours before and after surgery.    If you take prescribed drugs: Follow your doctor s orders about which medicines to take and which to stop until after surgery.    Eating and drinking prior to surgery: follow the instructions from your surgeon    Take a shower or bath the night before surgery. Use the soap your surgeon gave you to gently clean your skin. If you do not have soap from your surgeon, use your regular soap. Do not shave or scrub the surgery site.  Wear clean pajamas and have clean sheets on your bed.

## 2018-11-27 NOTE — PROGRESS NOTES
[facility]  [address]  [address]  [place] [address] [phone]  Dept: [phone]    PRE-OP EVALUATION:  Today's date: 2018    Bharati Villarreal (: 1960) presents for pre-operative evaluation assessment as requested by [facility].  She requires evaluation and anesthesia risk assessment prior to undergoing surgery/procedure for treatment of right foot  .    Fax number for surgical facility: [facility] fax# [phone]  Primary Physician: Opal Freedman  Type of Anesthesia Anticipated: to be determined    Patient has a Health Care Directive or Living Will:  NO    Preop Questions 2018   Who is doing your surgery? [facility]   What are you having done? Right foot bone spur    Date of Surgery/Procedure: 18   Facility or Hospital where procedure/surgery will be performed: [facility]   1.  Do you have a history of Heart attack, stroke, stent, coronary bypass surgery, or other heart surgery? No   2.  Do you ever have any pain or discomfort in your chest? No   3.  Do you have a history of  Heart Failure? No   4.   Are you troubled by shortness of breath when:  walking on a level surface, or up a slight hill, or at night? No   5.  Do you currently have a cold, bronchitis or other respiratory infection? No   6.  Do you have a cough, shortness of breath, or wheezing? No   7.  Do you sometimes get pains in the calves of your legs when you walk? No   8. Do you or anyone in your family have previous history of blood clots? UNKNOWN - mother with a history of Factor V deficiency but patient tested negative for this   9.  Do you or does anyone in your family have a serious bleeding problem such as prolonged bleeding following surgeries or cuts? No   10. Have you ever had problems with anemia or been told to take iron pills? No   11. Have you had any abnormal blood loss such as black, tarry or bloody stools, or abnormal vaginal bleeding? No   12.  Have you ever had a blood transfusion? No   13. Have you or any of your relatives ever had problems with anesthesia? No   14. Do you have sleep apnea, excessive snoring or daytime drowsiness? No   15. Do you have any prosthetic heart valves? No   16. Do you have prosthetic joints? No   17. Is there any chance that you may be pregnant? No         HPI:     HPI related to upcoming procedure:     Bharati has a history of bilateral bone spurs in her 1st MTP joints causing significant pain. She had the left one removed a number of years ago and now needs the right one removed due to worsening pain.     See problem list for active medical problems.  Problems all longstanding and stable, except as noted/documented.  See ROS for pertinent symptoms related to these conditions.                                                                                                                                                          .  DEPRESSION: This has been severe recently and patient has been in intensive outpatient therapy and treatment. Mood is currently doing quite well and she has been able to return fully to work. She is currently taking multiple medications, including lamotrigine, mirtazapine, and venlafaxine through her psychiatrist.     DIABETES: patient has a history of Type 2 diabetes, more recently with poor control given her worsening depression. She is on oral an injectable agents as below. She reports that recent fasting blood sugars have been in the 130s and she feels that she has good control of her blood sugars.                                                                                                                           .  HYPERLIPIDEMIA: Patient has a history of hyperlipidemia associated with Type 2 DM, she is stable on atorvastatin.                             MEDICAL HISTORY:     Patient Active Problem List    Diagnosis Date Noted     Type 2 diabetes mellitus without complication, without  long-term current use of insulin (H) 11/14/2016     Priority: Medium     Tobacco abuse 09/01/2016     Priority: Medium     Moderate episode of recurrent major depressive disorder (H) 09/01/2016     Priority: Medium     wean over to effexor   TO COUNSELOR        Migraine with aura and without status migrainosus, not intractable 09/01/2016     Priority: Medium     Psoriasis 08/31/2016     Priority: Medium     Anxiety 08/08/2014     Priority: Medium     Hyperlipidemia with target LDL less than 100 06/02/2014     Priority: Medium     Diagnosis updated by automated process. Provider to review and confirm.       Insomnia      Priority: Medium     GERD (gastroesophageal reflux disease)      Priority: Medium      Past Medical History:   Diagnosis Date     Abnormal glandular Papanicolaou smear of cervix 11/06    Abn. Pap smear (cervix) HPV     Closed fracture of right humerus      Depression      GERD (gastroesophageal reflux disease)      Heart murmur      Hypertension      Insomnia      Migraine      Other specified disorders of rotator cuff syndrome of shoulder and allied disorders 2005    adhesive capsulitis left      Type 2 diabetes, controlled, with neuropathy (H)      Urinary tract infection, site not specified     Recurrent UTI's     Past Surgical History:   Procedure Laterality Date     ARTHROSCOPY SHOULDER RT/LT      forzen should repair RIGHT     AS ABLATION, ENDOMETRIAL, THERMAL, W/O HYSTEROSCOPIC GUIDANCE       OOPHORECTOMY       Current Outpatient Prescriptions   Medication Sig Dispense Refill     ACE NOT PRESCRIBED, INTENTIONAL, ACE Inhibitor not prescribed due to Refusal by patient NO PROTEINURIA 0 each 0     ACE/ARB NOT PRESCRIBED, INTENTIONAL, Please choose reason not prescribed, below       atorvastatin (LIPITOR) 20 MG tablet TAKE 1 TABLET BY MOUTH DAILY 30 tablet 3     blood glucose monitoring (HUSSEIN CONTOUR MONITOR) meter device kit Use to test blood sugars 1 times daily or as directed. 1 kit 0     blood  glucose monitoring (NO BRAND SPECIFIED) test strip Use to test blood sugar 3  times daily or as directed.   Use to test 1 strip by in vitro route three times daily 2 Box 11     canagliflozin (INVOKANA) 300 MG tablet Take 1 tablet (300 mg) by mouth every morning (before breakfast) 90 tablet 1     exenatide ER (BYDUREON BCISE) 2 MG/0.85ML SQ autoinjector for weekly inj Inject 2 mg Subcutaneous every 7 days 10.2 mL 3     fluticasone (FLONASE) 50 MCG/ACT spray Spray 1-2 sprays into both nostrils daily 1 Bottle 11     glucose blood VI test strips strip 1 strip by In Vitro route 3 times daily 100 strip 3     IBUPROFEN PM for sleep       lamoTRIgine (LAMICTAL) 100 MG tablet Take 1.5 tablets (150 mg) by mouth daily Take 1/2 tablet daily for 2 weeks, then 1/2 tablet twice daily 21 tablet 0     LORazepam (ATIVAN) 0.5 MG tablet Take 1 tablet (0.5 mg) by mouth daily 8 tablet 0     meclizine (ANTIVERT) 25 MG tablet Take 1 tablet (25 mg) by mouth every 6 hours as needed for dizziness 30 tablet 1     metFORMIN (GLUCOPHAGE) 500 MG tablet TAKE 2 TABLETS(1000 MG) BY MOUTH TWICE DAILY WITH MEALS 360 tablet 3     mirtazapine (REMERON) 30 MG tablet   1     naproxen (NAPROSYN) 500 MG tablet Take 1 tablet (500 mg) by mouth at onset of headache for moderate pain or headaches 60 tablet 1     omeprazole (PRILOSEC) 20 MG CR capsule TAKE 1 CAPSULE(20 MG) BY MOUTH DAILY 90 capsule 1     order for DME Equipment being ordered: standing desk 1 each 0     SUMAtriptan (IMITREX) 100 MG tablet Take 1 tablet (100 mg) by mouth at onset of headache for migraine 18 tablet 3     venlafaxine (EFFEXOR-XR) 150 MG 24 hr capsule TK 1 C PO D WITH 75 MG CAPSULE  1     venlafaxine (EFFEXOR-XR) 75 MG 24 hr capsule TK ONE C PO QAM WITH 150MG CAPSULE  1     OTC products: Aspirin    Allergies   Allergen Reactions     Abilify [Aripiprazole] Other (See Comments)     psychosis     Sulfa Drugs Rash      Latex Allergy: NO    Social History   Substance Use Topics      "Smoking status: Current Every Day Smoker     Packs/day: 0.00     Years: 35.00     Types: Cigarettes     Smokeless tobacco: Never Used      Comment: 1/2 PPD - started at age 14     Alcohol use No     History   Drug Use No       REVIEW OF SYSTEMS:   Constitutional, neuro, ENT, endocrine, pulmonary, cardiac, gastrointestinal, genitourinary, musculoskeletal, integument and psychiatric systems are negative, except as otherwise noted.    EXAM:   /62 (BP Location: Right arm, Patient Position: Chair, Cuff Size: Adult Regular)  Pulse 112  Temp 97.3  F (36.3  C) (Tympanic)  Ht 5' 4.75\" (1.645 m)  Wt 128 lb 1 oz (58.1 kg)  LMP  (LMP Unknown)  SpO2 99%  Breastfeeding? No  BMI 21.48 kg/m2    GENERAL APPEARANCE: healthy, alert and no distress     EYES: EOMI, PERRL     HENT: ear canals and TM's normal and nose and mouth without ulcers or lesions     NECK: no adenopathy, no asymmetry, masses, or scars and thyroid normal to palpation     RESP: lungs clear to auscultation - no rales, rhonchi or wheezes     CV: regular rates and rhythm, normal S1 S2, no S3 or S4 and no murmur, click or rub     ABDOMEN:  soft, nontender, no HSM or masses and bowel sounds normal     MS: extremities normal- no gross deformities noted, no evidence of inflammation in joints, FROM in all extremities.     SKIN: no suspicious lesions or rashes     NEURO: Normal strength and tone, sensory exam grossly normal, mentation intact and speech normal     PSYCH: mentation appears normal. and affect normal/bright     LYMPHATICS: No cervical adenopathy    DIAGNOSTICS:   EKG: appears normal, NSR, normal axis, normal intervals, no acute ST/T changes c/w ischemia, no LVH by voltage criteria    Recent Labs   Lab Test  10/11/18   1315  09/12/18   1328  03/15/18   1421   06/26/17   0924   08/12/15   0827  05/04/15   1534   08/08/14   1235   HGB   --    --    --    --    --    --   14.1  14.0   --   14.4   PLT   --    --    --    --    --    --    --   276   --   " 239   NA  141   --    --    --   139   < >  141  138   < >  140   POTASSIUM  4.1   --    --    --   4.3   < >  4.1  3.9   < >  3.7   CR  0.72   --    --    --   0.76   < >  0.76  0.81   < >  0.77   A1C   --   8.4*  7.3*   < >  7.5*   < >   --   7.4*   < >   --     < > = values in this interval not displayed.        IMPRESSION:   Reason for surgery/procedure: bone spur of R foot.    The proposed surgical procedure is considered INTERMEDIATE risk.    REVISED CARDIAC RISK INDEX  The patient has the following serious cardiovascular risks for perioperative complications such as (MI, PE, VFib and 3  AV Block):  No serious cardiac risks  INTERPRETATION: 0 risks: Class I (very low risk - 0.4% complication rate)    The patient has the following additional risks for perioperative complications:  No identified additional risks      ICD-10-CM    1. Preop general physical exam Z01.818 EKG 12-lead complete w/read - Clinics   2. Bone spur of foot M77.50    3. Type 2 diabetes mellitus without complication, without long-term current use of insulin (H) E11.9 Hemoglobin A1c     Lipid panel reflex to direct LDL Non-fasting       RECOMMENDATIONS:     --Patient is to take all scheduled medications on the day of surgery EXCEPT for modifications listed below.    Diabetes Medication Use  Hold usual oral and non-insulin diabetic meds (e.g. Metformin, Actos, Glipizide) while NPO. Fine to use exenatide ER the weekend prior to surgery as scheduled.      APPROVAL GIVEN to proceed with proposed procedure, without further diagnostic evaluation       Signed Electronically by: Opal Dietz MD    Copy of this evaluation report is provided to requesting physician.    [facility] Preop Guidelines    Revised Cardiac Risk Index

## 2018-11-28 ENCOUNTER — OFFICE VISIT (OUTPATIENT)
Dept: PEDIATRICS | Facility: CLINIC | Age: 58
End: 2018-11-28
Payer: COMMERCIAL

## 2018-11-28 VITALS
BODY MASS INDEX: 21.34 KG/M2 | HEIGHT: 65 IN | OXYGEN SATURATION: 99 % | TEMPERATURE: 97.3 F | DIASTOLIC BLOOD PRESSURE: 62 MMHG | WEIGHT: 128.06 LBS | SYSTOLIC BLOOD PRESSURE: 110 MMHG | HEART RATE: 112 BPM

## 2018-11-28 DIAGNOSIS — Z01.818 PREOP GENERAL PHYSICAL EXAM: Primary | ICD-10-CM

## 2018-11-28 DIAGNOSIS — M77.50 BONE SPUR OF FOOT: ICD-10-CM

## 2018-11-28 DIAGNOSIS — E11.9 TYPE 2 DIABETES MELLITUS WITHOUT COMPLICATION, WITHOUT LONG-TERM CURRENT USE OF INSULIN (H): ICD-10-CM

## 2018-11-28 LAB
CHOLEST SERPL-MCNC: 102 MG/DL
HBA1C MFR BLD: 7.3 % (ref 0–5.6)
HDLC SERPL-MCNC: 31 MG/DL
LDLC SERPL CALC-MCNC: 34 MG/DL
NONHDLC SERPL-MCNC: 71 MG/DL
TRIGL SERPL-MCNC: 184 MG/DL

## 2018-11-28 PROCEDURE — 93000 ELECTROCARDIOGRAM COMPLETE: CPT | Performed by: INTERNAL MEDICINE

## 2018-11-28 PROCEDURE — 99215 OFFICE O/P EST HI 40 MIN: CPT | Performed by: INTERNAL MEDICINE

## 2018-11-28 PROCEDURE — 83036 HEMOGLOBIN GLYCOSYLATED A1C: CPT | Performed by: INTERNAL MEDICINE

## 2018-11-28 PROCEDURE — 80061 LIPID PANEL: CPT | Performed by: INTERNAL MEDICINE

## 2018-11-28 PROCEDURE — 36415 COLL VENOUS BLD VENIPUNCTURE: CPT | Performed by: INTERNAL MEDICINE

## 2018-11-28 NOTE — MR AVS SNAPSHOT
After Visit Summary   11/28/2018    Bharati Villarreal    MRN: [id number]           Patient Information     Date Of Birth          1960        Visit Information        Provider Department      11/28/2018 8:30 AM Opal Freedman MD [facility]        Today's Diagnoses     Preop general physical exam    -  1    Bone spur of foot        Type 2 diabetes mellitus without complication, without long-term current use of insulin (H)          Care Instructions    Hold your metformin, Invokana the morning of surgery. OK to take all other morning medications with a small sip of water prior to surgery.    OK to take Bydureon the Saturday prior to surgery.    Avoid aspirin, ibuprofen, Aleve for 5-7 days prior to surgery.    Before Your Surgery      Call your surgeon if there is any change in your health. This includes signs of a cold or flu (such as a sore throat, runny nose, cough, rash or fever).    Do not smoke, drink alcohol or take over the counter medicine (unless your surgeon or primary care doctor tells you to) for the 24 hours before and after surgery.    If you take prescribed drugs: Follow your doctor s orders about which medicines to take and which to stop until after surgery.    Eating and drinking prior to surgery: follow the instructions from your surgeon    Take a shower or bath the night before surgery. Use the soap your surgeon gave you to gently clean your skin. If you do not have soap from your surgeon, use your regular soap. Do not shave or scrub the surgery site.  Wear clean pajamas and have clean sheets on your bed.           Follow-ups after your visit        Who to contact     If you have questions or need follow up information about today's clinic visit or your schedule please contact [facility] directly at [phone].  Normal or non-critical lab and imaging results will be communicated to you by MyChart, letter or phone within 4 business days after the clinic has  "received the results. If you do not hear from us within 7 days, please contact the clinic through [employer] or phone. If you have a critical or abnormal lab result, we will notify you by phone as soon as possible.  Submit refill requests through [employer] or call your pharmacy and they will forward the refill request to us. Please allow 3 business days for your refill to be completed.          Additional Information About Your Visit        [employer] Information     [employer] gives you secure access to your electronic health record. If you see a primary care provider, you can also send messages to your care team and make appointments. If you have questions, please call your primary care clinic.  If you do not have a primary care provider, please call [phone] and they will assist you.        Care EveryWhere ID     This is your Care EveryWhere ID. This could be used by other organizations to access your [place] medical records  [id number]        Your Vitals Were     Pulse Temperature Height Last Period Pulse Oximetry Breastfeeding?    112 97.3  F (36.3  C) (Tympanic) 5' 4.75\" (1.645 m) (LMP Unknown) 99% No    BMI (Body Mass Index)                   21.48 kg/m2            Blood Pressure from Last 3 Encounters:   11/28/18 110/62   10/10/18 102/68   09/12/18 120/70    Weight from Last 3 Encounters:   11/28/18 128 lb 1 oz (58.1 kg)   10/10/18 130 lb 3 oz (59.1 kg)   09/12/18 129 lb (58.5 kg)              We Performed the Following     EKG 12-lead complete w/read - Clinics     Hemoglobin A1c     Lipid panel reflex to direct LDL Non-fasting        Primary Care Provider Office Phone # Fax #    Opal Freedman [phone] [address] [place] [address]        Equal Access to Services     [facility] FA[facility]EM : Vanessa Olivarez, anand chavis, anaid pavon, ruiz ferrara. So [facility] [phone].    ATENCIÓN: Si emmy esparmond, diane galvan " disposición servicios gratuitos de asistencia lingüística. Lora escoto [phone].    We comply with applicable federal civil rights laws and [place] laws. We do not discriminate on the basis of race, color, national origin, age, disability, sex, sexual orientation, or gender identity.            Thank you!     Thank you for choosing [facility]  for your care. Our goal is always to provide you with excellent care. Hearing back from our patients is one way we can continue to improve our services. Please take a few minutes to complete the written survey that you may receive in the mail after your visit with us. Thank you!             Your Updated Medication List - Protect others around you: Learn how to safely use, store and throw away your medicines at www.disposemymeds.org.          This list is accurate as of 11/28/18  9:04 AM.  Always use your most recent med list.                   Brand Name Dispense Instructions for use Diagnosis    ACE NOT PRESCRIBED (INTENTIONAL)     0 each    ACE Inhibitor not prescribed due to Refusal by patient NO PROTEINURIA    Needs smoking cessation education       ACE/ARB/ARNI NOT PRESCRIBED    INTENTIONAL     Please choose reason not prescribed, below    Type 2 diabetes mellitus without complication, without long-term current use of insulin (H)       atorvastatin 20 MG tablet    LIPITOR    30 tablet    TAKE 1 TABLET BY MOUTH DAILY    Mixed hyperlipidemia       blood glucose monitoring meter device kit     1 kit    Use to test blood sugars 1 times daily or as directed.    Type 2 diabetes mellitus without complication (H)       * blood glucose monitoring test strip    NO BRAND SPECIFIED    100 strip    1 strip by In Vitro route 3 times daily    Diabetes mellitus, type 2 (H)       * blood glucose monitoring test strip    NO BRAND SPECIFIED    2 Box    Use to test blood sugar 3  times daily or as directed.   Use to test 1 strip by in vitro route three times daily    Type 2  diabetes mellitus without complication, without long-term current use of insulin (H)       canagliflozin 300 MG tablet    INVOKANA    90 tablet    Take 1 tablet (300 mg) by mouth every morning (before breakfast)    Type 2 diabetes mellitus without complication, without long-term current use of insulin (H)       exenatide ER 2 MG/0.85ML auto-injector    BYDUREON BCise    10.2 mL    Inject 2 mg Subcutaneous every 7 days    Type 2 diabetes mellitus without complication, without long-term current use of insulin (H)       fluticasone 50 MCG/ACT nasal spray    FLONASE    1 Bottle    Spray 1-2 sprays into both nostrils daily    Eardrum rupture, left       IBUPROFEN      PM for sleep        LAMICTAL 100 MG tablet   Generic drug:  lamoTRIgine     21 tablet    Take 1.5 tablets (150 mg) by mouth daily Take 1/2 tablet daily for 2 weeks, then 1/2 tablet twice daily        LORazepam 0.5 MG tablet    ATIVAN    8 tablet    Take 1 tablet (0.5 mg) by mouth daily    Anxiety       meclizine 25 MG tablet    ANTIVERT    30 tablet    Take 1 tablet (25 mg) by mouth every 6 hours as needed for dizziness    BPPV (benign paroxysmal positional vertigo), unspecified laterality       metFORMIN 500 MG tablet    GLUCOPHAGE    360 tablet    TAKE 2 TABLETS(1000 MG) BY MOUTH TWICE DAILY WITH MEALS    Type 2 diabetes mellitus without complication, without long-term current use of insulin (H)       mirtazapine 30 MG tablet    REMERON          naproxen 500 MG tablet    NAPROSYN    60 tablet    Take 1 tablet (500 mg) by mouth at onset of headache for moderate pain or headaches    Migraine without status migrainosus, not intractable, unspecified migraine type       omeprazole 20 MG DR capsule    priLOSEC    90 capsule    TAKE 1 CAPSULE(20 MG) BY MOUTH DAILY    Gastroesophageal reflux disease without esophagitis       order for DME     1 each    Equipment being ordered: standing desk    Lesion of left ulnar nerve       SUMAtriptan 100 MG tablet    IMITREX     18 tablet    Take 1 tablet (100 mg) by mouth at onset of headache for migraine    Migraine with aura and without status migrainosus, not intractable       * venlafaxine 150 MG 24 hr capsule    EFFEXOR-XR     TK 1 C PO D WITH 75 MG CAPSULE        * venlafaxine 75 MG 24 hr capsule    EFFEXOR-XR     TK ONE C PO QAM WITH 150MG CAPSULE        * Notice:  This list has 4 medication(s) that are the same as other medications prescribed for you. Read the directions carefully, and ask your doctor or other care provider to review them with you.

## 2018-11-28 NOTE — PROGRESS NOTES
Pre- op faxed to [facility] fax# [phone]  Jannia Lambert MA 11/28/2018 4:08 PM

## 2018-12-01 ENCOUNTER — TRANSFERRED RECORDS (OUTPATIENT)
Dept: MULTI SPECIALTY CLINIC | Facility: CLINIC | Age: 58
End: 2018-12-01

## 2018-12-02 DIAGNOSIS — E78.2 MIXED HYPERLIPIDEMIA: ICD-10-CM

## 2018-12-02 NOTE — TELEPHONE ENCOUNTER
"Requested Prescriptions   Pending Prescriptions Disp Refills     atorvastatin (LIPITOR) 20 MG tablet [Pharmacy Med Name: ATORVASTATIN 20MG TABLETS]  Last Written Prescription Date:  08/01/2018  Last Fill Quantity: 30 tablet,  # refills: 3   Last office visit: 11/28/2018 with prescribing provider:  Opal Freedman MD    Future Office Visit:     30 tablet 0     Sig: TAKE 1 TABLET BY MOUTH DAILY    Statins Protocol Passed    12/2/2018  2:31 PM       Passed - LDL on file in past 12 months    Recent Labs   Lab Test  11/28/18   0906   LDL  34            Passed - No abnormal creatine kinase in past 12 months    No lab results found.            Passed - Recent (12 mo) or future (30 days) visit within the authorizing provider's specialty    Patient had office visit in the last 12 months or has a visit in the next 30 days with authorizing provider or within the authorizing provider's specialty.  See \"Patient Info\" tab in inbasket, or \"Choose Columns\" in Meds & Orders section of the refill encounter.             Passed - Patient is age 18 or older       Passed - No active pregnancy on record       Passed - No positive pregnancy test in past 12 months          "

## 2018-12-04 RX ORDER — ATORVASTATIN CALCIUM 20 MG/1
TABLET, FILM COATED ORAL
Qty: 90 TABLET | Refills: 3 | Status: SHIPPED | OUTPATIENT
Start: 2018-12-04 | End: 2019-09-28

## 2018-12-04 NOTE — TELEPHONE ENCOUNTER
Prescription approved per [facility], [facility] or [facility] refill protocol.  Eliza Zabala RN - Triage  [facility]

## 2019-01-02 ENCOUNTER — OFFICE VISIT (OUTPATIENT)
Dept: URGENT CARE | Facility: URGENT CARE | Age: 59
End: 2019-01-02
Payer: COMMERCIAL

## 2019-01-02 VITALS
TEMPERATURE: 98.1 F | OXYGEN SATURATION: 97 % | SYSTOLIC BLOOD PRESSURE: 122 MMHG | HEART RATE: 104 BPM | DIASTOLIC BLOOD PRESSURE: 76 MMHG

## 2019-01-02 DIAGNOSIS — B35.4 TINEA CORPORIS: Primary | ICD-10-CM

## 2019-01-02 PROCEDURE — 99213 OFFICE O/P EST LOW 20 MIN: CPT | Performed by: FAMILY MEDICINE

## 2019-01-02 RX ORDER — KETOCONAZOLE 20 MG/G
CREAM TOPICAL 2 TIMES DAILY
Qty: 60 G | Refills: 1 | Status: SHIPPED | OUTPATIENT
Start: 2019-01-02 | End: 2019-02-12

## 2019-01-02 NOTE — PROGRESS NOTES
SUBJECTIVE:  Bharati Villarreal is a 58 year old female who presents to the clinic today for a rash.    Right buttock rash for the past 1 month, was itchy, tried applying cortaid 10 but now getting worse.  Patient is wondering if she has shingles, did have chickenpox when younger, had shingles vaccination.  No one else with similar rash.  Initially small area, now larger and more.    Has diabetes, is relatively well controlled, last A1C 7.3 in November    Past Medical History:   Diagnosis Date     Abnormal glandular Papanicolaou smear of cervix 11/06    Abn. Pap smear (cervix) HPV     Closed fracture of right humerus      Depression      GERD (gastroesophageal reflux disease)      Heart murmur      Hypertension      Insomnia      Migraine      Other specified disorders of rotator cuff syndrome of shoulder and allied disorders 2005    adhesive capsulitis left      Type 2 diabetes, controlled, with neuropathy (H)      Urinary tract infection, site not specified     Recurrent UTI's     Current Outpatient Medications   Medication Sig Dispense Refill     ACE NOT PRESCRIBED, INTENTIONAL, ACE Inhibitor not prescribed due to Refusal by patient NO PROTEINURIA 0 each 0     ACE/ARB NOT PRESCRIBED, INTENTIONAL, Please choose reason not prescribed, below       atorvastatin (LIPITOR) 20 MG tablet TAKE 1 TABLET BY MOUTH DAILY 90 tablet 3     blood glucose monitoring (HUSSIEN CONTOUR MONITOR) meter device kit Use to test blood sugars 1 times daily or as directed. 1 kit 0     blood glucose monitoring (NO BRAND SPECIFIED) test strip Use to test blood sugar 3  times daily or as directed.   Use to test 1 strip by in vitro route three times daily 2 Box 11     canagliflozin (INVOKANA) 300 MG tablet Take 1 tablet (300 mg) by mouth every morning (before breakfast) 90 tablet 1     exenatide ER (BYDUREON BCISE) 2 MG/0.85ML SQ autoinjector for weekly inj Inject 2 mg Subcutaneous every 7 days 10.2 mL 3     fluticasone (FLONASE) 50 MCG/ACT spray Spray  1-2 sprays into both nostrils daily 1 Bottle 11     glucose blood VI test strips strip 1 strip by In Vitro route 3 times daily 100 strip 3     IBUPROFEN PM for sleep       lamoTRIgine (LAMICTAL) 100 MG tablet Take 1.5 tablets (150 mg) by mouth daily Take 1/2 tablet daily for 2 weeks, then 1/2 tablet twice daily 21 tablet 0     LORazepam (ATIVAN) 0.5 MG tablet Take 1 tablet (0.5 mg) by mouth daily 8 tablet 0     meclizine (ANTIVERT) 25 MG tablet Take 1 tablet (25 mg) by mouth every 6 hours as needed for dizziness 30 tablet 1     metFORMIN (GLUCOPHAGE) 500 MG tablet TAKE 2 TABLETS(1000 MG) BY MOUTH TWICE DAILY WITH MEALS 360 tablet 3     mirtazapine (REMERON) 30 MG tablet   1     naproxen (NAPROSYN) 500 MG tablet Take 1 tablet (500 mg) by mouth at onset of headache for moderate pain or headaches 60 tablet 1     omeprazole (PRILOSEC) 20 MG CR capsule TAKE 1 CAPSULE(20 MG) BY MOUTH DAILY 90 capsule 1     order for DME Equipment being ordered: standing desk 1 each 0     SUMAtriptan (IMITREX) 100 MG tablet Take 1 tablet (100 mg) by mouth at onset of headache for migraine 18 tablet 3     venlafaxine (EFFEXOR-XR) 150 MG 24 hr capsule TK 1 C PO D WITH 75 MG CAPSULE  1     venlafaxine (EFFEXOR-XR) 75 MG 24 hr capsule TK ONE C PO QAM WITH 150MG CAPSULE  1     Social History     Tobacco Use     Smoking status: Current Every Day Smoker     Packs/day: 0.00     Years: 35.00     Pack years: 0.00     Types: Cigarettes     Smokeless tobacco: Never Used     Tobacco comment: 1/2 PPD - started at age 14   Substance Use Topics     Alcohol use: No     Alcohol/week: 0.0 oz       ROS:  Review of systems negative except as stated above.    EXAM:   /76 (BP Location: Right arm, Patient Position: Sitting, Cuff Size: Adult Regular)   Pulse 104   Temp 98.1  F (36.7  C) (Tympanic)   LMP  (LMP Unknown)   SpO2 97%   GENERAL: alert, no acute distress.  SKIN: right buttock with multiple discreet oval/round redden lesions with slightly raised  border, mild scaling.  No pustules, no drainage  PSYCH: mentation appears normal and affect normal/bright    ASSESSMENT/PLAN:  (B35.4) Tinea corporis  (primary encounter diagnosis)  Comment: right buttock  Plan: ketoconazole (NIZORAL) 2 % external cream            Reassurance given, reviewed that rash is not due to shingles.  Rash is most likely due to fungal etiology, recommend to stop using hydrocortisone.  RX ketoconazole cream given.    Follow up with PCP if no resolution of symptoms in 2 weeks.    Donato Johansen MD  January 2, 2019 5:28 PM

## 2019-01-02 NOTE — PATIENT INSTRUCTIONS
Stop hydrocortisone.  Apply topical antifungal cream to rash twice a day for 2 weeks.    Okay to take benadryl for itchiness.      Patient Education     Ringworm of the Skin    Ringworm is a fungal infection of the skin. Despite the name, a worm doesn't cause it. The cause of ringworm is a fungus that infects the outer layers of the skin. It is also not caused by bed bugs, scabies, or lice. These are totally different.  The medical term for ringworm is tinea. It can affect most parts of your body, although it seems to do better in moist areas of the body and around hair. It can be on almost any part of your body, including:    Arms, hands, legs, chest, feet, and back    Scalp    Beard    Groin    Between the toes  Depending on where it is located, sometimes the name changes:    Tinea capitis (scalp)    Tinea cruris (groin)    Tinea corporis (body)    Tinea pedis (feet)  Causes  Ringworm is very common all over the world, including the [place] It can take less than 1 week up to 2 weeks before you develop the infection after being exposed. So, you may not figure out the exact cause.  It is spread through direct contact with:    An infected person or animal    Infected soil, or objects such as towels, clothing, and mcdaniel  Symptoms  At first you might not notice ringworm. Or you may just see a small, red, often raised itchy spot or pimple. Sometimes there may only be one spot. At other times there may be several. Ringworm can look slightly different on different parts of the body, but there are some things are always present:    Irregular, round, oval or ring-shaped, which is why it's called ringworm    Clearer or lighter color at the center, since it spreads from the center of the spot outward    Red or inflamed look    Raised    Itchy    Scaly, dry, or flaky  Home care  Follow these tips to help care for yourself at home:    Leave it alone. Don't scratch at the rash or pick it. This can increase the chance of infection  and scarring.    Take medicine as prescribed. If you were prescribed a cream, apply it exactly as directed. Make sure to put the cream not just on the rash, but also on the skin 1 or 2 inches around it. Medicine by mouth is sometimes needed, particularly for ringworm on the scalp. Take it as directed and until your healthcare provider says to stop.    Keep it from spreading to others. Untreated ringworm of the skin is contagious by skin-to-skin contact. Your child may return to school 2 days after treatment has started.  Prevention  To some degree, prevention depends on what part of your body was affected. In general, the following good hygiene can help.    Clean up after you get dirty or sweaty, or after using a locker room.    When possible, don t share mcdaniel and brushes.    Avoid having your skin and feet wet or damp for long periods.    Wear clean, loose-fitting underwear.  Follow-up care  Follow up with your healthcare provider as advised by our staff if the rash does not improve after 10 days of treatment or if the rash spreads to other areas of the body.  When to seek medical advice  Call your healthcare provider right away if any of these occur:    Redness around the rash gets worse    Fluid drains from the rash    Fever of 100.4 F (38 C) or higher, or as directed by your healthcare provider  Date Last Reviewed: 8/1/2016 2000-2018 [employer]. [address] [place] [address], [place] [address]. All rights reserved. This information is not intended as a substitute for professional medical care. Always follow your healthcare professional's instructions.

## 2019-01-21 ENCOUNTER — OFFICE VISIT (OUTPATIENT)
Dept: URGENT CARE | Facility: URGENT CARE | Age: 59
End: 2019-01-21
Payer: COMMERCIAL

## 2019-01-21 ENCOUNTER — NURSE TRIAGE (OUTPATIENT)
Dept: NURSING | Facility: CLINIC | Age: 59
End: 2019-01-21

## 2019-01-21 VITALS
HEART RATE: 100 BPM | SYSTOLIC BLOOD PRESSURE: 116 MMHG | DIASTOLIC BLOOD PRESSURE: 70 MMHG | OXYGEN SATURATION: 97 % | TEMPERATURE: 98 F

## 2019-01-21 DIAGNOSIS — E11.9 TYPE 2 DIABETES MELLITUS WITHOUT COMPLICATION, WITHOUT LONG-TERM CURRENT USE OF INSULIN (H): ICD-10-CM

## 2019-01-21 DIAGNOSIS — T78.40XA ALLERGIC REACTION, INITIAL ENCOUNTER: Primary | ICD-10-CM

## 2019-01-21 PROCEDURE — 99214 OFFICE O/P EST MOD 30 MIN: CPT | Performed by: PHYSICIAN ASSISTANT

## 2019-01-21 RX ORDER — PREDNISONE 20 MG/1
20 TABLET ORAL DAILY
Qty: 7 TABLET | Refills: 0 | Status: SHIPPED | OUTPATIENT
Start: 2019-01-21 | End: 2019-02-12

## 2019-01-21 RX ORDER — KETOCONAZOLE 20 MG/G
CREAM TOPICAL DAILY
COMMUNITY
End: 2019-09-30

## 2019-01-22 NOTE — PROGRESS NOTES
SUBJECTIVE:  Bharati Villarreal is a 58 year old female who presents to the clinic today for a rash.  Onset of rash was 1 week(s) ago.   Rash is gradual onset.  Location of the rash: face, but comes and goes.  Quality/symptoms of rash: burning and painful   Symptoms are moderate and rash seems to be stable.  Previous history of a similar rash? NO  Recent exposure history: Just started using Ketoconazole on LE for Tinea    Associated symptoms include:Patient denies fever, throat tightness, wheezing, cough, vomiting or URI symptoms.    Past Medical History:   Diagnosis Date     Abnormal glandular Papanicolaou smear of cervix 11/06    Abn. Pap smear (cervix) HPV     Closed fracture of right humerus      Depression      GERD (gastroesophageal reflux disease)      Heart murmur      Hypertension      Insomnia      Migraine      Other specified disorders of rotator cuff syndrome of shoulder and allied disorders 2005    adhesive capsulitis left      Type 2 diabetes, controlled, with neuropathy (H)      Urinary tract infection, site not specified     Recurrent UTI's     Current Outpatient Medications   Medication Sig Dispense Refill     ACE NOT PRESCRIBED, INTENTIONAL, ACE Inhibitor not prescribed due to Refusal by patient NO PROTEINURIA 0 each 0     ACE/ARB NOT PRESCRIBED, INTENTIONAL, Please choose reason not prescribed, below       atorvastatin (LIPITOR) 20 MG tablet TAKE 1 TABLET BY MOUTH DAILY 90 tablet 3     blood glucose monitoring (HUSSEIN CONTOUR MONITOR) meter device kit Use to test blood sugars 1 times daily or as directed. 1 kit 0     blood glucose monitoring (NO BRAND SPECIFIED) test strip Use to test blood sugar 3  times daily or as directed.   Use to test 1 strip by in vitro route three times daily 2 Box 11     canagliflozin (INVOKANA) 300 MG tablet Take 1 tablet (300 mg) by mouth every morning (before breakfast) 90 tablet 1     exenatide ER (BYDUREON BCISE) 2 MG/0.85ML SQ autoinjector for weekly inj Inject 2 mg  Subcutaneous every 7 days 10.2 mL 3     fluticasone (FLONASE) 50 MCG/ACT spray Spray 1-2 sprays into both nostrils daily 1 Bottle 11     glucose blood VI test strips strip 1 strip by In Vitro route 3 times daily 100 strip 3     IBUPROFEN PM for sleep       ketoconazole (NIZORAL) 2 % external cream Apply topically daily       lamoTRIgine (LAMICTAL) 100 MG tablet Take 1.5 tablets (150 mg) by mouth daily Take 1/2 tablet daily for 2 weeks, then 1/2 tablet twice daily 21 tablet 0     LORazepam (ATIVAN) 0.5 MG tablet Take 1 tablet (0.5 mg) by mouth daily 8 tablet 0     meclizine (ANTIVERT) 25 MG tablet Take 1 tablet (25 mg) by mouth every 6 hours as needed for dizziness 30 tablet 1     metFORMIN (GLUCOPHAGE) 500 MG tablet TAKE 2 TABLETS(1000 MG) BY MOUTH TWICE DAILY WITH MEALS 360 tablet 3     mirtazapine (REMERON) 30 MG tablet   1     naproxen (NAPROSYN) 500 MG tablet Take 1 tablet (500 mg) by mouth at onset of headache for moderate pain or headaches 60 tablet 1     omeprazole (PRILOSEC) 20 MG CR capsule TAKE 1 CAPSULE(20 MG) BY MOUTH DAILY 90 capsule 1     order for DME Equipment being ordered: standing desk 1 each 0     predniSONE (DELTASONE) 20 MG tablet Take 20 mg by mouth daily for 7 days. 7 tablet 0     SUMAtriptan (IMITREX) 100 MG tablet Take 1 tablet (100 mg) by mouth at onset of headache for migraine 18 tablet 3     venlafaxine (EFFEXOR-XR) 150 MG 24 hr capsule TK 1 C PO D WITH 75 MG CAPSULE  1     venlafaxine (EFFEXOR-XR) 75 MG 24 hr capsule TK ONE C PO QAM WITH 150MG CAPSULE  1     Social History     Tobacco Use     Smoking status: Current Every Day Smoker     Packs/day: 0.00     Years: 35.00     Pack years: 0.00     Types: Cigarettes     Smokeless tobacco: Never Used     Tobacco comment: 1/2 PPD - started at age 14   Substance Use Topics     Alcohol use: No     Alcohol/week: 0.0 oz       ROS:  Review of systems negative except as stated above.    EXAM:   /70 (Cuff Size: Adult Regular)   Pulse 100    Temp 98  F (36.7  C)   LMP  (LMP Unknown)   SpO2 97%   GENERAL: alert, no acute distress.  SKIN: Rash description:    Distribution: localized  Location: face and neck    Erythematous patches without ttp. Rash does not have defined borders.  GENERAL APPEARANCE: healthy, alert and no distress  EYES: EOMI,  PERRL, conjunctiva clear  NECK: supple, non-tender to palpation, no adenopathy noted  RESP: lungs clear to auscultation - no rales, rhonchi or wheezes  CV: regular rates and rhythm, normal S1 S2, no murmur noted    ASSESSMENT / PLAN:  1. Allergic Reaction  DDX: Erysipelas, cellulitis, urticaria, anaphylaxis  Patient with Red blochy rash on face and neck. NO oral or lips swelling. She does not have wheezing, difficulty swallowing or breathing. No other signs of anaphylaxis. Unclear offending agent, I do not think that this is related to ketoconazole use on leg / butt. Rx prednisone, as I do not want her to use topical steroid cream on whole face. Can also add on Zyrtec. Keep tight control of BG while being on prednisone.   - predniSONE (DELTASONE) 20 MG tablet; Take 20 mg by mouth daily for 7 days.  Dispense: 7 tablet; Refill: 0    I have discussed the patient's diagnosis and my plan of treatment with the patient. We went over any labs or imaging. Patient is aware to come back in with worsening symptoms or if no relief despite treatment plan.  Patient verbalizes understanding. All questions were addressed and answered.   Karen Kitchen PA-C

## 2019-01-22 NOTE — PATIENT INSTRUCTIONS
[facility]  Patient Education     What is Atopic Dermatitis?  Atopic dermatitis (also called eczema) causes chronic skin irritation. It is often found in infants, teens, and adults. This disease often runs in families (is genetic). It may also be linked to allergies, such as hay fever and sometimes asthma. Patches of skin become dry, red, itchy, and scaly. In older adults, abnormally dry skin is often called xerosis. Sometimes eczema is only on the hands or feet. It often improves when the skin is well hydrated. It gets worse when the skin is dry. You can help control symptoms by practicing good self-care. Avoid anything that causes flare-ups (such as sunburn or vigorous scratching).  Where do you have symptoms?  Atopic dermatitis symptoms can appear anywhere on the body. But in most cases they vary based on the person s age. In infants, irritation is often seen on the cheeks, chin, near the mouth, and under the eyelids. In children ages 2 through 10, skin folds, such as the backs of the knees, or in the arm crease, are most often affected. In children 11 and older and in adults, symptoms can affect many areas.  What triggers symptoms?  Symptoms flare because of many things. These include skin dryness, scratching, stress, harsh soaps, and irritants such as dust or wool. Try to avoid anything that causes flare-ups.  Recognizing what causes flare-ups  To figure out what causes atopic dermatitis to flare, keep a list of things that seem to affect your skin. Start by filling in the spaces below. Then keep writing them down in a notebook or diary. The things that affect each person vary. So keep your own list and try to avoid your triggers.     ________________________________________________     ________________________________________________     ________________________________________________  Date Last Reviewed: 2/1/2017 2000-2018 [employer]. [address] [facility], [place] [address]. All rights  reserved. This information is not intended as a substitute for professional medical care. Always follow your healthcare professional's instructions.

## 2019-01-22 NOTE — TELEPHONE ENCOUNTER
Bharati calls and says that she has ringworm on her buttocks and has been applying Ketoconazole to the ringworm. Pt. Says that 1 week ago, she developed red blotches on her face and on her neck. Pt. Says that her eyes are swollen and burning, too. Denies any difficulty breathing or swallowing.    Reason for Disposition    [1] Localized rash is very painful AND [2] no fever    Additional Information    Negative: [1] Sudden onset of rash (within last 2 hours) AND [2] difficulty with breathing or swallowing    Negative: Sounds like a life-threatening emergency to the triager    Negative: Poison ivy, oak, or sumac contact suspected    Negative: Insect bite(s) suspected    Negative: Ringworm suspected (i.e., round pink patch, sometimes looks like ring, usually 1/2 to 1 inch [12-25 mm],  in size, slowly increasing in size)    Negative: Athlete's Foot suspected (i.e., itchy rash between the toes)    Negative: Jock Itch suspected (i.e., itchy rash on inner thighs near genital area)    Negative: Wound infection suspected (i.e., pain, spreading redness, or pus; in a cut, puncture, scrape or sutured wound)    Negative: Impetigo suspected  (i.e., painless infected superficial small sores, less than 1 inch or 2.5 cm, often covered by a soft, yellow-brown scab or crust; sometimes occurring near nasal openings)    Negative: Shingles suspected (i.e., painful rash, multiple small blisters grouped together in one area of body; dermatomal distribution)    Negative: Rash of external female genital area (vulva)    Negative: Rash of penis or scrotum    Negative: Small spot, skin growth, or mole    Negative: Sores or skin ulcer, not a rash    Negative: Localized lump (or swelling) without redness or rash    Negative: [1] Localized purple or blood-colored spots or dots AND [2] not from injury or friction AND [3] fever    Negative: Patient sounds very sick or weak to the triager    Negative: [1] Red area or streak AND [2] fever    Negative:  [1] Rash is painful to touch AND [2] fever    Negative: [1] Looks infected (spreading redness, pus) AND [2] large red area (> 2 in. or 5 cm)    Negative: [1] Looks infected (spreading redness, pus) AND [2] diabetes mellitus or weak immune system (e.g., HIV positive,  cancer chemotherapy, chronic steroid treatment, splenectomy)    Negative: [1] Localized purple or blood-colored spots or dots AND [2] not from injury or friction AND [3] no fever    Negative: [1] Looks infected (spreading redness, pus) AND [2] no fever    Negative: Looks like a boil, infected sore, deep ulcer or other infected rash    Protocols used: RASH OR REDNESS - LOCALIZED-ADULT-

## 2019-01-22 NOTE — NURSING NOTE
Chief Complaint   Patient presents with     Urgent Care     Derm Problem     face and neck rash for 1 week. Red and itching        S ARNOLD, CMA

## 2019-02-08 ENCOUNTER — TELEPHONE (OUTPATIENT)
Dept: PEDIATRICS | Facility: CLINIC | Age: 59
End: 2019-02-08

## 2019-02-08 NOTE — TELEPHONE ENCOUNTER
Called and left patient message with clinic number requesting a call back. Advised patient needs an office visit, Renata Jackson MA

## 2019-02-12 ENCOUNTER — OFFICE VISIT (OUTPATIENT)
Dept: PEDIATRICS | Facility: CLINIC | Age: 59
End: 2019-02-12
Payer: COMMERCIAL

## 2019-02-12 VITALS
SYSTOLIC BLOOD PRESSURE: 110 MMHG | BODY MASS INDEX: 21.18 KG/M2 | TEMPERATURE: 97.4 F | OXYGEN SATURATION: 99 % | WEIGHT: 127.1 LBS | HEART RATE: 98 BPM | DIASTOLIC BLOOD PRESSURE: 62 MMHG | HEIGHT: 65 IN

## 2019-02-12 DIAGNOSIS — F33.1 MODERATE EPISODE OF RECURRENT MAJOR DEPRESSIVE DISORDER (H): ICD-10-CM

## 2019-02-12 DIAGNOSIS — E11.9 TYPE 2 DIABETES MELLITUS WITHOUT COMPLICATION, WITHOUT LONG-TERM CURRENT USE OF INSULIN (H): ICD-10-CM

## 2019-02-12 DIAGNOSIS — G43.109 MIGRAINE WITH AURA AND WITHOUT STATUS MIGRAINOSUS, NOT INTRACTABLE: Primary | ICD-10-CM

## 2019-02-12 PROCEDURE — 99214 OFFICE O/P EST MOD 30 MIN: CPT | Performed by: INTERNAL MEDICINE

## 2019-02-12 RX ORDER — NAPROXEN 500 MG/1
500 TABLET ORAL 2 TIMES DAILY WITH MEALS
Qty: 60 TABLET | Refills: 1 | Status: SHIPPED | OUTPATIENT
Start: 2019-02-12 | End: 2019-06-06

## 2019-02-12 RX ORDER — SUMATRIPTAN 100 MG/1
100 TABLET, FILM COATED ORAL
Qty: 18 TABLET | Refills: 3 | Status: SHIPPED | OUTPATIENT
Start: 2019-02-12

## 2019-02-12 ASSESSMENT — PATIENT HEALTH QUESTIONNAIRE - PHQ9: SUM OF ALL RESPONSES TO PHQ QUESTIONS 1-9: 1

## 2019-02-12 ASSESSMENT — MIFFLIN-ST. JEOR: SCORE: 1163.43

## 2019-02-12 NOTE — PROGRESS NOTES
SUBJECTIVE:   Bharati Villarreal is a 58 year old female who presents to clinic today for the following health issues:      Migraine Follow-Up    Headaches symptoms:  Stable off and on    Frequency: 1-2 a month     Duration of headaches: x 2 days.      Able to do normal daily activities/work with migraines: No -     Rescue/Relief medication:sumatriptan (Imitrex)              Effectiveness: moderate relief    Preventative medication: None    Neurologic complications: No new stroke-like symptoms, loss of vision or speech, numbness or weakness    In the past 4 weeks, how often have you gone to Urgent Care or the emergency room because of your headaches?  0      Amount of exercise or physical activity: None    Problems taking medications regularly: No    Medication side effects: none    Diet: regular (no restrictions)      Pt would also like paperwork for [employer].  Usually misses 2d of work per month.  No triggers identified.  Goes to bed at 9 and get up at 5.  Is not exercising.  Has been having some neck pain which does seem to trigger migraines.  Last night had onset migraine and took imitrex but then couldn't sleep.  Has had daily headache x2wks and taking excedrin to get through work.      No focal neuro symptoms other than migraines.  Does get significant nausea with migraines    DM - checks sugars occasionally, last 2 weeks ago and was 150.  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     PHQ-9 SCORE 3/15/2018 9/12/2018 2/12/2019   PHQ-9 Total Score - - -   PHQ-9 Total Score MyChart - 8 (Mild depression) -   PHQ-9 Total Score 3 8 1        Problem list and histories reviewed & adjusted, as indicated.  Additional history: as documented      Reviewed and updated as needed this visit by clinical staff  Tobacco  Allergies  Meds  Problems  Med Hx  Surg Hx  Fam Hx  Soc Hx        Reviewed and updated as needed this visit by Provider  Tobacco   "Allergies  Meds  Problems  Med Hx  Surg Hx  Fam Hx           OBJECTIVE:     /62 (BP Location: Right arm, Patient Position: Chair, Cuff Size: Adult Regular)   Pulse 98   Temp 97.4  F (36.3  C) (Tympanic)   Ht 1.645 m (5' 4.75\")   Wt 57.7 kg (127 lb 1.6 oz)   LMP  (LMP Unknown)   SpO2 99%   BMI 21.31 kg/m    Body mass index is 21.31 kg/m .  GENERAL: healthy, alert and mild distress  PSYCH: mentation appears normal and affect anxious        ASSESSMENT/PLAN:       1. Migraine with aura and without status migrainosus, not intractable  Discussed need for better prophylaxis given severity and frequency of headaches. Various prophylactic choices have been explained and risks/benefits, including riboflavin, b-blocker, ca channel blocker, TCA, amitriptyline, topamax as well as chiropractic care and acupuncture.  Begin medication per orders for temporary relief and follow-up with chiropractor for treatment and acupuncture.  FMLA forms completed  - SUMAtriptan (IMITREX) 100 MG tablet; Take 1 tablet (100 mg) by mouth at onset of headache for migraine  Dispense: 18 tablet; Refill: 3  - [facility]; Future  - naproxen (NAPROSYN) 500 MG tablet; Take 1 tablet (500 mg) by mouth 2 times daily (with meals) x2wks scheduled the twice daily as needed  Dispense: 60 tablet; Refill: 1    2. Type 2 diabetes mellitus without complication, without long-term current use of insulin (H)  Discussed goals of control, need to follow-up with PCP    3. Moderate episode of recurrent major depressive disorder (H)  Controlled, follow-up with PCP      See Patient Instructions    Iman Piña MD  [facility]  "

## 2019-02-12 NOTE — PATIENT INSTRUCTIONS
Take the naprosyn 1 pill twice daily for 2 weeks then as needed.  Stop excedrin.    They will call you to schedule chiropractic evaluation and acupuncture.      Patient Education     Preventing Migraine Headaches: Triggers  The first step in preventing migraines is to learn what triggers them. You may then be able to control your triggers to avoid or reduce the severity of your migraines.  Know your triggers  Be aware that you may have more than one trigger, and that some triggers may work together. Common migraine triggers include:    Food and nutrition. Skipping meals or not drinking enough water can trigger headaches. So can certain foods, such as caffeine, chocolate, artificial sweeteners, monosodium glutamate (MSG), aged cheese, or sausage.    Alcohol. Red wine and other alcoholic beverages are common migraine triggers.    Chemicals. Scents, cleaning products, gasoline, glue, perfume, and paint can be triggers. So can tobacco smoke, including secondhand smoke.    Emotions. Stress can trigger headaches or make them worse once they start.    Sleep disruption. Staying up late, sleeping late, and traveling across time zones can disrupt your sleep cycle, triggering headaches.    Hormones. Many women notice that migraines tend to happen at a certain point in their menstrual cycle. Birth control pills or hormone replacement therapy may also trigger migraines.    Environment and weather. Air travel, changes in altitude, air pressure changes, hot sun, or bright or flashing lights can be triggers.    Medicine overuse. Frequent use of pain medicines for headache pain can also cause a headache. This may also be called rebound headache.  Control your triggers  These are some of the things you can do to try to control triggers:    Avoid triggers if you can. For example, stay clear of alcohol and foods that trigger your headaches. Use unscented household products. Keep regular sleep habits. Manage stress to help control  emotional triggers.    Change your behavior at times when triggers can't be avoided. For example, make sure to get enough rest and drink plenty of water while you're traveling. Make sure to carry a hat, sunglasses, and your medicines. Be alert for migraine symptoms, so you can treat a migraine early if it happens.  Date Last Reviewed: 5/1/2018 2000-2018 [employer]. [address] [place] [address], [place] [id number]. All rights reserved. This information is not intended as a substitute for professional medical care. Always follow your healthcare professional's instructions.           Patient Education     Preventing Migraine Headaches: Medicines and Lifestyle Changes     Going to bed and getting up at the same time each day, including weekends, may help prevent migraines.     A migraine is a type of severe headache. Having a migraine can be very painful. But there are steps you can take to help prevent migraines.  Medicines to help prevent migraines    Your healthcare provider may prescribe certain medicines to help prevent migraines. These medicines may need to be taken daily. Or they may only need to be taken at times when you re likely to have a migraine.    Common medicines used to help prevent migraines include:  ? Triptans (serotonin receptor agonists)  ? Nonsteroidal anti-inflammatory drugs (such as ibuprofen, available over-the-counter)  ? Beta-blockers  ? Anticonvulsants  ? Tricyclic antidepressants  ? Calcium channel blockers  ? Certain vitamins, minerals, and plant extracts  ? Botulinum toxin injection for certain chronic migraines   ? CGRP (calcitonin gene-related peptide) agnonists are being reviewed by the Food and Drug Administration (FDA)  Lifestyle changes for long-term prevention  Here are some suggestions:    Exercise. Regular exercise can help prevent migraines and improve your health. (If exercise triggers your migraines, talk to your healthcare provider.)    Keep regular habits. Don t  skip or delay meals. Drink plenty of water. And go to bed and get up at about the same time each day. This includes weekends.    Try alternative treatments. These are treatments that don't involve the use of medicines or surgery. They may help relieve symptoms and prevent migraines. Some treatment options include biofeedback and acupuncture. Ask your healthcare provider to tell you more about these treatments if you have questions.    Limit caffeine. You may find that caffeine helps relieve pain during an attack. But too much caffeine can also trigger migraines. So, limit the amount of caffeine you consume.  Date Last Reviewed: 5/1/2018 2000-2018 [employer]. [address] [place] [address], [place] [address]. All rights reserved. This information is not intended as a substitute for professional medical care. Always follow your healthcare professional's instructions.

## 2019-03-03 DIAGNOSIS — K21.9 GASTROESOPHAGEAL REFLUX DISEASE WITHOUT ESOPHAGITIS: ICD-10-CM

## 2019-03-04 NOTE — TELEPHONE ENCOUNTER
"Requested Prescriptions   Pending Prescriptions Disp Refills     omeprazole (PRILOSEC) 20 MG DR capsule [Pharmacy Med Name: OMEPRAZOLE 20MG CAPSULES]    Last Written Prescription Date:  8/29/2018  Last Fill Quantity: 90,  # refills: 1   Last office visit: 2/12/2019 with prescribing provider:  Opal Freedman     Future Office Visit:   Next 5 appointments (look out 90 days)    May 02, 2019  1:30 PM CDT  Office Visit with Opal Freedman MD  [facility] ([facility]) [address] [place] [address]  [address]  [facility] [address]  [phone]          90 capsule 0     Sig: TAKE 1 CAPSULE(20 MG) BY MOUTH DAILY    PPI Protocol Passed - 3/3/2019 10:04 AM       Passed - Not on Clopidogrel (unless Pantoprazole ordered)       Passed - No diagnosis of osteoporosis on record       Passed - Recent (12 mo) or future (30 days) visit within the authorizing provider's specialty    Patient had office visit in the last 12 months or has a visit in the next 30 days with authorizing provider or within the authorizing provider's specialty.  See \"Patient Info\" tab in inbasket, or \"Choose Columns\" in Meds & Orders section of the refill encounter.             Passed - Medication is active on med list       Passed - Patient is age 18 or older       Passed - No active pregnacy on record       Passed - No positive pregnancy test in past 12 months          "

## 2019-03-05 NOTE — TELEPHONE ENCOUNTER
Prescription approved per [facility], [facility] or [facility] refill protocol.  Eliza BRADLEY RN - Triage  [facility]

## 2019-03-08 ENCOUNTER — THERAPY VISIT (OUTPATIENT)
Dept: CHIROPRACTIC MEDICINE | Facility: CLINIC | Age: 59
End: 2019-03-08
Attending: INTERNAL MEDICINE
Payer: COMMERCIAL

## 2019-03-08 DIAGNOSIS — M54.2 CERVICALGIA: ICD-10-CM

## 2019-03-08 DIAGNOSIS — G43.109 MIGRAINE WITH AURA AND WITHOUT STATUS MIGRAINOSUS, NOT INTRACTABLE: ICD-10-CM

## 2019-03-08 DIAGNOSIS — M99.01 CERVICAL SEGMENT DYSFUNCTION: Primary | ICD-10-CM

## 2019-03-08 DIAGNOSIS — M99.02 THORACIC SEGMENT DYSFUNCTION: ICD-10-CM

## 2019-03-08 DIAGNOSIS — M99.03 SOMATIC DYSFUNCTION OF LUMBAR REGION: ICD-10-CM

## 2019-03-08 PROCEDURE — 98941 CHIROPRACT MANJ 3-4 REGIONS: CPT | Mod: AT | Performed by: CHIROPRACTOR

## 2019-03-08 PROCEDURE — 97810 ACUP 1/> WO ESTIM 1ST 15 MIN: CPT | Performed by: CHIROPRACTOR

## 2019-03-08 PROCEDURE — 99203 OFFICE O/P NEW LOW 30 MIN: CPT | Mod: 25 | Performed by: CHIROPRACTOR

## 2019-03-08 NOTE — PROGRESS NOTES
Chiropractic Clinic Visit    PCP: Opal Freedman    Bharati Villarreal is a 58 year old female who is seen  in consultation at the request of  Iman Piña M.D. presenting with neck, back, and headache pain. Patient reports that the onset was 20+ years ago for unknown reasons. Patient reports previous x-rays do show degeneration of the cervical spine. Patient reports the HA's are worse with stress and prolonged computer work. Patient reports her neck pain and headaches are becoming more frequent. Headaches occur daily, wakes up with them most days. States having a severe HA 3-4x per month and is unable to work.  Prior to onset, the patient was able to work. Patient reports she has tried conservative treatments such as massage, medications, and chiropractic care without much relief. Patient notes that due to symptoms, they can only sit 1 hour before neck pain increases. Bharati Villarreal notes   sitting rated at a 4/10 painful, difficult and prior to this incident it was 0/10.    Injury: No known injury    Location of Pain: bilateral lower to upper cervical spine pain, mid to lower thoracic spine stiffness, upper lumbar at the following level(s) Occiput, C1 , C5 , C6 , T5 , T10 and L2  Duration of Pain: 20+ years, worse in the last year   Rating of Pain at worst: 8/10  Rating of Pain Currently: 4/10  Symptoms are better with: Nothing  Symptoms are worse with: stress, prolonged sitting, computer work, driving  Additional Features: Denies UE pain. HA's daily, severe headaches 3-4x per month       Health History  as reported by the patient:    How does the patient rate their own health:   Good    Current or past medical history:   Depression, Diabetes and Migraines/headaches, smoking    Medical allergies  Other: sulfa    Past Traumas/Surgeries  None    Family History  This patient has no significant family history    Medications:  Anti-depressants, Pain and Sleep    Occupation:  MA    Primary job tasks:   Computer work and  Prolonged sitting    Barriers as home/work:   none    Additional health Issues:     NA      Review of Systems  Musculoskeletal: as above  Remainder of review of systems is negative including constitutional, CV, pulmonary, GI, Skin and Neurologic except as noted in HPI or medical history.    Past Medical History:   Diagnosis Date     Abnormal glandular Papanicolaou smear of cervix 11/06    Abn. Pap smear (cervix) HPV     Closed fracture of right humerus      Depression      GERD (gastroesophageal reflux disease)      Heart murmur      Hypertension      Insomnia      Migraine      Other specified disorders of rotator cuff syndrome of shoulder and allied disorders 2005    adhesive capsulitis left      Type 2 diabetes, controlled, with neuropathy (H)      Urinary tract infection, site not specified     Recurrent UTI's     Past Surgical History:   Procedure Laterality Date     ARTHROSCOPY SHOULDER RT/LT      forzen should repair RIGHT     AS ABLATION, ENDOMETRIAL, THERMAL, W/O HYSTEROSCOPIC GUIDANCE       OOPHORECTOMY         Objective  LMP  (LMP Unknown)     GENERAL APPEARANCE: healthy, alert and no distress   GAIT: NORMAL  SKIN: no suspicious lesions or rashes  NEURO: Normal strength and tone, mentation intact and speech normal  PSYCH:  mentation appears normal and affect normal/bright      Cervical Spine Exam    Range of Motion:         Full to slight reduction in active and passive ROM forward flexion, extension, lateral rotation, lateral flexion. Neck pain in flexion.    Inspection:         No visible deformity        Decreased cervical lordotic curve    Tender:        paraspinal muscles       upper border of trapezius    Non-Tender:        remainder of cervical spine area    Muscle strength:       C5 (shoulder abduction) symmetric 5/5 Normal       C6 (elbow flexion) symmetric 5/5 Normal       C7 (elbow extension) symmetric 5/5 Normal       C8 (finger abduction, thumb flexion) symmetric 5/5 Normal    Reflexes:         C5 (biceps) symmetric 2 bilaterally       C6 (supinator) symmetric 2 bilaterally       C7 (triceps) symmetric 2 bilaterally    Sensation:       grossly intact througout bilateral upper extremities    Special Tests:       Cervical compression-Neg, foraminal compression left and right-Neg  Distraction - negative    Lymphatics:        no edema noted in the upper extremities       Segmental spinal dysfunction/restrictions found at:  C6 , T5 , L5  and Sacrum       The following soft tissue hypotonicities were observed:Quad lumb: bilateral, referred pain: no  T paraspinals: ache, no  Traps: ache and stiff, referred pain: no    Trigger points were found in:Sub-occipital    Muscle spasm found in:Sub-occipital, T-spine paraspinal and Traps      Radiology:  none    Assessment:    1. Migraine with aura and without status migrainosus, not intractable        RX ordered/plan of care  Anticipated outcomes  Possible risks and side effects    After discussing the risk and benefits of care, patient consented to treatment    Patient's condition:  Patient had restrictions pre-manipulation    Treatment effectiveness:  Post manipulation there is better intersegmental movement and Patient claims to feel looser post manipulation    Plan:    Procedures:    Evaluation and Management:  54569 Moderate level exam 30 min    CMT:  95503 Chiropractic manipulative treatment 3-4 regions performed   Cervical: Diversified-C5, C6 and ActivatorC0, Occiput, C1 , C6, Supine prone  Thoracic: Diversified, T5, T10, Prone  Lumbar: Diversified, L1, L2, Prone    Modalities:  52024: Acupuncture, for 15 minutes:  Points: for neck pain and HA's patient prone-15 min    Therapeutic procedures:  None    Response to Treatment  Patient tolerated the treatment well today.    Prognosis: fair      Treatment plan and goals:  Goals:  SITTING: the patient specific goal is to attain pre-injury status of  2 hours comfortably  Decrease HA's from daily to 3-4x per month.  Decrease severe headaches from 3-4x per month to 1x per month    Frequency of care  Duration of care is estimated to be 8 weeks, from the initial treatment.  It is estimated that the patient will need a total of 6-8 visits to resolve this episode.  For the initial therapeutic trial of care, the frequency is recommended at 1 X week, once daily.  A reevaluation would be clinically appropriate in 8 visits, to determine progress and further course of care.    In-Office Treatment  Evaluation  26759 Chiropractic manipulative treatment 3-4 regions performed   Cervical: Diversified-C5, C6 and ActivatorC0, Occiput, C1 , C6, Supine prone  Thoracic: Diversified, T5, T10, Prone  Lumbar: Diversified, L1, L2, Prone    Modalities:  22395: Acupuncture, for 15 minutes:  Points: for neck pain and HA's patient prone-15 min    Recommendations:    Instructions:  ice 20 minutes every other hour as needed    Follow-up:  Return to care in one week.     Disclaimer: This note consists of symbols derived from keyboarding, dictation and/or voice recognition software. As a result, there may be errors in the script that have gone undetected. Please consider this when interpreting information found in this chart.

## 2019-03-10 DIAGNOSIS — E11.9 TYPE 2 DIABETES MELLITUS WITHOUT COMPLICATION, WITHOUT LONG-TERM CURRENT USE OF INSULIN (H): ICD-10-CM

## 2019-03-13 RX ORDER — CANAGLIFLOZIN 300 MG/1
TABLET, FILM COATED ORAL
Qty: 90 TABLET | Refills: 0 | Status: SHIPPED | OUTPATIENT
Start: 2019-03-13 | End: 2019-05-02

## 2019-03-13 NOTE — TELEPHONE ENCOUNTER
Prescription approved per [facility] Refill Protocol.    Marjorie Epperson RN

## 2019-03-15 ENCOUNTER — THERAPY VISIT (OUTPATIENT)
Dept: CHIROPRACTIC MEDICINE | Facility: CLINIC | Age: 59
End: 2019-03-15
Payer: COMMERCIAL

## 2019-03-15 DIAGNOSIS — M99.03 SOMATIC DYSFUNCTION OF LUMBAR REGION: ICD-10-CM

## 2019-03-15 DIAGNOSIS — M99.01 CERVICAL SEGMENT DYSFUNCTION: Primary | ICD-10-CM

## 2019-03-15 DIAGNOSIS — M54.2 CERVICALGIA: ICD-10-CM

## 2019-03-15 DIAGNOSIS — M99.02 THORACIC SEGMENT DYSFUNCTION: ICD-10-CM

## 2019-03-15 PROCEDURE — 98941 CHIROPRACT MANJ 3-4 REGIONS: CPT | Mod: AT | Performed by: CHIROPRACTOR

## 2019-03-15 PROCEDURE — 97810 ACUP 1/> WO ESTIM 1ST 15 MIN: CPT | Performed by: CHIROPRACTOR

## 2019-03-15 NOTE — PROGRESS NOTES
Visit #:  2/8    Subjective:  Bharati Villarreal is a 58 year old female who is seen in f/u up for: presenting with neck, back, and headache pain. Patient reports that the onset was 20+ years ago for unknown reasons. Patient reports previous x-rays do show degeneration of the cervical spine. Patient reports the HA's are worse with stress and prolonged computer work.     Data Unavailable.     Since last visit on 3/8/2019,  Bharati Villarreal reports: overall improvement for 1 week after the last treatment. States having a HA today.    Area of chief complaint:  Cervical and Thoracic :  Symptoms are graded at 4/10. The quality is described as stiff, achey, dull.  Motion has increased, but is still not normal, C2, C6, T5,L2. Patient feels that they improved for at least one week.     Since last visit the patient feels that they are 10-20 percent  improved from last visit.       Objective:  The following was observed:    P: palpatory tenderness Sub-occipital and Traps Bilaterally  A: static palpation demonstrates intersegmental asymmetry , cervical, thoracic  R: motion palpation notes restricted motion, C1 , C2 , C6 , T5 , T10 and L2  T: muscle spasm at level(s): Sub-occipital, T-spine paraspinal and Traps Bilaterally    Segmental spinal dysfunction/restrictions found at:  C1 , C2 , C6 , T5  and L2      Assessment:    Diagnoses:    No diagnosis found.    Patient's condition:  Patient had restrictions pre-manipulation    Treatment effectiveness:  Post manipulation there is better intersegmental movement and Patient claims to feel looser post manipulation      Procedures:  CMT:  35836 Chiropractic manipulative treatment 3-4 regions performed   Cervical: Diversified-C5, C6 and ActivatorC0, Occiput, C1 , C6, Supine prone  Thoracic: Diversified, T5, T10, Prone  Lumbar: Diversified, L1, L2, Prone    Modalities:  23704: Acupuncture, for 15 minutes:  Points: for neck pain and HA's patient prone-15 min    Therapeutic  procedures:  None    Response to Treatment  Reduction in symptoms as reported by patient    Prognosis: Good    Progress towards Goals: Patient is making progress towards the goal.     Recommendations:    Instructions:  ice 20 minutes every other hour as needed    Follow-up:    Return to care in 1-2 weeks.

## 2019-04-16 ENCOUNTER — THERAPY VISIT (OUTPATIENT)
Dept: CHIROPRACTIC MEDICINE | Facility: CLINIC | Age: 59
End: 2019-04-16
Payer: COMMERCIAL

## 2019-04-16 DIAGNOSIS — M54.2 CERVICALGIA: ICD-10-CM

## 2019-04-16 DIAGNOSIS — M99.03 SOMATIC DYSFUNCTION OF LUMBAR REGION: ICD-10-CM

## 2019-04-16 DIAGNOSIS — M99.02 THORACIC SEGMENT DYSFUNCTION: ICD-10-CM

## 2019-04-16 DIAGNOSIS — M99.01 CERVICAL SEGMENT DYSFUNCTION: Primary | ICD-10-CM

## 2019-04-16 PROCEDURE — 98941 CHIROPRACT MANJ 3-4 REGIONS: CPT | Mod: AT | Performed by: CHIROPRACTOR

## 2019-04-16 PROCEDURE — 97810 ACUP 1/> WO ESTIM 1ST 15 MIN: CPT | Performed by: CHIROPRACTOR

## 2019-04-16 NOTE — PROGRESS NOTES
Visit #:  3/8    Subjective:  Bharati Villarreal is a 58 year old female who is seen in f/u up for: presenting with neck, back, and headache pain. Patient reports that the onset was 20+ years ago for unknown reasons. Patient reports previous x-rays do show degeneration of the cervical spine. Patient reports the HA's are worse with stress and prolonged computer work.     Data Unavailable.     Since last visit on 3/15/19,  Bharati Villarreal reports: overall improvement for 1 week after the last treatment. States having a HA today.    Area of chief complaint:  Cervical and Thoracic :  Symptoms are graded at 4/10. The quality is described as stiff, achey, dull.  Motion has increased, but is still not normal, C2, C6, T5,L2. Patient feels that they improved for at least one week.     Since last visit the patient feels that they are 10-20 percent  improved from last visit.       Objective:  The following was observed:    P: palpatory tenderness Sub-occipital and Traps Bilaterally  A: static palpation demonstrates intersegmental asymmetry , cervical, thoracic  R: motion palpation notes restricted motion, C1 , C2 , C6 , T5 , T10 and L2  T: muscle spasm at level(s): Sub-occipital, T-spine paraspinal and Traps Bilaterally    Segmental spinal dysfunction/restrictions found at:  C1 , C2 , C6 , T5  and L2      Assessment:    Diagnoses:    No diagnosis found.    Patient's condition:  Patient had restrictions pre-manipulation    Treatment effectiveness:  Post manipulation there is better intersegmental movement and Patient claims to feel looser post manipulation      Procedures:  CMT:  45216 Chiropractic manipulative treatment 3-4 regions performed   Cervical: Diversified-C5, C6 and ActivatorC0, Occiput, C1 , C6, Supine prone  Thoracic: Diversified, T5, T10, Prone  Lumbar: Diversified, L1, L2, Prone    Modalities:  97635: Acupuncture, for 15 minutes:  Points: for neck pain and HA's patient prone-15 min    Therapeutic  procedures:  None    Response to Treatment  Reduction in symptoms as reported by patient    Prognosis: Good    Progress towards Goals: Patient is making progress towards the goal.     Recommendations:    Instructions:  ice 20 minutes every other hour as needed    Follow-up:    Return to care in 1-2 weeks.

## 2019-05-02 ENCOUNTER — OFFICE VISIT (OUTPATIENT)
Dept: PEDIATRICS | Facility: CLINIC | Age: 59
End: 2019-05-02
Payer: COMMERCIAL

## 2019-05-02 VITALS
RESPIRATION RATE: 16 BRPM | HEIGHT: 64 IN | HEART RATE: 98 BPM | DIASTOLIC BLOOD PRESSURE: 68 MMHG | TEMPERATURE: 98.1 F | WEIGHT: 131.4 LBS | OXYGEN SATURATION: 98 % | BODY MASS INDEX: 22.43 KG/M2 | SYSTOLIC BLOOD PRESSURE: 104 MMHG

## 2019-05-02 DIAGNOSIS — Z13.89 SCREENING FOR DIABETIC PERIPHERAL NEUROPATHY: ICD-10-CM

## 2019-05-02 DIAGNOSIS — B35.9 RINGWORM: ICD-10-CM

## 2019-05-02 DIAGNOSIS — E11.9 TYPE 2 DIABETES MELLITUS WITHOUT COMPLICATION, WITHOUT LONG-TERM CURRENT USE OF INSULIN (H): Primary | ICD-10-CM

## 2019-05-02 LAB — HBA1C MFR BLD: 8.2 % (ref 0–5.6)

## 2019-05-02 PROCEDURE — 83036 HEMOGLOBIN GLYCOSYLATED A1C: CPT | Performed by: INTERNAL MEDICINE

## 2019-05-02 PROCEDURE — 99214 OFFICE O/P EST MOD 30 MIN: CPT | Performed by: INTERNAL MEDICINE

## 2019-05-02 PROCEDURE — 36415 COLL VENOUS BLD VENIPUNCTURE: CPT | Performed by: INTERNAL MEDICINE

## 2019-05-02 PROCEDURE — 99207 C FOOT EXAM  NO CHARGE: CPT | Performed by: INTERNAL MEDICINE

## 2019-05-02 RX ORDER — FLUCONAZOLE 150 MG/1
150 TABLET ORAL WEEKLY
Qty: 6 TABLET | Refills: 0 | Status: SHIPPED | OUTPATIENT
Start: 2019-05-02 | End: 2019-09-18

## 2019-05-02 ASSESSMENT — MIFFLIN-ST. JEOR: SCORE: 1175

## 2019-05-02 NOTE — PATIENT INSTRUCTIONS
Continue Invokana and metformin. Start Januvia once daily. Let me know if you have side effects with this!!    Take fluconazole 150mg once per week for 4-6 weeks for ringworm.     Follow-up with me in 3 months.

## 2019-05-02 NOTE — PROGRESS NOTES
"  SUBJECTIVE:   Bharati Villarreal is a 58 year old female who presents to clinic today for the following health issues:    Diabetes Follow-up      Patient is checking blood sugars: Checks about once a week around 5 pm before dinner. Numbers have been 170-230.    Diabetic concerns: blood sugar frequently over 200     Symptoms of hypoglycemia (low blood sugar): none     Paresthesias (numbness or burning in feet) or sores: Yes Tingling in Both Feet     Date of last diabetic eye exam: June of 2018    BP Readings from Last 2 Encounters:   02/12/19 110/62   01/21/19 116/70     Hemoglobin A1C (%)   Date Value   11/28/2018 7.3 (H)   09/12/2018 8.4 (H)     LDL Cholesterol Calculated (mg/dL)   Date Value   11/28/2018 34   06/26/2017 20       Diabetes Management Resources    Hyperlipidemia Follow-Up      Rate your low fat/cholesterol diet?: not monitoring fat    Taking statin?  No    Other lipid medications/supplements?:  none      Amount of exercise or physical activity: \"Somewhat\"    Problems taking medications regularly: No    Medication side effects: none    Diet: regular (no restrictions)    1. Diabetes: Bharati reports that she stopped Bydureon a couple of months ago due to side effects of nausea/vomiting and abdominal discomfort. Hasn't been checking her blood sugars. Still taking metformin and Invokana without issues. Pretty good with her eating habits, did gain about 5 pounds. Drinks one soda per day.     2. Rash: started on her R buttock and has now had multiple lesions over her buttock and right leg. Improve with topical ketoconazole but she keeps getting more lesions.       Additional history: as documented    Reviewed  and updated as needed this visit by clinical staff  Tobacco  Allergies  Meds  Med Hx  Surg Hx  Fam Hx  Soc Hx        Patient Active Problem List   Diagnosis     Insomnia     GERD (gastroesophageal reflux disease)     Hyperlipidemia with target LDL less than 100     Anxiety     Psoriasis     Tobacco " "abuse     Moderate episode of recurrent major depressive disorder (H)     Migraine with aura and without status migrainosus, not intractable     Type 2 diabetes mellitus without complication, without long-term current use of insulin (H)     Past Surgical History:   Procedure Laterality Date     ARTHROSCOPY SHOULDER RT/LT      forzen should repair RIGHT     AS ABLATION, ENDOMETRIAL, THERMAL, W/O HYSTEROSCOPIC GUIDANCE       OOPHORECTOMY         Social History     Tobacco Use     Smoking status: Current Every Day Smoker     Packs/day: 0.00     Years: 35.00     Pack years: 0.00     Types: Cigarettes     Smokeless tobacco: Never Used     Tobacco comment: 1/2 PPD - started at age 14   Substance Use Topics     Alcohol use: No     Alcohol/week: 0.0 oz     Family History   Problem Relation Age of Onset     Diabetes Other      Cancer - colorectal Father 72         of throat cancer  smoker      Cancer Father      Other Cancer Father      Family History Negative Mother      Cancer Maternal Grandmother      Colon Cancer Paternal Grandmother            ROS:  Constitutional, endo, derm, MSK systems are negative, except as otherwise noted.    OBJECTIVE:     /68 (BP Location: Right arm, Patient Position: Chair, Cuff Size: Adult Regular)   Pulse 98   Temp 98.1  F (36.7  C) (Oral)   Resp 16   Ht 1.632 m (5' 4.25\")   Wt 59.6 kg (131 lb 6.4 oz)   LMP  (LMP Unknown)   SpO2 98%   BMI 22.38 kg/m    Body mass index is 22.38 kg/m .  GENERAL: healthy, alert and no distress  SKIN: well demarcated round erythematous 2cm lesion with overlying scale on R thigh, similar lesion on buttock that appears to be resolving.   Diabetic foot exam: normal DP and PT pulses, no trophic changes or ulcerative lesions, normal sensory exam and normal monofilament exam    Diagnostic Test Results:  Results for orders placed or performed in visit on 19 (from the past 24 hour(s))   HEMOGLOBIN A1C   Result Value Ref Range    Hemoglobin A1C 8.2 " (H) 0 - 5.6 %       ASSESSMENT/PLAN:     1. Type 2 diabetes mellitus without complication, without long-term current use of insulin (H)  Recently worsening since stopping Bydureon. Will try adding in DDP-4 inhibitor, but discussed that this may be expensive and she may have similar side effects as with Bydureon. If she cannot tolerate this or cost is prohibative would consider glipizide as appears she has not previously been on this.   - HEMOGLOBIN A1C  - sitagliptin (JANUVIA) 100 MG tablet; Take 1 tablet (100 mg) by mouth daily  Dispense: 90 tablet; Refill: 3  - canagliflozin (INVOKANA) 300 MG tablet; Take 1 tablet (300 mg) by mouth every morning (before breakfast)  Dispense: 90 tablet; Refill: 1    2. Ringworm  Continues to get new lesions with topical antifungal. Will try oral antifungal agent.   - fluconazole (DIFLUCAN) 150 MG tablet; Take 1 tablet (150 mg) by mouth once a week  Dispense: 6 tablet; Refill: 0    3. Screening for diabetic peripheral neuropathy  - FOOT EXAM  NO CHARGE [11196.095]    Patient Instructions   Continue Invokana and metformin. Start Januvia once daily. Let me know if you have side effects with this!!    Take fluconazole 150mg once per week for 4-6 weeks for ringworm.     Follow-up with me in 3 months.       Opal Dietz MD  [facility]

## 2019-05-02 NOTE — Clinical Note
Please abstract the following data from this visit with this patient into the appropriate field in Epic:Eye exam with ophthalmology on this date: June of 2018 done at [facility]. States her Diabetic Eye Exam was Unremarkable.Kenneth Duggan CMA ([facility])

## 2019-05-28 ENCOUNTER — THERAPY VISIT (OUTPATIENT)
Dept: CHIROPRACTIC MEDICINE | Facility: CLINIC | Age: 59
End: 2019-05-28
Payer: COMMERCIAL

## 2019-05-28 DIAGNOSIS — M99.02 THORACIC SEGMENT DYSFUNCTION: ICD-10-CM

## 2019-05-28 DIAGNOSIS — M99.01 CERVICAL SEGMENT DYSFUNCTION: Primary | ICD-10-CM

## 2019-05-28 DIAGNOSIS — M99.03 SOMATIC DYSFUNCTION OF LUMBAR REGION: ICD-10-CM

## 2019-05-28 DIAGNOSIS — M54.2 CERVICALGIA: ICD-10-CM

## 2019-05-28 PROCEDURE — 98941 CHIROPRACT MANJ 3-4 REGIONS: CPT | Mod: AT | Performed by: CHIROPRACTOR

## 2019-05-28 PROCEDURE — 97810 ACUP 1/> WO ESTIM 1ST 15 MIN: CPT | Mod: GA | Performed by: CHIROPRACTOR

## 2019-05-28 NOTE — PROGRESS NOTES
Visit #:  4/8    Subjective:  Bharati Villarreal is a 59 year old female who is seen in f/u up for: presenting with neck, back, and headache pain. Patient reports that the onset was 20+ years ago for unknown reasons. Patient reports previous x-rays do show degeneration of the cervical spine. Patient reports the HA's are worse with stress and prolonged computer work.     Data Unavailable.     Since last visit on 4/16/19  Bharati Villarreal reports: overall improvement. States having very little HA's over the last month. Neck is sore/achy today from yard work this past weekend.     Area of chief complaint:  Cervical and Thoracic :  Symptoms are graded at 3/10. The quality is described as stiff, achey, dull.  Motion has increased, but is still not normal, C2, C6, T5,L2. Patient feels that they improved this past month.     Since last visit the patient feels that they are 60 percent  improved from last visit.       Objective:  The following was observed:    P: palpatory tenderness Sub-occipital and Traps Bilaterally  A: static palpation demonstrates intersegmental asymmetry , cervical, thoracic  R: motion palpation notes restricted motion, C1 , C2 , C6 , T5 , T10 and L2  T: muscle spasm at level(s): Sub-occipital, T-spine paraspinal and Traps Bilaterally    Segmental spinal dysfunction/restrictions found at:  C1 , C2 , C6 , T5  and L2      Assessment:    Diagnoses:    No diagnosis found.    Patient's condition:  Patient had restrictions pre-manipulation    Treatment effectiveness:  Post manipulation there is better intersegmental movement and Patient claims to feel looser post manipulation      Procedures:  CMT:  72865 Chiropractic manipulative treatment 3-4 regions performed   Cervical: Diversified-C5, C6 and ActivatorC0, Occiput, C1 , C6, Supine prone  Thoracic: Diversified, T5, T10, Prone  Lumbar: Diversified, L1, L2, Prone    Modalities:  86994: Acupuncture, for 15 minutes:  Points: for neck pain and HA's patient prone-15  min    Therapeutic procedures:  None    Response to Treatment  Reduction in symptoms as reported by patient    Prognosis: Good    Progress towards Goals: Patient is making progress towards the goal.     Recommendations:    Instructions:  ice 20 minutes every other hour as needed    Follow-up:    Return to care in 1-2 weeks.

## 2019-06-04 DIAGNOSIS — E11.9 TYPE 2 DIABETES MELLITUS WITHOUT COMPLICATION, WITHOUT LONG-TERM CURRENT USE OF INSULIN (H): ICD-10-CM

## 2019-06-05 RX ORDER — CANAGLIFLOZIN 300 MG/1
TABLET, FILM COATED ORAL
Qty: 90 TABLET | Refills: 0 | OUTPATIENT
Start: 2019-06-05

## 2019-06-05 NOTE — TELEPHONE ENCOUNTER
Not due for a refills.     canagliflozin (INVOKANA) 300 MG tablet was filled on 5/2/2019, qty 90 with 1 refills.     Denied  Nanci Salazar RN

## 2019-06-06 DIAGNOSIS — G43.109 MIGRAINE WITH AURA AND WITHOUT STATUS MIGRAINOSUS, NOT INTRACTABLE: ICD-10-CM

## 2019-06-06 RX ORDER — NAPROXEN 500 MG/1
500 TABLET ORAL 2 TIMES DAILY WITH MEALS
Qty: 60 TABLET | Refills: 1 | Status: SHIPPED | OUTPATIENT
Start: 2019-06-06 | End: 2019-07-30

## 2019-06-06 NOTE — TELEPHONE ENCOUNTER
"Requested Prescriptions   Pending Prescriptions Disp Refills     naproxen (NAPROSYN) 500 MG tablet    Last Written Prescription Date:  2/12/2019  Last Fill Quantity: 60,  # refills: 1   Last office visit: 5/2/2019 with prescribing provider:  Opal Freedman     Future Office Visit:     60 tablet 1     Sig: Take 1 tablet (500 mg) by mouth 2 times daily (with meals) x2wks scheduled the twice daily as needed       NSAID Medications Failed - 6/6/2019  9:33 AM        Failed - Normal ALT on file in past 12 months     Recent Labs   Lab Test 06/23/16  0747   ALT 19             Failed - Normal AST on file in past 12 months     Recent Labs   Lab Test 06/23/16  0747   AST 10             Failed - Normal CBC on file in past 12 months     Recent Labs   Lab Test 08/12/15  0827 05/04/15  1534   WBC  --  8.4   RBC  --  4.84   HGB 14.1 14.0   HCT  --  41.6   PLT  --  276                 Failed - Medication is active on med list        Passed - Blood pressure under 140/90 in past 12 months     BP Readings from Last 3 Encounters:   05/02/19 104/68   02/12/19 110/62   01/21/19 116/70                 Passed - Recent (12 mo) or future (30 days) visit within the authorizing provider's specialty     Patient had office visit in the last 12 months or has a visit in the next 30 days with authorizing provider or within the authorizing provider's specialty.  See \"Patient Info\" tab in inbasket, or \"Choose Columns\" in Meds & Orders section of the refill encounter.              Passed - Patient is age 6-64 years        Passed - No active pregnancy on record        Passed - Normal serum creatinine on file in past 12 months     Recent Labs   Lab Test 10/11/18  1315   CR 0.72             Passed - No positive pregnancy test in past 12 months          "

## 2019-07-03 DIAGNOSIS — E11.9 TYPE 2 DIABETES MELLITUS WITHOUT COMPLICATION, WITHOUT LONG-TERM CURRENT USE OF INSULIN (H): ICD-10-CM

## 2019-07-04 NOTE — TELEPHONE ENCOUNTER
Requested Prescriptions   Pending Prescriptions Disp Refills     metFORMIN (GLUCOPHAGE) 500 MG tablet  Last Written Prescription Date:  09/15/2018  Last Fill Quantity: 360 tablet,  # refills: 3   Last Office Visit: 5/2/2019    Opal Freedman MD     Future Office Visit:      360 tablet 3     Sig: TAKE 2 TABLETS(1000 MG) BY MOUTH TWICE DAILY WITH MEALS       Biguanide Agents Passed - 7/3/2019  7:40 PM        Passed - Blood pressure less than 140/90 in past 6 months     BP Readings from Last 3 Encounters:   05/02/19 104/68   02/12/19 110/62   01/21/19 116/70                 Passed - Patient has documented LDL within the past 12 mos.     Recent Labs   Lab Test 11/28/18  0906   LDL 34             Passed - Patient has had a Microalbumin in the past 15 mos.     Recent Labs   Lab Test 10/11/18  1315  08/08/13   MICROALB  --   --  15.6   MICROL 7   < >  --    UMALCR 17.57   < > 7.6    < > = values in this interval not displayed.             Passed - Patient is age 10 or older        Passed - Patient has documented A1c within the specified period of time.     If HgbA1C is 8 or greater, it needs to be on file within the past 3 months.  If less than 8, must be on file within the past 6 months.     Recent Labs   Lab Test 05/02/19  1354   A1C 8.2*             Passed - Patient's CR is NOT>1.4 OR Patient's EGFR is NOT<45 within past 12 mos.     Recent Labs   Lab Test 10/11/18  1315   GFRESTIMATED 84   GFRESTBLACK >90       Recent Labs   Lab Test 10/11/18  1315   CR 0.72             Passed - Patient does NOT have a diagnosis of CHF.        Passed - Medication is active on med list        Passed - Patient is not pregnant        Passed - Patient has not had a positive pregnancy test within the past 12 mos.         Passed - Recent (6 mo) or future (30 days) visit within the authorizing provider's specialty     Patient had office visit in the last 6 months or has a visit in the next 30 days with authorizing provider or within the  "authorizing provider's specialty.  See \"Patient Info\" tab in inbasket, or \"Choose Columns\" in Meds & Orders section of the refill encounter.              "

## 2019-07-05 NOTE — TELEPHONE ENCOUNTER
Prescription approved per [facility] Refill Protocol.  Zhanna Nielson RN

## 2019-07-19 ENCOUNTER — TELEPHONE (OUTPATIENT)
Dept: PEDIATRICS | Facility: CLINIC | Age: 59
End: 2019-07-19

## 2019-07-19 DIAGNOSIS — F33.1 MODERATE EPISODE OF RECURRENT MAJOR DEPRESSIVE DISORDER (H): Primary | ICD-10-CM

## 2019-07-19 RX ORDER — LAMOTRIGINE 200 MG/1
200 TABLET ORAL AT BEDTIME
Qty: 90 TABLET | Refills: 0 | Status: SHIPPED | OUTPATIENT
Start: 2019-07-19 | End: 2019-09-18

## 2019-07-19 NOTE — TELEPHONE ENCOUNTER
Verified medication, dose and directions with pharmacy:   Lamotrigine (Lamictal) 200 mg one tab at bedtime.    Rx teed-up-    Marjorie Epperson RN - Triage  [facility]

## 2019-07-19 NOTE — TELEPHONE ENCOUNTER
Prescription sent, please notify patient. She should look to establish care with a new psychiatrist to take over her medications.    Opal Freedman MD  Internal Medicine-Pediatrics

## 2019-07-19 NOTE — TELEPHONE ENCOUNTER
Reason for call:  Other   Patient called regarding (reason for call): call back  Additional comments: Please call patient back regarding her medication that keeps on getting denied. The medication is lamotrigine, 200mg. She wants to talk to the nurse.    Phone number to reach patient:  Home number on file [phone] (home)    Best Time:  ASAP    Can we leave a detailed message on this number?  YES

## 2019-07-19 NOTE — TELEPHONE ENCOUNTER
Pt requesting rx today, she will be out of medication today.    Pt requesting new rx for Lamictal 200 mg one tab daily, from .  She previoiusly was receiving this rx from her psychiatrist, , who is no longer with the practice.  Pt states that her psychiatrist informed her that she could have her primary provider take this over.    Please advise-    Marjorie Epperson RN - Triage  [facility]

## 2019-07-19 NOTE — TELEPHONE ENCOUNTER
Can we call her pharmacy and confirm that this is the dose she is taking? I am fine refilling this in the short term until she can re-establish with a new psychiatrist, but want to confirm her dose.    Opal Freedman MD  Internal Medicine-Pediatrics

## 2019-07-30 DIAGNOSIS — G43.109 MIGRAINE WITH AURA AND WITHOUT STATUS MIGRAINOSUS, NOT INTRACTABLE: ICD-10-CM

## 2019-07-30 NOTE — TELEPHONE ENCOUNTER
"Requested Prescriptions   Pending Prescriptions Disp Refills     naproxen (NAPROSYN) 500 MG tablet [Pharmacy Med Name: NAPROXEN 500MG TABLETS]    Last Written Prescription Date:  6/6/2019  Last Fill Quantity: 60,  # refills: 1   Last office visit: 5/2/2019 with prescribing provider:  Opal Freedman     Future Office Visit:     60 tablet 0     Sig: TAKE 1 TABLET BY MOUTH TWICE DAILY( WITH MEALS) FOR 2 WEEKS SCHEDULED THEN TWICE DAILY AS NEEDED       NSAID Medications Failed - 7/30/2019  5:10 AM        Failed - Normal ALT on file in past 12 months     Recent Labs   Lab Test 06/23/16  0747   ALT 19             Failed - Normal AST on file in past 12 months     Recent Labs   Lab Test 06/23/16  0747   AST 10           Failed - Normal CBC on file in past 12 months     Recent Labs   Lab Test 08/12/15  0827 05/04/15  1534   WBC  --  8.4   RBC  --  4.84   HGB 14.1 14.0   HCT  --  41.6   PLT  --  276           Passed - Blood pressure under 140/90 in past 12 months     BP Readings from Last 3 Encounters:   05/02/19 104/68   02/12/19 110/62   01/21/19 116/70                 Passed - Recent (12 mo) or future (30 days) visit within the authorizing provider's specialty     Patient had office visit in the last 12 months or has a visit in the next 30 days with authorizing provider or within the authorizing provider's specialty.  See \"Patient Info\" tab in inbasket, or \"Choose Columns\" in Meds & Orders section of the refill encounter.              Passed - Patient is age 6-64 years        Passed - Medication is active on med list        Passed - No active pregnancy on record        Passed - Normal serum creatinine on file in past 12 months     Recent Labs   Lab Test 10/11/18  1315   CR 0.72             Passed - No positive pregnancy test in past 12 months      Routing refill request to provider for review/approval because:  Labs not current:  AST, ALT, CBC    Nanci Salazar RN            "

## 2019-07-31 RX ORDER — NAPROXEN 500 MG/1
TABLET ORAL
Qty: 60 TABLET | Refills: 0 | Status: SHIPPED | OUTPATIENT
Start: 2019-07-31 | End: 2019-08-29

## 2019-09-18 ENCOUNTER — OFFICE VISIT (OUTPATIENT)
Dept: PEDIATRICS | Facility: CLINIC | Age: 59
End: 2019-09-18
Payer: COMMERCIAL

## 2019-09-18 VITALS
HEIGHT: 64 IN | OXYGEN SATURATION: 98 % | DIASTOLIC BLOOD PRESSURE: 78 MMHG | HEART RATE: 99 BPM | WEIGHT: 133 LBS | BODY MASS INDEX: 22.71 KG/M2 | SYSTOLIC BLOOD PRESSURE: 104 MMHG

## 2019-09-18 DIAGNOSIS — E11.9 TYPE 2 DIABETES MELLITUS WITHOUT COMPLICATION, WITHOUT LONG-TERM CURRENT USE OF INSULIN (H): Primary | ICD-10-CM

## 2019-09-18 DIAGNOSIS — F33.1 MODERATE EPISODE OF RECURRENT MAJOR DEPRESSIVE DISORDER (H): ICD-10-CM

## 2019-09-18 DIAGNOSIS — Z23 NEED FOR PROPHYLACTIC VACCINATION AND INOCULATION AGAINST INFLUENZA: ICD-10-CM

## 2019-09-18 LAB — HBA1C MFR BLD: 8.1 % (ref 0–5.6)

## 2019-09-18 PROCEDURE — 80048 BASIC METABOLIC PNL TOTAL CA: CPT | Performed by: INTERNAL MEDICINE

## 2019-09-18 PROCEDURE — 36415 COLL VENOUS BLD VENIPUNCTURE: CPT | Performed by: INTERNAL MEDICINE

## 2019-09-18 PROCEDURE — 90471 IMMUNIZATION ADMIN: CPT | Performed by: STUDENT IN AN ORGANIZED HEALTH CARE EDUCATION/TRAINING PROGRAM

## 2019-09-18 PROCEDURE — 83036 HEMOGLOBIN GLYCOSYLATED A1C: CPT | Performed by: INTERNAL MEDICINE

## 2019-09-18 PROCEDURE — 99213 OFFICE O/P EST LOW 20 MIN: CPT | Mod: GE | Performed by: STUDENT IN AN ORGANIZED HEALTH CARE EDUCATION/TRAINING PROGRAM

## 2019-09-18 PROCEDURE — 82043 UR ALBUMIN QUANTITATIVE: CPT | Performed by: INTERNAL MEDICINE

## 2019-09-18 PROCEDURE — 90682 RIV4 VACC RECOMBINANT DNA IM: CPT | Performed by: STUDENT IN AN ORGANIZED HEALTH CARE EDUCATION/TRAINING PROGRAM

## 2019-09-18 RX ORDER — LAMOTRIGINE 100 MG/1
150 TABLET ORAL DAILY
Qty: 21 TABLET | Refills: 0 | Status: CANCELLED | OUTPATIENT
Start: 2019-09-18

## 2019-09-18 RX ORDER — MIRTAZAPINE 30 MG/1
30 TABLET, FILM COATED ORAL AT BEDTIME
Qty: 30 TABLET | Refills: 1 | Status: SHIPPED | OUTPATIENT
Start: 2019-09-18 | End: 2019-09-30

## 2019-09-18 RX ORDER — LAMOTRIGINE 150 MG/1
150 TABLET ORAL AT BEDTIME
Qty: 30 TABLET | Refills: 1 | Status: SHIPPED | OUTPATIENT
Start: 2019-09-18 | End: 2019-09-30

## 2019-09-18 RX ORDER — VENLAFAXINE HYDROCHLORIDE 150 MG/1
CAPSULE, EXTENDED RELEASE ORAL
Qty: 30 CAPSULE | Refills: 1 | Status: SHIPPED | OUTPATIENT
Start: 2019-09-18

## 2019-09-18 RX ORDER — VENLAFAXINE HYDROCHLORIDE 75 MG/1
CAPSULE, EXTENDED RELEASE ORAL
Qty: 30 CAPSULE | Refills: 1 | Status: SHIPPED | OUTPATIENT
Start: 2019-09-18

## 2019-09-18 ASSESSMENT — MIFFLIN-ST. JEOR: SCORE: 1177.25

## 2019-09-18 NOTE — PROGRESS NOTES
"Subjective   Bharati Villarreal is a 59 year old female who presents to clinic today for the following health issues:    History of Present Illness        Migraines:   Since the patient's last clinic visit, headaches are: improved  The patient is getting headaches:  2 times amonth  She is not able to do normal daily activities when she has a migraine.  The patient is taking the following rescue/relief medications:  Sumatriptan (Imitrex)   Patient states \"I get some relief\" from the rescue/relief medications.   The patient is taking the following medications to prevent migraines:  No medications to prevent migraines  In the past 4 weeks, the patient has gone to an Urgent Care or Emergency Room 0 times times due to headaches.    She eats 0-1 servings of fruits and vegetables daily.She consumes 1 sweetened beverage(s) daily.  She is taking medications regularly.     Diabetes Follow-up      How often are you checking your blood sugar? A few times a month    What time of day are you checking your blood sugars (select all that apply)?  Before meals    Have you had any blood sugars above 200?  No    Have you had any blood sugars below 70?  No    What symptoms do you notice when your blood sugar is low?  Dizzy    What concerns do you have today about your diabetes? None     Do you have any of these symptoms? (Select all that apply)  Numbness in feet, Excessive thirst and Weight loss     Have you had a diabetic eye exam in the last 12 months? November 2018    BP Readings from Last 2 Encounters:   09/18/19 104/78   05/02/19 104/68     Hemoglobin A1C (%)   Date Value   05/02/2019 8.2 (H)   11/28/2018 7.3 (H)     LDL Cholesterol Calculated (mg/dL)   Date Value   11/28/2018 34   06/26/2017 20       Diabetes Management Resources      How many servings of fruits and vegetables do you eat daily?  0-1    On average, how many sweetened beverages do you drink each day (soda, juice, sweet tea, etc)?   1    How many days per week do you miss " taking your medication? 0    #diabetes  Currently on Januvia, Invokana and metformin. Has not had any side effects.   Checks 1/week glucose check 150s-160. Have been better than before. Checks around 5 PM. Discussed how she should transition or try to check at least 1x daily.  Diet: Have been drinking minute maid diet (2 carbs). Enjoys carbs, however. She has successfully eliminated soda. Drinking lemonade and ice tea, no sugar version. No new numbness or tingling. Weight has been stable.     Will see eye doctor in 12/2019, last saw in December. Is agreeable to getting flu shot today. Did receive foot exam at last visit 5/2019    #psych  Psych medications - has needed some refills. [facility] -- she has been going there, recently her primary psychiatrist moved to [facility] (DR. Snyder). She does not need a new referral for psych but needs some medications until she sees a new psychiatrist.     Vitals:    09/18/19 1124   Weight: 60.3 kg (133 lb)     Wt Readings from Last 4 Encounters:   09/18/19 60.3 kg (133 lb)   05/02/19 59.6 kg (131 lb 6.4 oz)   02/12/19 57.7 kg (127 lb 1.6 oz)   11/28/18 58.1 kg (128 lb 1 oz)     Patient Active Problem List   Diagnosis     Insomnia     GERD (gastroesophageal reflux disease)     Hyperlipidemia with target LDL less than 100     Anxiety     Psoriasis     Tobacco abuse     Moderate episode of recurrent major depressive disorder (H)     Migraine with aura and without status migrainosus, not intractable     Type 2 diabetes mellitus without complication, without long-term current use of insulin (H)     Past Surgical History:   Procedure Laterality Date     ARTHROSCOPY SHOULDER RT/LT      forzen should repair RIGHT     AS ABLATION, ENDOMETRIAL, THERMAL, W/O HYSTEROSCOPIC GUIDANCE       OOPHORECTOMY         Social History     Tobacco Use     Smoking status: Current Every Day Smoker     Packs/day: 0.00     Years: 35.00     Pack years: 0.00     Types: Cigarettes     Smokeless tobacco:  Never Used     Tobacco comment: 1/2 PPD - started at age 14   Substance Use Topics     Alcohol use: No     Alcohol/week: 0.0 oz     Family History   Problem Relation Age of Onset     Diabetes Other      Cancer - colorectal Father 72         of throat cancer  smoker      Cancer Father      Other Cancer Father      Family History Negative Mother      Cancer Maternal Grandmother      Colon Cancer Paternal Grandmother          Current Outpatient Medications   Medication Sig Dispense Refill     ACE/ARB NOT PRESCRIBED, INTENTIONAL, Please choose reason not prescribed, below       atorvastatin (LIPITOR) 20 MG tablet TAKE 1 TABLET BY MOUTH DAILY 90 tablet 3     blood glucose monitoring ([employer] CONTOUR MONITOR) meter device kit Use to test blood sugars 1 times daily or as directed. 1 kit 0     blood glucose monitoring (NO BRAND SPECIFIED) test strip Use to test blood sugar 3  times daily or as directed.   Use to test 1 strip by in vitro route three times daily 2 Box 11     canagliflozin (INVOKANA) 300 MG tablet Take 1 tablet (300 mg) by mouth every morning (before breakfast) 90 tablet 1     fluticasone (FLONASE) 50 MCG/ACT spray Spray 1-2 sprays into both nostrils daily 1 Bottle 11     glucose blood VI test strips strip 1 strip by In Vitro route 3 times daily 100 strip 3     IBUPROFEN PM for sleep       ketoconazole (NIZORAL) 2 % external cream Apply topically daily       lamoTRIgine (LAMICTAL) 150 MG tablet Take 1 tablet (150 mg) by mouth At Bedtime 30 tablet 1     metFORMIN (GLUCOPHAGE) 500 MG tablet TAKE 2 TABLETS(1000 MG) BY MOUTH TWICE DAILY WITH MEALS 360 tablet 0     mirtazapine (REMERON) 30 MG tablet Take 1 tablet (30 mg) by mouth At Bedtime 30 tablet 1     naproxen (NAPROSYN) 500 MG tablet Take 1 tablet (500 mg) by mouth 2 times daily as needed for moderate pain 60 tablet 0     omeprazole (PRILOSEC) 20 MG DR capsule TAKE 1 CAPSULE(20 MG) BY MOUTH DAILY 90 capsule 1     order for DME Equipment being ordered:  "standing desk 1 each 0     sitagliptin (JANUVIA) 100 MG tablet Take 1 tablet (100 mg) by mouth daily 90 tablet 3     SUMAtriptan (IMITREX) 100 MG tablet Take 1 tablet (100 mg) by mouth at onset of headache for migraine 18 tablet 3     venlafaxine (EFFEXOR-XR) 150 MG 24 hr capsule TK 1 C PO D WITH 75 MG CAPSULE 30 capsule 1     venlafaxine (EFFEXOR-XR) 75 MG 24 hr capsule TK ONE C PO QAM WITH 150MG CAPSULE 30 capsule 1     lamoTRIgine (LAMICTAL) 100 MG tablet Take 1.5 tablets (150 mg) by mouth daily Take 1/2 tablet daily for 2 weeks, then 1/2 tablet twice daily 21 tablet 0     Allergies   Allergen Reactions     Abilify [Aripiprazole] Other (See Comments)     psychosis     Sulfa Drugs Rash       Reviewed and updated as needed this visit by Provider  Meds         Review of Systems   ROS COMP: Constitutional, HEENT, cardiovascular, pulmonary, gi and gu systems are negative, except as otherwise noted.      Objective    /78 (BP Location: Right arm, Patient Position: Sitting)   Pulse 99   Ht 1.632 m (5' 4.25\")   Wt 60.3 kg (133 lb)   LMP  (LMP Unknown)   SpO2 98%   BMI 22.65 kg/m    Body mass index is 22.65 kg/m .  Physical Exam   GENERAL: healthy, alert and no distress  EYES: Eyes grossly normal to inspection, PERRL and conjunctivae and sclerae normal  HENT: ear canals, nose and mouth without ulcers or lesions  NECK: no adenopathy, no asymmetry, masses, or scars   RESP: lungs clear to auscultation - no rales, rhonchi or wheezes  CV: regular rate and rhythm, normal S1 S2, no S3 or S4, no murmur, click or rub, no peripheral edema and peripheral pulses strong  ABDOMEN: soft, nontender, no hepatosplenomegaly, no masses and bowel sounds normal  MS: no gross musculoskeletal defects noted, no edema  SKIN: no suspicious lesions or rashes  NEURO: Normal strength and tone, mentation intact and speech normal  PSYCH: mentation appears normal, affect normal/bright  FOOT: L foot in cast/irene - did not examine. R foot - " normal pinprick exam.    Diagnostic Test Results:  Labs reviewed in Epic    Assessment & Plan     1. Type 2 diabetes mellitus without complication, without long-term current use of insulin (H)  Has not been checking BGs at home regularly. Pt is amenable to checking at least once per day per our discussion. She indicated that she would record her numbers on her phone.  She was not interested in diabetes educator referral today; also did discuss if checking glucoses daily is a burden that there are other options like CGM, so she is aware that this is an option. Has an eye appt 12/2019 scheduled. No other new symptoms that she is reporting from medications or side effects.    - Hemoglobin A1c  - Albumin Random Urine Quantitative with Creat Ratio  - Basic metabolic panel  (Ca, Cl, CO2, Creat, Gluc, K, Na, BUN)  - metFORMIN (GLUCOPHAGE) 500 MG tablet; TAKE 2 TABLETS(1000 MG) BY MOUTH TWICE DAILY WITH MEALS  Dispense: 360 tablet; Refill: 0    2. Moderate episode of recurrent major depressive disorder (H)  Per pt well controlled, no new symptoms. She needs to set up to see a new psychiatrist -she does not need a referral (pt states she will go back to [facility]). Discussed that per Dr. Freedman's notes she needs to follow with a psychiatrist to manage the below medications. Discussed with patient we will give her another short-term refill but she must establish with another psychiatrist long term for these medications. She is agreeable to this plan.   - venlafaxine (EFFEXOR-XR) 150 MG 24 hr capsule; TK 1 C PO D WITH 75 MG CAPSULE  Dispense: 30 capsule; Refill: 1  - venlafaxine (EFFEXOR-XR) 75 MG 24 hr capsule; TK ONE C PO QAM WITH 150MG CAPSULE  Dispense: 30 capsule; Refill: 1  - mirtazapine (REMERON) 30 MG tablet; Take 1 tablet (30 mg) by mouth At Bedtime  Dispense: 30 tablet; Refill: 1  - lamoTRIgine (LAMICTAL) 150 MG tablet; Take 1 tablet (150 mg) by mouth At Bedtime  Dispense: 30 tablet; Refill: 1    3. Need for  prophylactic vaccination and inoculation against influenza  Received as such below.   - INFLUENZA QUAD, RECOMBINANT, P-FREE (RIV4) (FLUBLOCK) [63404]  - Vaccine Administration, Initial [93575]     Tobacco Cessation:   reports that she has been smoking cigarettes.  She has been smoking about 0.00 packs per day for the past 35.00 years. She has never used smokeless tobacco.    Return in about 3 months (around 12/18/2019) for Follow up.    This patient was seen and discussed with Dr. Danna Asher MD  [facility]    ----------    I discussed this case in depth with Dr. Asher. I reviewed and agree with the key components of the history, assessment, and plan. Highly encouraged pt to start checking her blood sugars. No A1c available while pt in clinic- see lab results note. Again recommended establishing with [facility] - she declines mental health referral and has a place she wants to schedule with.     HIEN Clay MD  Internal Medicine-Pediatrics

## 2019-09-19 DIAGNOSIS — E11.9 TYPE 2 DIABETES MELLITUS WITHOUT COMPLICATION, WITHOUT LONG-TERM CURRENT USE OF INSULIN (H): Primary | ICD-10-CM

## 2019-09-19 LAB
ANION GAP SERPL CALCULATED.3IONS-SCNC: 9 MMOL/L (ref 3–14)
BUN SERPL-MCNC: 19 MG/DL (ref 7–30)
CALCIUM SERPL-MCNC: 9.7 MG/DL (ref 8.5–10.1)
CHLORIDE SERPL-SCNC: 106 MMOL/L (ref 94–109)
CO2 SERPL-SCNC: 24 MMOL/L (ref 20–32)
CREAT SERPL-MCNC: 0.71 MG/DL (ref 0.52–1.04)
CREAT UR-MCNC: 44 MG/DL
GFR SERPL CREATININE-BSD FRML MDRD: >90 ML/MIN/{1.73_M2}
GLUCOSE SERPL-MCNC: 209 MG/DL (ref 70–99)
MICROALBUMIN UR-MCNC: 16 MG/L
MICROALBUMIN/CREAT UR: 37.59 MG/G CR (ref 0–25)
POTASSIUM SERPL-SCNC: 4.1 MMOL/L (ref 3.4–5.3)
SODIUM SERPL-SCNC: 139 MMOL/L (ref 133–144)

## 2019-09-28 DIAGNOSIS — E11.9 TYPE 2 DIABETES MELLITUS WITHOUT COMPLICATION, WITHOUT LONG-TERM CURRENT USE OF INSULIN (H): ICD-10-CM

## 2019-09-28 DIAGNOSIS — F33.1 MODERATE EPISODE OF RECURRENT MAJOR DEPRESSIVE DISORDER (H): ICD-10-CM

## 2019-09-28 DIAGNOSIS — K21.9 GASTROESOPHAGEAL REFLUX DISEASE WITHOUT ESOPHAGITIS: ICD-10-CM

## 2019-09-28 DIAGNOSIS — G43.109 MIGRAINE WITH AURA AND WITHOUT STATUS MIGRAINOSUS, NOT INTRACTABLE: ICD-10-CM

## 2019-09-28 DIAGNOSIS — E78.2 MIXED HYPERLIPIDEMIA: ICD-10-CM

## 2019-09-29 NOTE — TELEPHONE ENCOUNTER
"Requested Prescriptions   Pending Prescriptions Disp Refills     atorvastatin (LIPITOR) 20 MG tablet [Pharmacy Med Name: ATORVASTATIN 20MG TABLETS] 90 tablet 0     Sig: TAKE 1 TABLET BY MOUTH DAILY  Last Written Prescription Date:  12/04/2018  Last Fill Quantity: 90 tablet,  # refills: 3   Last office visit: 9/18/2019 with prescribing provider:  Kelley Asher MD   Future Office Visit:   Next 5 appointments (look out 90 days)    Sep 30, 2019  3:45 PM CDT  Pharmacist visit with Augusta Nelson [facility], LORIE EXAM ROOM 40  [facility] ([facility] [address] [place] [address]  [address]  [facility] [address]  [phone]   Dec 24, 2019  7:25 AM CST  Office visit with Opal Freedman MD, LORIE EXAM ROOM 01  [facility] ([facility] [address]  [address]  [facility] [address]  [phone]                Statins Protocol Passed - 9/28/2019  5:11 AM        Passed - LDL on file in past 12 months     Recent Labs   Lab Test 11/28/18  0906   LDL 34             Passed - No abnormal creatine kinase in past 12 months     No lab results found.             Passed - Recent (12 mo) or future (30 days) visit within the authorizing provider's specialty     Patient has had an office visit with the authorizing provider or a provider within the authorizing providers department within the previous 12 mos or has a future within next 30 days. See \"Patient Info\" tab in inbasket, or \"Choose Columns\" in Meds & Orders section of the refill encounter.              Passed - Medication is active on med list        Passed - Patient is age 18 or older        Passed - No active pregnancy on record        Passed - No positive pregnancy test in past 12 months          "

## 2019-09-29 NOTE — TELEPHONE ENCOUNTER
"Requested Prescriptions   Pending Prescriptions Disp Refills     omeprazole (PRILOSEC) 20 MG DR capsule [Pharmacy Med Name: OMEPRAZOLE 20MG CAPSULES] 90 capsule 0     Sig: TAKE 1 CAPSULE(20 MG) BY MOUTH DAILY  Last Written Prescription Date:  03/05/2019  Last Fill Quantity: 90 capsule,  # refills: 1   Last office visit: 9/18/2019 with prescribing provider:  Kelley Asher MD    Future Office Visit:   Next 5 appointments (look out 90 days)    Sep 30, 2019  3:45 PM CDT  Pharmacist visit with Augusta Nelson [facility], LORIE EXAM ROOM 40  [facility] ([facility]) [address]  [address]  [facility] [address]  [phone]   Dec 24, 2019  7:25 AM CST  Office visit with Opal Freedman MD, LORIE EXAM ROOM 01  [facility] ([facility]) [address]  [address]  [facility] [address]  [phone]                PPI Protocol Passed - 9/28/2019  5:11 AM        Passed - Not on Clopidogrel (unless Pantoprazole ordered)        Passed - No diagnosis of osteoporosis on record        Passed - Recent (12 mo) or future (30 days) visit within the authorizing provider's specialty     Patient has had an office visit with the authorizing provider or a provider within the authorizing providers department within the previous 12 mos or has a future within next 30 days. See \"Patient Info\" tab in inbasket, or \"Choose Columns\" in Meds & Orders section of the refill encounter.              Passed - Medication is active on med list        Passed - Patient is age 18 or older        Passed - No active pregnacy on record        Passed - No positive pregnancy test in past 12 months        INVOKANA 300 MG tablet [Pharmacy Med Name: INVOKANA 300MG TABLETS] 90 tablet 0     Sig: TAKE 1 TABLET(300 MG) BY MOUTH EVERY MORNING BEFORE BREAKFAST  Last Written Prescription Date:  05/02/2019  Last Fill Quantity: 90 tablet,  # refills: 1   Last office visit: 9/18/2019 with prescribing " provider:  Kelley Asher MD    Future Office Visit:   Next 5 appointments (look out 90 days)    Sep 30, 2019  3:45 PM CDT  Pharmacist visit with Augusta Nelson, MATTIE, LORIE EXAM ROOM 40  [facility] ([facility]) [address]  [address]  [place] [address]  [phone]   Dec 24, 2019  7:25 AM CST  Office visit with Opal Freedman MD, LORIE EXAM ROOM 01  [facility] ([facility]) [address]  [address]  [place] [address]  [phone]                Sodium Glucose Co-Transport Inhibitor Agents Passed - 9/28/2019  5:11 AM        Passed - Blood pressure less than 140/90 in past 6 months     BP Readings from Last 3 Encounters:   09/18/19 104/78   05/02/19 104/68   02/12/19 110/62                 Passed - Patient has documented LDL within the past 12 mos.     Recent Labs   Lab Test 11/28/18  0906   LDL 34             Passed - Patient has had a Microalbumin in the past 15 mos.     Recent Labs   Lab Test 09/18/19  1227  08/08/13   MICROALB  --   --  15.6   MICROL 16   < >  --    UMALCR 37.59*   < > 7.6    < > = values in this interval not displayed.             Passed - Patient has documented A1c within the specified period of time.     If HgbA1C is 8 or greater, it needs to be on file within the past 3 months.  If less than 8, must be on file within the past 6 months.     Recent Labs   Lab Test 09/18/19  1228   A1C 8.1*             Passed - No creatinine >1.4 or GFR <45 within the past 12 mos     Recent Labs   Lab Test 09/18/19  1228   GFRESTIMATED >90   GFRESTBLACK >90       Recent Labs   Lab Test 09/18/19  1228   CR 0.71             Passed - Medication is active on med list        Passed - Patient is age 18 or older        Passed - Patient is not pregnant        Passed - Patient has documented normal Potassium within the last 12 mos.     Recent Labs   Lab Test 09/18/19  1228   POTASSIUM 4.1             Passed - Patient has no  "positive pregnancy test within the past 12 mos.        Passed - Recent (6 mo) or future (30 days) visit within the authorizing provider's specialty     Patient had office visit in the last 6 months or has a visit in the next 30 days with authorizing provider or within the authorizing provider's specialty.  See \"Patient Info\" tab in inbasket, or \"Choose Columns\" in Meds & Orders section of the refill encounter.            naproxen (NAPROSYN) 500 MG tablet [Pharmacy Med Name: NAPROXEN 500MG TABLETS] 60 tablet 0     Sig: TAKE 1 TABLET(500 MG) BY MOUTH TWICE DAILY AS NEEDED FOR MODERATE PAIN  Last Written Prescription Date:  08/31/2019  Last Fill Quantity: 60 tablet,  # refills: 0   Last office visit: 9/18/2019 with prescribing provider:  Kelley Asher MD    Future Office Visit:   Next 5 appointments (look out 90 days)    Sep 30, 2019  3:45 PM CDT  Pharmacist visit with Augusta Nelson RP, EA EXAM ROOM 40  [facility] ([facility]) [address] [place] [address]  [address]  [facility] [address]  [phone]   Dec 24, 2019  7:25 AM CST  Office visit with Opal Freedman MD, EA EXAM ROOM 01  [facility] ([facility]) [address] [place] [address]  [address]  [facility] [address]  [phone]                NSAID Medications Failed - 9/28/2019  5:11 AM        Failed - Normal ALT on file in past 12 months     Recent Labs   Lab Test 06/23/16  0747   ALT 19             Failed - Normal AST on file in past 12 months     Recent Labs   Lab Test 06/23/16  0747   AST 10             Failed - Normal CBC on file in past 12 months     Recent Labs   Lab Test 08/12/15  0827 05/04/15  1534   WBC  --  8.4   RBC  --  4.84   HGB 14.1 14.0   HCT  --  41.6   PLT  --  276                 Passed - Blood pressure under 140/90 in past 12 months     BP Readings from Last 3 Encounters:   09/18/19 104/78   05/02/19 104/68   02/12/19 110/62                 Passed - Recent (12 mo) or future (30 " "days) visit within the authorizing provider's specialty     Patient has had an office visit with the authorizing provider or a provider within the authorizing providers department within the previous 12 mos or has a future within next 30 days. See \"Patient Info\" tab in inbasket, or \"Choose Columns\" in Meds & Orders section of the refill encounter.              Passed - Patient is age 6-64 years        Passed - Medication is active on med list        Passed - No active pregnancy on record        Passed - Normal serum creatinine on file in past 12 months     Recent Labs   Lab Test 09/18/19  1228   CR 0.71             Passed - No positive pregnancy test in past 12 months            lamoTRIgine (LAMICTAL) 200 MG tablet [Pharmacy Med Name: LAMOTRIGINE 200MG TABLETS] 90 tablet 0     Sig: TAKE 1 TABLET(200 MG) BY MOUTH AT BEDTIME  Last Written Prescription Date:  07/19/2019  Last Fill Quantity: 90 tablet,  # refills: 0   Last office visit: 9/18/2019 with prescribing provider:  Kelley Asher MD    Future Office Visit:   Next 5 appointments (look out 90 days)    Sep 30, 2019  3:45 PM CDT  Pharmacist visit with Augusta Nelson [facility], LORIE EXAM ROOM 40  [facility] ([facility]) [address] [place] [address]  [address]  [facility] [address]  [phone]   Dec 24, 2019  7:25 AM CST  Office visit with Opal Freedman MD, LORIE EXAM ROOM 01  [facility] ([facility]) [address] [place] [address]  [address]  [facility] [address]  [phone]                Anti-Seizure Meds Protocol  Failed - 9/28/2019  5:11 AM        Failed - Review Authorizing provider's last note.      Refer to last progress notes: confirm request is for original authorizing provider (cannot be through other providers).          Failed - Normal CBC on file in past 26 months     Recent Labs   Lab Test 08/12/15  0827 05/04/15  1534   WBC  --  8.4   RBC  --  4.84   HGB 14.1 14.0   HCT  --  41.6   PLT  --  276 " "                Failed - Normal ALT or AST on file in past 26 months     Recent Labs   Lab Test 06/23/16  0747   ALT 19     Recent Labs   Lab Test 06/23/16  0747   AST 10             Failed - Normal platelet count on file in past 26 months     Recent Labs   Lab Test 05/04/15  1534                  Passed - Recent (12 mo) or future (30 days) visit within the authorizing provider's specialty     Patient has had an office visit with the authorizing provider or a provider within the authorizing providers department within the previous 12 mos or has a future within next 30 days. See \"Patient Info\" tab in inbasket, or \"Choose Columns\" in Meds & Orders section of the refill encounter.              Passed - Medication is active on med list        Passed - No active pregnancy on record        Passed - No positive pregnancy test in last 12 months      Routing refill request to provider for review/approval because:  Drug not active on patient's medication list      "

## 2019-09-30 ENCOUNTER — OFFICE VISIT (OUTPATIENT)
Dept: PHARMACY | Facility: CLINIC | Age: 59
End: 2019-09-30
Payer: COMMERCIAL

## 2019-09-30 VITALS — SYSTOLIC BLOOD PRESSURE: 110 MMHG | DIASTOLIC BLOOD PRESSURE: 70 MMHG

## 2019-09-30 DIAGNOSIS — Z72.0 TOBACCO ABUSE: ICD-10-CM

## 2019-09-30 DIAGNOSIS — K21.9 GASTROESOPHAGEAL REFLUX DISEASE WITHOUT ESOPHAGITIS: ICD-10-CM

## 2019-09-30 DIAGNOSIS — E78.5 HYPERLIPIDEMIA WITH TARGET LDL LESS THAN 100: Chronic | ICD-10-CM

## 2019-09-30 DIAGNOSIS — E11.9 TYPE 2 DIABETES MELLITUS WITHOUT COMPLICATION, WITHOUT LONG-TERM CURRENT USE OF INSULIN (H): Primary | ICD-10-CM

## 2019-09-30 DIAGNOSIS — J30.2 SEASONAL ALLERGIC RHINITIS, UNSPECIFIED TRIGGER: ICD-10-CM

## 2019-09-30 DIAGNOSIS — F33.1 MODERATE EPISODE OF RECURRENT MAJOR DEPRESSIVE DISORDER (H): ICD-10-CM

## 2019-09-30 DIAGNOSIS — G43.109 MIGRAINE WITH AURA AND WITHOUT STATUS MIGRAINOSUS, NOT INTRACTABLE: ICD-10-CM

## 2019-09-30 PROCEDURE — 99607 MTMS BY PHARM ADDL 15 MIN: CPT | Performed by: PHARMACIST

## 2019-09-30 PROCEDURE — 99605 MTMS BY PHARM NP 15 MIN: CPT | Performed by: PHARMACIST

## 2019-09-30 RX ORDER — NAPROXEN 500 MG/1
TABLET ORAL
Qty: 60 TABLET | Refills: 1 | Status: SHIPPED | OUTPATIENT
Start: 2019-09-30 | End: 2019-12-02

## 2019-09-30 RX ORDER — LAMOTRIGINE 200 MG/1
TABLET ORAL
Qty: 90 TABLET | Refills: 0 | OUTPATIENT
Start: 2019-09-30

## 2019-09-30 RX ORDER — FLASH GLUCOSE SENSOR
1 KIT MISCELLANEOUS
Qty: 2 EACH | Refills: 11 | Status: SHIPPED | OUTPATIENT
Start: 2019-09-30

## 2019-09-30 RX ORDER — ATORVASTATIN CALCIUM 20 MG/1
TABLET, FILM COATED ORAL
Qty: 90 TABLET | Refills: 0 | Status: SHIPPED | OUTPATIENT
Start: 2019-09-30 | End: 2019-11-18

## 2019-09-30 RX ORDER — CANAGLIFLOZIN 300 MG/1
TABLET, FILM COATED ORAL
Qty: 90 TABLET | Refills: 0 | OUTPATIENT
Start: 2019-09-30

## 2019-09-30 RX ORDER — LIRAGLUTIDE 6 MG/ML
INJECTION SUBCUTANEOUS
Qty: 6 ML | Refills: 0 | Status: SHIPPED | OUTPATIENT
Start: 2019-09-30 | End: 2019-10-28

## 2019-09-30 NOTE — PATIENT INSTRUCTIONS
Recommendations from today's MTM visit:                                                    MTM (medication therapy management) is a service provided by a clinical pharmacist designed to help you get the most of out of your medicines.   Today we reviewed what your medicines are for, how to know if they are working, that your medicines are safe and how to make your medicine regimen as easy as possible.     1. I suggest you to continue taking the omeprazole daily.  2. Stop Januvia.  3. Start Victoza 0.6 mg daily in the evening for 7 days, then increase if tolerating to 1.2 mg daily.  4. Start the continuous glucose monitor, called freestyle Judith. Download the felecia on your phone. Sensors sent to pharmacy, they replaced every 14 days.   Https://www.Jemstep.us/?source=Jemstep.MarketPage  5. I will talk to Dr. Freedman about needing a referral to psychiatry or not.     It was great to speak with you today.  I value your experience and would be very thankful for your time with providing feedback on our clinic survey. You may receive a survey via email or text message in the next few days.     Next MTM visit: 1 month    To schedule another MTM appointment, please call the clinic directly or you may call the MTM scheduling line at [phone] or toll-free at [phone].     My Clinical Pharmacist's contact information:                                                      It was a pleasure talking with you today!  Please feel free to contact me with any questions or concerns you have.      Augusta Nelson, PharmD  Medication Therapy Management Pharmacist  Pager#: [phone]

## 2019-09-30 NOTE — PROGRESS NOTES
SUBJECTIVE/OBJECTIVE:                           Bharati Villarreal is a 59 year old female coming in for an initial visit for Medication Therapy Management.  She was referred to me from Dr. Clay for elevated A1c.    Chief Complaint: Blood sugars are still high no matter what she tries.    Allergies/ADRs: Reviewed in Epic  Tobacco: 0-1 pack per day - is interested in quitting, see below.  Alcohol: not currently using  Caffeine: 1 cups/day of coffee and 2-3 cups of diet ice tea.   Activity: has a boot on   PMH: Reviewed in Epic    Medication Adherence/Access:  no issues reported. She brought in all her medications that she stores in a tote.   She takes medications BID. Uses a pill box. Sets up her and her fiances meds.  No issues with cost. Her only barrier is medication intolerances.    Smoking cessation:   Smoking 0.5-1 ppd. She is trying to cut back by thinking of distracts so that she is not grabbing a cigarette every time.  Triggers: anxiousness. She gets anxious when she drives, so habit to smoke when she drives. It has helped that she no longer drives as much. The other most common time she smokes is right after dinner.   Previously tried and failed: NRT lozenges, gum, patches; nicotrol, e-cig, bupropion.  Pt has not tried nicotine nasal spray.  Not ready to set a quit date. Working on cutting back cigarettes. She may want to try e-cigs again because she that this was the most helpful.     Diabetes:  Pt currently taking metformin 1000 mg BID, Invokana 300 mg qAM, Januvia 100 mg daily (started in March). Pt is not experiencing side effects currently.  Previously tried: Bydureon and experience severe nausea and welts at the injection site. Also has tried Actos previously. We discussed trying an alternate GLP-1 agent, she is aware she may experience intolerability again with an alternate agent in this class. She would like to try this option again.   SMBG: one time daily.   Ranges (patient reported): 100-200's.  Did not bring her meter today. She is interested in CGM - freestyle dorothy.   Patient is not experiencing hypoglycemia  Recent symptoms of high blood sugar? none  Eye exam: up to date  Foot exam: up to date  ACEi/ARB: No. Plan to recheck urinemicroalbumin.  Urine Albumin:   Lab Results   Component Value Date    UMALCR 37.59 (H) 09/18/2019   Diet: avoid regular drinks and doing diet. Doesn't like vegetables that are cooked but does like salads. Ice cream is her treat that she cannot give up.  Exercise: Limited currently due to boot from recent sprain.  Lab Results   Component Value Date    A1C 8.1 09/18/2019    A1C 8.2 05/02/2019    A1C 7.3 11/28/2018    A1C 8.4 09/12/2018    A1C 7.3 03/15/2018     Estimated Creatinine Clearance: 83.2 mL/min (based on SCr of 0.71 mg/dL).  Wt Readings from Last 4 Encounters:   09/18/19 133 lb (60.3 kg)   05/02/19 131 lb 6.4 oz (59.6 kg)   02/12/19 127 lb 1.6 oz (57.7 kg)   11/28/18 128 lb 1 oz (58.1 kg)     BP Readings from Last 3 Encounters:   09/30/19 110/70   09/18/19 104/78   05/02/19 104/68       Hyperlipidemia: Current therapy includes atorvastatin 20 mg once daily.  Pt reports no significant myalgias or other side effects.  Recent Labs   Lab Test 11/28/18  0906 06/26/17  0924  05/04/15  1534 03/20/14  1525   CHOL 102 118   < > 174 155   HDL 31* 32*   < > 34* 19*   LDL 34 20   < > Cannot estimate LDL when triglyceride exceeds 400 mg/dL  110 Cannot estimate LDL when triglyceride exceeds 400 mg/dL  75   TRIG 184* 330*   < > 476* 499*   CHOLHDLRATIO  --   --   --  5.1* 8.2*    < > = values in this interval not displayed.       Migraines: Currently on Effexor for mood stabilization. She uses Imitrex PRN and has not been using. She more frequently uses Naproxen 500 mg daily PRN (not using daily).     Depression:  Current medications include: mirtazapine 30 mg HS, lamotrigine 200 mg daily, Effexor  mg daily. Pt reports that depression symptoms are improved and feels very good.  She use to cut herself, she no longer is. She has been feeling very good and is happy with her regimen. Denies any SI. She use to work with a psychiatrist but psychiatrist left. Currently just working with a psychologist.   PHQ-9 SCORE 3/15/2018 2018 2019   PHQ-9 Total Score - - -   PHQ-9 Total Score MyChart - 8 (Mild depression) -   PHQ-9 Total Score 3 8 1     GERD: Current medications include: Prilosec (omeprazole) 20 mg most days of the week. Pt c/o reflux once in a while.  Patient feels that current regimen is effective. She is also worried about long term side effects of PPI but more concerned that her father  of cancer related to the esophagus so she wants to protect it.     Allergic rhinitis: Current medications include fluticasone nasal spray 1-2 spray(s) once daily. Primary symptoms are none. Pt feels that current therapy is effective.       Today's Vitals: /70   LMP  (LMP Unknown)   Last Comprehensive Metabolic Panel:  Sodium   Date Value Ref Range Status   2019 139 133 - 144 mmol/L Final     Potassium   Date Value Ref Range Status   2019 4.1 3.4 - 5.3 mmol/L Final     Chloride   Date Value Ref Range Status   2019 106 94 - 109 mmol/L Final     Carbon Dioxide   Date Value Ref Range Status   2019 24 20 - 32 mmol/L Final     Anion Gap   Date Value Ref Range Status   2019 9 3 - 14 mmol/L Final     Glucose   Date Value Ref Range Status   2019 209 (H) 70 - 99 mg/dL Final     Comment:     Non Fasting     Urea Nitrogen   Date Value Ref Range Status   2019 19 7 - 30 mg/dL Final     Creatinine   Date Value Ref Range Status   2019 0.71 0.52 - 1.04 mg/dL Final     GFR Estimate   Date Value Ref Range Status   2019 >90 >60 mL/min/[1.73_m2] Final     Comment:     Non  GFR Calc  Starting 2018, serum creatinine based estimated GFR (eGFR) will be   calculated using the Chronic Kidney Disease Epidemiology Collaboration    (CKD-EPI) equation.       Calcium   Date Value Ref Range Status   09/18/2019 9.7 8.5 - 10.1 mg/dL Final     Bilirubin Total   Date Value Ref Range Status   06/23/2016 0.3 0.2 - 1.3 mg/dL Final     Alkaline Phosphatase   Date Value Ref Range Status   06/23/2016 79 40 - 150 U/L Final     ALT   Date Value Ref Range Status   06/23/2016 19 0 - 50 U/L Final     AST   Date Value Ref Range Status   06/23/2016 10 0 - 45 U/L Final     TSH   Date Value Ref Range Status   10/11/2018 0.96 0.40 - 4.00 mU/L Final     Most Recent Immunizations   Administered Date(s) Administered     Influenza Quad, Recombinant, p-free (RIV4) 09/18/2019     Influenza Vaccine IM > 6 months Valent IIV4 09/27/2017     Pneumococcal 23 valent 05/04/2015     TDAP Vaccine (Adacel) 03/24/2014     Zoster vaccine, live 09/27/2017     ASSESSMENT:                             Current medications were reviewed today as discussed above.     Medication Adherence: excellent, no issues identified    Smoking cessation: Needs Improvement. Pt is not ready to quit using tobacco.  We discussed: ways to prepare for a quit date.     Diabetes: Needs Improvement. Patient is not meeting A1c goal of < 7%. Pt would benefit from switch from DPP4 inhibitor to daily GLP-1 RA. Discussed the need to slowly increase therapy. Will also prefer daily vs weekly so that if pt has SE she can quickly stop therapy.   Pt would also benefit from CGM to aid in improving diet, detecting low BG and improving adherence to monitoring.  Future may consider if fail daily GLP-1 RA, starting glipizide.  A1c in 3 months.    Hyperlipidemia: Stable. Pt is on moderate intensity statin which is indicated based on 2019 ACC/AHA guidelines for lipid management.  FLP due in Nov.     Migraines: stable.    Depression: Stable. However, pt likely needs clarification who will be managing medication ongoing. Likely benefit from repeat LFTs to ensure safety of therapy.    GERD: Stable.  Current treatment is  effective.    Allergic rhinitis: Stable.     PLAN:                            1. Stop Januvia. Start Victoza 0.6mg daily x7 days, then if tolerate increase 1.2 mg daily.  2. Start FreeStyle Judith. Link sent to email to set up Judith view.  3. MTM to sent message to PCP regarding need for psychiatry referral.    Future labs: CMP, A1c, FLP    I spent 70 minutes with this patient today. All changes were made via collaborative practice agreement with Dr. Freedman. A copy of the visit note was provided to the patient's primary care provider.    Will follow up in 1 month.    The patient was given a summary of these recommendations as an after visit summary.     Augusta Nelson, PharmD  Medication Therapy Management Pharmacist  Pager#: [phone]

## 2019-09-30 NOTE — TELEPHONE ENCOUNTER
Prescription approved per [facility] Refill Protocol.  Zhanna Nielson RN

## 2019-09-30 NOTE — Clinical Note
Patient was wondering if you were willing to continue managing her lamotrigine, Effexor and Mirtazapine or if she should establish with psychiatry. She is willing to see a psychiatrist even though she thinks she does not need one at the moment. Thanks!

## 2019-10-01 ENCOUNTER — MYC MEDICAL ADVICE (OUTPATIENT)
Dept: PHARMACY | Facility: CLINIC | Age: 59
End: 2019-10-01

## 2019-10-01 DIAGNOSIS — E11.9 TYPE 2 DIABETES MELLITUS WITHOUT COMPLICATION, WITHOUT LONG-TERM CURRENT USE OF INSULIN (H): Primary | ICD-10-CM

## 2019-10-10 RX ORDER — FLASH GLUCOSE SCANNING READER
1 EACH MISCELLANEOUS DAILY
Qty: 1 DEVICE | Refills: 0 | Status: SHIPPED | OUTPATIENT
Start: 2019-10-10 | End: 2020-07-27

## 2019-10-10 NOTE — TELEPHONE ENCOUNTER
Reason for call:  Other   Patient called regarding (reason for call): call back  Additional comments: Pt calling to inform Augusta Nelson Pharmacist, the blood testing patch it is NOT compatible with her cell phone. Pt needs a meter to have it working.     Please, call back for recommendations.        Phone number to reach patient:  Home number on file [phone] (home)    Best Time:  ANY TIME    Can we leave a detailed message on this number?  YES

## 2019-10-10 NOTE — TELEPHONE ENCOUNTER
Freestyle Judith  sent per CPA with Dr. Freedman.     Augusta Nelson, PharmD  Medication Therapy Management Pharmacist  Pager#: [phone]

## 2019-10-28 ENCOUNTER — OFFICE VISIT (OUTPATIENT)
Dept: PHARMACY | Facility: CLINIC | Age: 59
End: 2019-10-28
Payer: COMMERCIAL

## 2019-10-28 DIAGNOSIS — F33.1 MODERATE EPISODE OF RECURRENT MAJOR DEPRESSIVE DISORDER (H): ICD-10-CM

## 2019-10-28 DIAGNOSIS — E11.9 TYPE 2 DIABETES MELLITUS WITHOUT COMPLICATION, WITHOUT LONG-TERM CURRENT USE OF INSULIN (H): ICD-10-CM

## 2019-10-28 DIAGNOSIS — Z72.0 TOBACCO ABUSE: Primary | ICD-10-CM

## 2019-10-28 PROCEDURE — 99606 MTMS BY PHARM EST 15 MIN: CPT | Performed by: PHARMACIST

## 2019-10-28 RX ORDER — LIRAGLUTIDE 6 MG/ML
1.8 INJECTION SUBCUTANEOUS DAILY
Qty: 27 ML | Refills: 0 | Status: SHIPPED | OUTPATIENT
Start: 2019-10-28 | End: 2019-10-28

## 2019-10-28 RX ORDER — LIRAGLUTIDE 6 MG/ML
1.8 INJECTION SUBCUTANEOUS DAILY
Qty: 27 ML | Refills: 0 | Status: SHIPPED | OUTPATIENT
Start: 2019-10-28 | End: 2019-11-18

## 2019-10-28 NOTE — PROGRESS NOTES
SUBJECTIVE/OBJECTIVE:                           Bharati Villarreal is a 59 year old female called for Medication Therapy Management.  She was referred to me from Dr. Clay. PCP is Dr. Freedman.     Chief Complaint: Follow-up from 9/30/19. Diabetes review.     Tobacco: 0-1 pack per day - is interested in quitting, see below.  Alcohol: not currently using      Medication Adherence/Access:  no issues reported. She brought in all her medications that she stores in a tote.   She takes medications BID. Uses a pill box. Sets up her and her fiances meds.  No issues with cost. Her only barrier is medication intolerances.    Smoking cessation:   Smoking 0.5-1 ppd. Has not cut back and is not ready to set a quit date. She has been very stressed with her SO in the hospital and in ICU.   Triggers: anxiousness. She gets anxious when she drives, so habit to smoke when she drives. The other most common time she smokes is right after dinner.   Previously tried and failed: NRT lozenges, gum, patches; nicotrol, e-cig, bupropion. Pt has not tried nicotine nasal spray.      Diabetes:  Pt currently taking metformin 1000 mg BID, Invokana 300 mg qAM, Victoza 1.2 mg daily (has been on the for 2-3 weeks). Stopped Januvia 100 mg daily. Pt states the new injectable agent (Victoza) has been going very well. Pt is not experiencing side effects currently.  Previously tried: Bydureon, Actos  SMBG: CGM-FreeStyle Judith.  Ranges (patient reported): 150-180's  Patient is not experiencing hypoglycemia  Recent symptoms of high blood sugar? none  Eye exam: up to date  Foot exam: up to date  ACEi/ARB: No. Plan to recheck urinemicroalbumin.  Urine Albumin:   Lab Results   Component Value Date    UMALCR 37.59 (H) 09/18/2019     Lab Results   Component Value Date    A1C 8.1 09/18/2019    A1C 8.2 05/02/2019    A1C 7.3 11/28/2018    A1C 8.4 09/12/2018    A1C 7.3 03/15/2018     Estimated Creatinine Clearance: 83.2 mL/min (based on SCr of 0.71  "mg/dL).    Depression:  Current medications include: mirtazapine 30 mg HS, lamotrigine 200 mg daily, and Effexor  mg daily. Pt reports that depression symptoms are still doing well even though more stressed from SO in hospital. Denies any SI. She use to work with a psychiatrist but psychiatrist left. Currently just working with a psychologist.   PHQ-9 SCORE 3/15/2018 9/12/2018 2/12/2019   PHQ-9 Total Score - - -   PHQ-9 Total Score MyChart - 8 (Mild depression) -   PHQ-9 Total Score 3 8 1     Today's Vitals: Televisit no vitals at this visit.   BP Readings from Last 1 Encounters:   09/30/19 110/70     Pulse Readings from Last 1 Encounters:   09/18/19 99     Wt Readings from Last 1 Encounters:   09/18/19 133 lb (60.3 kg)     Ht Readings from Last 1 Encounters:   09/18/19 5' 4.25\" (1.632 m)     Estimated body mass index is 22.65 kg/m  as calculated from the following:    Height as of 9/18/19: 5' 4.25\" (1.632 m).    Weight as of 9/18/19: 133 lb (60.3 kg).    Temp Readings from Last 1 Encounters:   05/02/19 98.1  F (36.7  C) (Oral)     Last Comprehensive Metabolic Panel:  Sodium   Date Value Ref Range Status   09/18/2019 139 133 - 144 mmol/L Final     Potassium   Date Value Ref Range Status   09/18/2019 4.1 3.4 - 5.3 mmol/L Final     Chloride   Date Value Ref Range Status   09/18/2019 106 94 - 109 mmol/L Final     Carbon Dioxide   Date Value Ref Range Status   09/18/2019 24 20 - 32 mmol/L Final     Anion Gap   Date Value Ref Range Status   09/18/2019 9 3 - 14 mmol/L Final     Glucose   Date Value Ref Range Status   09/18/2019 209 (H) 70 - 99 mg/dL Final     Comment:     Non Fasting     Urea Nitrogen   Date Value Ref Range Status   09/18/2019 19 7 - 30 mg/dL Final     Creatinine   Date Value Ref Range Status   09/18/2019 0.71 0.52 - 1.04 mg/dL Final     GFR Estimate   Date Value Ref Range Status   09/18/2019 >90 >60 mL/min/[1.73_m2] Final     Comment:     Non  GFR Calc  Starting 12/18/2018, serum " creatinine based estimated GFR (eGFR) will be   calculated using the Chronic Kidney Disease Epidemiology Collaboration   (CKD-EPI) equation.       Calcium   Date Value Ref Range Status   09/18/2019 9.7 8.5 - 10.1 mg/dL Final     Bilirubin Total   Date Value Ref Range Status   06/23/2016 0.3 0.2 - 1.3 mg/dL Final     Alkaline Phosphatase   Date Value Ref Range Status   06/23/2016 79 40 - 150 U/L Final     ALT   Date Value Ref Range Status   06/23/2016 19 0 - 50 U/L Final     AST   Date Value Ref Range Status   06/23/2016 10 0 - 45 U/L Final     TSH   Date Value Ref Range Status   10/11/2018 0.96 0.40 - 4.00 mU/L Final     Most Recent Immunizations   Administered Date(s) Administered     Influenza Quad, Recombinant, p-free (RIV4) 09/18/2019     Influenza Vaccine IM > 6 months Valent IIV4 09/27/2017     Pneumococcal 23 valent 05/04/2015     TDAP Vaccine (Adacel) 03/24/2014     Zoster vaccine, live 09/27/2017     ASSESSMENT:                             Current medications were reviewed today as discussed above.     Medication Adherence: excellent, no issues identified    Smoking cessation: unchanged. Pt is still not ready to quit using tobacco.     Diabetes: Needs Improvement. Patient is not meeting A1c goal of < 7%. Pt would benefit from increase in daily GLP-1 therapy: liraglutide 1.8 mg daily. Pt is not meeting FBG goal of  mg/dL.  Pt would also benefit from CGM to aid in improving diet, detecting low BG and improving adherence to monitoring.  Next A1c in Dec.    Depression: Stable.     PLAN:                            1. Increase Victoza 1.8 mg daily.   Future: labs before PCP visit (need to make a lab only visit).    I spent 15 minutes with this patient today. All changes were made via collaborative practice agreement with Dr. Freedman. A copy of the visit note was provided to the patient's primary care provider.    Will follow up in 1 month. If A1c at goal in December recommend q6 follow-up with MTM.     The  patient declined a summary of these recommendations as an after visit summary.     Augusta Nelson, PharmD  Medication Therapy Management Pharmacist  Pager#: [phone]

## 2019-11-06 ENCOUNTER — TELEPHONE (OUTPATIENT)
Dept: PEDIATRICS | Facility: CLINIC | Age: 59
End: 2019-11-06

## 2019-11-06 NOTE — TELEPHONE ENCOUNTER
Prior Authorization Retail Medication Request    Medication/Dose: liraglutide (VICTOZA PEN) 18 MG/3ML solution  ICD code (if different than what is on RX):  [E11.9]  Previously Tried and Failed: liraglutide (VICTOZA PEN) 18 MG/3ML solution (Discontinued)  Rationale:   [E11.9]    Insurance Name:  [facility]  Insurance ID:  [id number]      Pharmacy Information (if different than what is on RX)  [facility]   Phone:  [phone]    Sylvia Fleming MA 9:28 AM 11/6/2019

## 2019-11-07 NOTE — TELEPHONE ENCOUNTER
Central Prior Authorization Team   Phone: [phone]      PA Initiation    Medication: liraglutide (VICTOZA PEN) 18 MG/3ML solution - INITIATED  Insurance Company: [employer] [place] - Phone [phone] Fax [phone]  Pharmacy Filling the Rx: [id number] - [place], [place] - [address] AT [address]  Filling Pharmacy Phone: [phone]  Filling Pharmacy Fax: [phone]  Start Date: 11/7/2019

## 2019-11-08 NOTE — TELEPHONE ENCOUNTER
Central Prior Authorization Team   Phone: [phone]    Pharmacy stated the prescription received on 10/28/2019 is closed and are requesting a new Rx to be sent.    Prior Authorization Approval    Authorization Effective Date: 11/5/2019  Authorization Expiration Date: 11/5/2020  Medication: liraglutide (VICTOZA PEN) 18 MG/3ML solution - APPROVED  Approved Dose/Quantity: 27 FOR 90  Reference #:     Insurance Company: [employer] [place] - Phone [phone] Fax [phone]  Expected CoPay:       CoPay Card Available:      Foundation Assistance Needed:    Which Pharmacy is filling the prescription (Not needed for infusion/clinic administered): [id number] - [place], [place] - [address] AT [address]  Pharmacy Notified: Yes  Patient Notified: Yes (**Instructed pharmacy to notify patient when script is ready to /ship.**)

## 2019-11-18 ENCOUNTER — OFFICE VISIT (OUTPATIENT)
Dept: PHARMACY | Facility: CLINIC | Age: 59
End: 2019-11-18
Payer: COMMERCIAL

## 2019-11-18 VITALS — DIASTOLIC BLOOD PRESSURE: 78 MMHG | BODY MASS INDEX: 21.7 KG/M2 | WEIGHT: 127.4 LBS | SYSTOLIC BLOOD PRESSURE: 128 MMHG

## 2019-11-18 DIAGNOSIS — Z71.85 VACCINE COUNSELING: ICD-10-CM

## 2019-11-18 DIAGNOSIS — E11.9 TYPE 2 DIABETES MELLITUS WITHOUT COMPLICATION, WITHOUT LONG-TERM CURRENT USE OF INSULIN (H): Primary | ICD-10-CM

## 2019-11-18 DIAGNOSIS — Z72.0 TOBACCO ABUSE: ICD-10-CM

## 2019-11-18 DIAGNOSIS — G43.109 MIGRAINE WITH AURA AND WITHOUT STATUS MIGRAINOSUS, NOT INTRACTABLE: ICD-10-CM

## 2019-11-18 DIAGNOSIS — G47.00 INSOMNIA, UNSPECIFIED TYPE: ICD-10-CM

## 2019-11-18 DIAGNOSIS — F33.1 MODERATE EPISODE OF RECURRENT MAJOR DEPRESSIVE DISORDER (H): ICD-10-CM

## 2019-11-18 PROCEDURE — 99607 MTMS BY PHARM ADDL 15 MIN: CPT | Performed by: PHARMACIST

## 2019-11-18 PROCEDURE — 99606 MTMS BY PHARM EST 15 MIN: CPT | Performed by: PHARMACIST

## 2019-11-18 RX ORDER — LIRAGLUTIDE 6 MG/ML
1.8 INJECTION SUBCUTANEOUS DAILY
Qty: 27 ML | Refills: 0 | Status: SHIPPED | OUTPATIENT
Start: 2019-11-18 | End: 2019-12-24

## 2019-11-18 RX ORDER — ATORVASTATIN CALCIUM 20 MG/1
20 TABLET, FILM COATED ORAL DAILY
Qty: 90 TABLET | Refills: 0 | Status: SHIPPED | OUTPATIENT
Start: 2019-11-18 | End: 2019-12-24

## 2019-11-18 NOTE — PROGRESS NOTES
SUBJECTIVE/OBJECTIVE:                Bharati Villarreal is a 59 year old female coming in for a follow-up visit for Medication Therapy Management.  She was referred to me from Dr. Clay. PCP is [facility].     Chief Complaint: Follow up from [facility] visit on ***.  ***    Tobacco: 0-1 pack per day - is interested in quitting, see below.  Alcohol: not currently using      Medication Adherence/Access:  no issues reported. She brought in all her medications that she stores in a tote.   She takes medications BID. Uses a pill box. Sets up her and her fiances meds.  No issues with cost. Her only barrier is medication intolerances.    Smoking cessation:   Smoking 0.5-1 ppd. Has not cut back and is not ready to set a quit date. She has been very stressed with her SO in the hospital and in ICU.   Triggers: anxiousness. She gets anxious when she drives, so habit to smoke when she drives. The other most common time she smokes is right after dinner.   Previously tried and failed: NRT lozenges, gum, patches; nicotrol, e-cig, bupropion. Pt has not tried nicotine nasal spray.      Diabetes:  Pt currently taking metformin 1000 mg BID, Invokana 300 mg qAM, Victoza 1.2 mg daily (has been on the for 2-3 weeks). Stopped Januvia 100 mg daily. Pt states the new injectable agent (Victoza) has been going very well. Pt is not experiencing side effects currently.  Previously tried: Bydureon, Actos  SMBG: CGM-FreeStyle Judith.  Ranges (patient reported): 150-180's  Patient is not experiencing hypoglycemia  Recent symptoms of high blood sugar? none  Eye exam: up to date  Foot exam: up to date  ACEi/ARB: No. Plan to recheck urinemicroalbumin.  Urine Albumin:   Lab Results   Component Value Date    UMALCR 37.59 (H) 09/18/2019     Lab Results   Component Value Date    A1C 8.1 09/18/2019    A1C 8.2 05/02/2019    A1C 7.3 11/28/2018    A1C 8.4 09/12/2018    A1C 7.3 03/15/2018     Estimated Creatinine Clearance: 83.2 mL/min (based on SCr of 0.71  "mg/dL).    Depression:  Current medications include: mirtazapine 30 mg HS, lamotrigine 200 mg daily, and Effexor  mg daily. Pt reports that depression symptoms are still doing well even though more stressed from SO in hospital. Denies any SI. She use to work with a psychiatrist but psychiatrist left. Currently just working with a psychologist.     Today's Vitals: LMP  (LMP Unknown)       ASSESSMENT:              {mtmpartdquestion:895067}    Medication Adherence: {adherenceassess:115883}, {ADHERENCEOPTIONSASSES:458596}    ***: ***  ***: ***  ***: ***  ***: ***  ***: ***     PLAN:                {JENELLE?:895543}  ***    I spent {time:871670} with this patient today{MTMpartdbillingquestion:288287}. { :266009}. A copy of the visit note was provided to the patient's {[facility] chart:467103} provider.     Will follow up in ***.    The patient {GIVEN/NOT GIVEN:462156::\"was given\"} a summary of these recommendations as an after visit summary.    ***  "

## 2019-11-18 NOTE — PROGRESS NOTES
/SUBJECTIVE/OBJECTIVE:                Bharati Villarreal is a 59 year old female coming in for a follow-up visit for Medication Therapy Management.  She was referred to me from Dr. Clay. PCP is Tano.     Chief Complaint: Follow up from [facility] visit on 10/28/19.  DM follow-up. Fiance recently passed aware in the past 1-2 weeks.     Tobacco: 0-1 pack per day - is interested in quitting, see below.  Alcohol: not currently using      Medication Adherence/Access:  no issues reported. She brought in all her medications that she stores in a tote.   She takes medications BID. Uses a pill box. Sets up her and her fiances meds.  No issues with cost. Her only barrier is medication intolerances.    Smoking cessation:   Smoking < 0.5-1 ppd since there has been a lot of family at her house and she has been too exhausted/busy to smoke. She is not interested in actively creating a smoking cessation plan. She is not sure if she will be able to sustain the lower ppd lately.   Triggers: anxiousness. She gets anxious when she drives, so habit to smoke when she drives. The other most common time she smokes is right after dinner.   Previously tried and failed: NRT lozenges, gum, patches; nicotrol, e-cig, bupropion. Pt has not tried nicotine nasal spray.      Diabetes:  Pt currently taking metformin IR 1000 mg BID, Invokana 300 mg qAM, Victoza 1.2 mg daily (has been on the for 2-3 weeks). Admits she has missed some doses with so much happening lately. She also missed 3 days of Victoza. Pt is not experiencing side effects currently.  Previously tried: Bydureon, Actos, Januvia  Diet: Has been only 1x a day. Mostly eating frozen dinners. Poor appetite.   SMBG: CGM-FreeStyle Judith.        Patient is not experiencing hypoglycemia  Recent symptoms of high blood sugar? none  Eye exam: up to date  Foot exam: up to date  ACEi/ARB: No. Plan to recheck urinemicroalbumin.  Urine Albumin:   Lab Results   Component Value Date    UMALCR 37.59 (H)  09/18/2019     /Lab Results   Component Value Date    A1C 8.1 09/18/2019    A1C 8.2 05/02/2019    A1C 7.3 11/28/2018    A1C 8.4 09/12/2018    A1C 7.3 03/15/2018     Estimated Creatinine Clearance: 83.2 mL/min (based on SCr of 0.71 mg/dL).    Depression:  Current medications include: mirtazapine 30 mg HS, lamotrigine 200 mg daily, and Effexor  mg daily. Pt reports that depression symptoms worse since death of her fiance. However, right away she called her psychologist and they agreed to meet every week, has an appointment tomorrow. She is having trouble falling asleep, poor appetite, poor energy, and having more headaches. Denies any SI. She feels she is doing much better than when her  pass away. She was very low then and she states she is not going let herself get that low again.   Support: her sisters have been really good support, one of her sisters is moving with her. Her kids have also been reaching out/checking-in on her.     Migraines/Insomnia: she has been using naproxen more frequently due to daily HA and poor sleep. She has had a lot of guests at her place which messed up her regular sleep schedule. Acetaminophen is not effective for headache. Has also been using more Imitrex PRN. She finds the naproxen and Imitrex to be effective.      Immunizations: She is wondering if she needs her tetanus shot. Has not received Shingrix.   Most Recent Immunizations   Administered Date(s) Administered     Influenza Quad, Recombinant, p-free (RIV4) 09/18/2019     Influenza Vaccine IM > 6 months Valent IIV4 09/27/2017     Pneumococcal 23 valent 05/04/2015     TDAP Vaccine (Adacel) 03/24/2014     Zoster vaccine, live 09/27/2017       Today's Vitals: /78   Wt 127 lb 6.4 oz (57.8 kg)   LMP  (LMP Unknown)   BMI 21.70 kg/m      Last Comprehensive Metabolic Panel:  Sodium   Date Value Ref Range Status   09/18/2019 139 133 - 144 mmol/L Final     Potassium   Date Value Ref Range Status   09/18/2019 4.1 3.4 -  5.3 mmol/L Final     Chloride   Date Value Ref Range Status   09/18/2019 106 94 - 109 mmol/L Final     Carbon Dioxide   Date Value Ref Range Status   09/18/2019 24 20 - 32 mmol/L Final     Anion Gap   Date Value Ref Range Status   09/18/2019 9 3 - 14 mmol/L Final     Glucose   Date Value Ref Range Status   09/18/2019 209 (H) 70 - 99 mg/dL Final     Comment:     Non Fasting     Urea Nitrogen   Date Value Ref Range Status   09/18/2019 19 7 - 30 mg/dL Final     Creatinine   Date Value Ref Range Status   09/18/2019 0.71 0.52 - 1.04 mg/dL Final     GFR Estimate   Date Value Ref Range Status   09/18/2019 >90 >60 mL/min/[1.73_m2] Final     Comment:     Non  GFR Calc  Starting 12/18/2018, serum creatinine based estimated GFR (eGFR) will be   calculated using the Chronic Kidney Disease Epidemiology Collaboration   (CKD-EPI) equation.       Calcium   Date Value Ref Range Status   09/18/2019 9.7 8.5 - 10.1 mg/dL Final     Bilirubin Total   Date Value Ref Range Status   06/23/2016 0.3 0.2 - 1.3 mg/dL Final     Alkaline Phosphatase   Date Value Ref Range Status   06/23/2016 79 40 - 150 U/L Final     ALT   Date Value Ref Range Status   06/23/2016 19 0 - 50 U/L Final     AST   Date Value Ref Range Status   06/23/2016 10 0 - 45 U/L Final       ASSESSMENT:                  Medication Adherence: fair, needs improvement - see below    Smoking Cessation: improved. Though pt still not ready to quit.    Diabetes: Improved. A1c due next month. Patient is not meeting A1c goal of < 7%. Pt would benefit from improving diet. Encouraged pt to increase protein intake and minimize high carbohydrate foods.    Depression: Unimproved. Pt would benefit from working with psychologist more closely. Future to consider increase in mirtazapine if continues to poor appetite or venlafaxine for headache prevention as well.     Migraine/Insomnia: Needs improvement. Pt may benefit from melatonin as needed. Will continue to monitor for  improvement in migraines.    Immunizations: Needs improvement. Pt is > 50 years old and may benefit from Shingrix. Pt is up to date on tetanus at this time.       PLAN:                  1. Pt to work on increasing protein in diet and decreasing carbohydrates.  2. Pt may try OTC melatonin 3-5 mg 30-60 mins before bed PRN.  3. Pt may ask insurance or pharmacy about coverage for Shingrix vaccine.     I spent 30 minutes with this patient today. All changes were made via collaborative practice agreement with Opal Brown. A copy of the visit note was provided to the patient's primary care provider.     Will follow up in 2 week phone check-in on sleep and mood.    The patient was given a summary of these recommendations as an after visit summary.    Augusta Nelson, PharmD  Medication Therapy Management Pharmacist  Pager#: [phone]

## 2019-11-18 NOTE — PATIENT INSTRUCTIONS
Recommendations from today's MTM visit:                                                      1. You can go to the pharmacy and ask about the shingles vaccine, called Shingrix.  2. Continue the good work! I will send a link to your email from [employer] to get readings from home. Try to scan your reader at least every 8 hours.   3. Try to have a snack with protein even if you are not very hungry.   4. You can try over-the-counter melatonin 3-5 mg 30-60 mins before bedtime as needed to help with falling asleep.    It was great to speak with you today.  I value your experience and would be very thankful for your time with providing feedback on our clinic survey. You may receive a survey via email or text message in the next few days.     Next MTM visit: 2 weeks phone check in.     To schedule another MTM appointment, please call the clinic directly or you may call the MTM scheduling line at [phone] or toll-free at [phone].     My Clinical Pharmacist's contact information:                                                      It was a pleasure talking with you today!  Please feel free to contact me with any questions or concerns you have.      Augusta Nelson, PharmD  Medication Therapy Management Pharmacist  Pager#: [phone]

## 2019-11-29 NOTE — PROGRESS NOTES
SUBJECTIVE/OBJECTIVE:                Bharati Villarreal is a 59 year old female coming in for a follow-up visit for Medication Therapy Management.  She was referred to me from Dr. Freedman.     Chief Complaint: Follow up from [facility] visit on 11/18/19. Mood/sleep/headache follow-up.    Tobacco: 0-1 pack per day - is interested in quitting, see below.  Alcohol: not currently using      Medication Adherence/Access:  no issues reported. She brought in all her medications that she stores in a tote.   She takes medications BID. Uses a pill box. Sets up her and her fiances meds.  No issues with cost. Her only barrier is medication intolerances.    Smoking cessation:   Smoking < 0.5-1 ppd - unchanged from our last visit. She is not interested in actively creating a smoking cessation plan. She has been driving a lot less which has helped decrease smoking.  Triggers: anxiousness. She gets anxious when she drives, so habit to smoke when she drives. The other most common time she smokes is right after dinner.   Previously tried and failed: NRT lozenges, gum, patches; nicotrol, e-cig, bupropion. Pt has not tried nicotine nasal spray.    Diabetes:  Pt currently taking metformin IR 1000 mg BID, Invokana 300 mg qAM, Victoza 1.8 mg daily. Pt is not experiencing side effects currently.  Previously tried: Bydureon, Actos, Januvia  Diet: Has been eating only 1 meal a day, but will try to snack when she is not eating a meal. Still has a poor appetite.   SMBG: CGM-FreeStyle Judith.  -160's, couple in 200's. She has been drinking more coke to replace urge to want to drink.   Patient is not experiencing hypoglycemia  Recent symptoms of high blood sugar? none  Eye exam: up to date  Foot exam: up to date  ACEi/ARB: No. Plan to recheck urinemicroalbumin.  Urine Albumin:   Lab Results   Component Value Date    UMALCR 37.59 (H) 09/18/2019     /Lab Results   Component Value Date    A1C 8.1 09/18/2019    A1C 8.2 05/02/2019    A1C 7.3 11/28/2018     "A1C 8.4 09/12/2018    A1C 7.3 03/15/2018     Estimated Creatinine Clearance: 83.2 mL/min (based on SCr of 0.71 mg/dL).    Depression/Insomnia:  Current medications include: mirtazapine 30 mg HS, lamotrigine 200 mg daily, and Effexor  mg daily. Pt reports that depression symptoms worse since death of her fiance, however unchanged since our last visit. She denies have many low days. She is working with her psychologist every week, which has been helpful. She also recently got a new puppy that has kept her busy. She continues to have poor appetite, poor energy, and more headaches. Denies any SI. She has used a medication that started with a \"L\", (likely lorazepam) 1 time since the death due to stress and anxiety. She has been trying to limit the use of that. She normally starts with breathing exercises first and if that does not help will then take lorazepam.   Support: her mother has been calling her every other day to check in and her sister recently moved in with her.     Migraines: She is  Still taking naproxen frequently for HA and poor sleep. If severe HA/migraine, will use sumatriptan PRN. She has taken sumatriptan 1 time this week and it was effective. Acetaminophen is not effective for headache. Triggers: stress from recent death of significant other and planning services for significant other.      Today's Vitals: LMP  (LMP Unknown) Televisit.  BP Readings from Last 1 Encounters:   11/18/19 128/78     Pulse Readings from Last 1 Encounters:   09/18/19 99     Wt Readings from Last 1 Encounters:   11/18/19 127 lb 6.4 oz (57.8 kg)     Ht Readings from Last 1 Encounters:   09/18/19 5' 4.25\" (1.632 m)     Estimated body mass index is 21.7 kg/m  as calculated from the following:    Height as of 9/18/19: 5' 4.25\" (1.632 m).    Weight as of 11/18/19: 127 lb 6.4 oz (57.8 kg).    Temp Readings from Last 1 Encounters:   05/02/19 98.1  F (36.7  C) (Oral)         ASSESSMENT:                  Medication Adherence: " good, no issues identified    Smoking cessation: unchanged.     Diabetes: Unchanged. SMBG are not consistently at goal of  mg/dL. Encourage pt to continue working on diet at this time. A1c due soon. Future considerations if A1c not improved: addition of glipizide IR with meal, though favorable to have pt work on improving diet (consider CDE referral).     Depression/Migraines: unchanged. Pt to continue working with psychologist. HA/Migraine seem to related to new stress. May consider increase in mirtazapine to improve mood and appetite - or - if anxiety is worse would consider PRN agent such as hydroxyzine.     Insomnia: improved.      PLAN:                  1. Lab: lab appt made for prior to PCP visit.   2. Encouraged pt to reach out if feeling more anxious or having more low days. Pt to continue working with psychologist.  3. No medication changes at this time.     I spent 15 minutes with this patient today. No changes today. A copy of the visit note was provided to the patient's primary care provider.     Will follow up in 2-3 months if A1c not at goal.    The patient declined a summary of these recommendations as an after visit summary.    Augusta Nelson, PharmD  Medication Therapy Management Pharmacist  Pager#: [phone]

## 2019-12-02 ENCOUNTER — ALLIED HEALTH/NURSE VISIT (OUTPATIENT)
Dept: PHARMACY | Facility: CLINIC | Age: 59
End: 2019-12-02
Payer: COMMERCIAL

## 2019-12-02 DIAGNOSIS — E11.9 TYPE 2 DIABETES MELLITUS WITHOUT COMPLICATION, WITHOUT LONG-TERM CURRENT USE OF INSULIN (H): ICD-10-CM

## 2019-12-02 DIAGNOSIS — G47.00 INSOMNIA, UNSPECIFIED TYPE: ICD-10-CM

## 2019-12-02 DIAGNOSIS — F33.1 MODERATE EPISODE OF RECURRENT MAJOR DEPRESSIVE DISORDER (H): Primary | ICD-10-CM

## 2019-12-02 DIAGNOSIS — G43.109 MIGRAINE WITH AURA AND WITHOUT STATUS MIGRAINOSUS, NOT INTRACTABLE: ICD-10-CM

## 2019-12-02 DIAGNOSIS — Z72.0 TOBACCO ABUSE: ICD-10-CM

## 2019-12-02 PROCEDURE — 99606 MTMS BY PHARM EST 15 MIN: CPT | Performed by: PHARMACIST

## 2019-12-03 RX ORDER — NAPROXEN 500 MG/1
TABLET ORAL
Qty: 60 TABLET | Refills: 0 | Status: SHIPPED | OUTPATIENT
Start: 2019-12-03 | End: 2019-12-26

## 2019-12-03 NOTE — TELEPHONE ENCOUNTER
"Requested Prescriptions   Pending Prescriptions Disp Refills     naproxen (NAPROSYN) 500 MG tablet [Pharmacy Med Name: NAPROXEN 500MG TABLETS] 60 tablet 0     Sig: TAKE 1 TABLET(500 MG) BY MOUTH TWICE DAILY AS NEEDED FOR MODERATE PAIN       NSAID Medications Failed - 12/2/2019  2:16 PM        Failed - Normal ALT on file in past 12 months     Recent Labs   Lab Test 06/23/16  0747   ALT 19             Failed - Normal AST on file in past 12 months     Recent Labs   Lab Test 06/23/16  0747   AST 10             Failed - Normal CBC on file in past 12 months     Recent Labs   Lab Test 08/12/15  0827 05/04/15  1534   WBC  --  8.4   RBC  --  4.84   HGB 14.1 14.0   HCT  --  41.6   PLT  --  276                 Passed - Blood pressure under 140/90 in past 12 months     BP Readings from Last 3 Encounters:   11/18/19 128/78   09/30/19 110/70   09/18/19 104/78                 Passed - Recent (12 mo) or future (30 days) visit within the authorizing provider's specialty     Patient has had an office visit with the authorizing provider or a provider within the authorizing providers department within the previous 12 mos or has a future within next 30 days. See \"Patient Info\" tab in inbasket, or \"Choose Columns\" in Meds & Orders section of the refill encounter.              Passed - Patient is age 6-64 years        Passed - Medication is active on med list        Passed - No active pregnancy on record        Passed - Normal serum creatinine on file in past 12 months     Recent Labs   Lab Test 09/18/19  1228   CR 0.71             Passed - No positive pregnancy test in past 12 months        Routing refill request to provider for review/approval because:  Labs not current  Patient does have future appointment scheduled for labs        "

## 2019-12-21 DIAGNOSIS — E11.9 TYPE 2 DIABETES MELLITUS WITHOUT COMPLICATION, WITHOUT LONG-TERM CURRENT USE OF INSULIN (H): ICD-10-CM

## 2019-12-21 DIAGNOSIS — E78.5 HYPERLIPIDEMIA WITH TARGET LDL LESS THAN 100: Chronic | ICD-10-CM

## 2019-12-21 DIAGNOSIS — F33.1 MODERATE EPISODE OF RECURRENT MAJOR DEPRESSIVE DISORDER (H): ICD-10-CM

## 2019-12-21 LAB
ALBUMIN SERPL-MCNC: 3.9 G/DL (ref 3.4–5)
ALP SERPL-CCNC: 99 U/L (ref 40–150)
ALT SERPL W P-5'-P-CCNC: 40 U/L (ref 0–50)
ANION GAP SERPL CALCULATED.3IONS-SCNC: 4 MMOL/L (ref 3–14)
AST SERPL W P-5'-P-CCNC: 31 U/L (ref 0–45)
BILIRUB SERPL-MCNC: 0.2 MG/DL (ref 0.2–1.3)
BUN SERPL-MCNC: 13 MG/DL (ref 7–30)
CALCIUM SERPL-MCNC: 9.2 MG/DL (ref 8.5–10.1)
CHLORIDE SERPL-SCNC: 108 MMOL/L (ref 94–109)
CHOLEST SERPL-MCNC: 120 MG/DL
CO2 SERPL-SCNC: 27 MMOL/L (ref 20–32)
CREAT SERPL-MCNC: 0.68 MG/DL (ref 0.52–1.04)
CREAT UR-MCNC: 44 MG/DL
GFR SERPL CREATININE-BSD FRML MDRD: >90 ML/MIN/{1.73_M2}
GLUCOSE SERPL-MCNC: 189 MG/DL (ref 70–99)
HBA1C MFR BLD: 6.9 % (ref 0–5.6)
HDLC SERPL-MCNC: 31 MG/DL
LDLC SERPL CALC-MCNC: 25 MG/DL
MICROALBUMIN UR-MCNC: 20 MG/L
MICROALBUMIN/CREAT UR: 44.72 MG/G CR (ref 0–25)
NONHDLC SERPL-MCNC: 89 MG/DL
POTASSIUM SERPL-SCNC: 4.4 MMOL/L (ref 3.4–5.3)
PROT SERPL-MCNC: 7.2 G/DL (ref 6.8–8.8)
SODIUM SERPL-SCNC: 139 MMOL/L (ref 133–144)
TRIGL SERPL-MCNC: 318 MG/DL

## 2019-12-21 PROCEDURE — 83036 HEMOGLOBIN GLYCOSYLATED A1C: CPT | Performed by: INTERNAL MEDICINE

## 2019-12-21 PROCEDURE — 36415 COLL VENOUS BLD VENIPUNCTURE: CPT | Performed by: INTERNAL MEDICINE

## 2019-12-21 PROCEDURE — 82043 UR ALBUMIN QUANTITATIVE: CPT | Performed by: INTERNAL MEDICINE

## 2019-12-21 PROCEDURE — 80061 LIPID PANEL: CPT | Performed by: INTERNAL MEDICINE

## 2019-12-21 PROCEDURE — 80053 COMPREHEN METABOLIC PANEL: CPT | Performed by: INTERNAL MEDICINE

## 2019-12-24 ENCOUNTER — TELEPHONE (OUTPATIENT)
Dept: PEDIATRICS | Facility: CLINIC | Age: 59
End: 2019-12-24

## 2019-12-24 ENCOUNTER — OFFICE VISIT (OUTPATIENT)
Dept: PEDIATRICS | Facility: CLINIC | Age: 59
End: 2019-12-24
Payer: COMMERCIAL

## 2019-12-24 VITALS
RESPIRATION RATE: 14 BRPM | TEMPERATURE: 97.3 F | HEART RATE: 56 BPM | SYSTOLIC BLOOD PRESSURE: 110 MMHG | BODY MASS INDEX: 21.08 KG/M2 | WEIGHT: 123.5 LBS | HEIGHT: 64 IN | DIASTOLIC BLOOD PRESSURE: 76 MMHG

## 2019-12-24 DIAGNOSIS — F33.1 MODERATE EPISODE OF RECURRENT MAJOR DEPRESSIVE DISORDER (H): ICD-10-CM

## 2019-12-24 DIAGNOSIS — F43.21 GRIEF REACTION: ICD-10-CM

## 2019-12-24 DIAGNOSIS — G43.109 MIGRAINE WITH AURA AND WITHOUT STATUS MIGRAINOSUS, NOT INTRACTABLE: ICD-10-CM

## 2019-12-24 DIAGNOSIS — Z72.0 TOBACCO ABUSE: Primary | ICD-10-CM

## 2019-12-24 DIAGNOSIS — E11.9 TYPE 2 DIABETES MELLITUS WITHOUT COMPLICATION, WITHOUT LONG-TERM CURRENT USE OF INSULIN (H): ICD-10-CM

## 2019-12-24 DIAGNOSIS — F41.9 ANXIETY: ICD-10-CM

## 2019-12-24 PROCEDURE — 99214 OFFICE O/P EST MOD 30 MIN: CPT | Performed by: INTERNAL MEDICINE

## 2019-12-24 RX ORDER — LISINOPRIL 5 MG/1
5 TABLET ORAL DAILY
Qty: 90 TABLET | Refills: 3 | Status: SHIPPED | OUTPATIENT
Start: 2019-12-24

## 2019-12-24 RX ORDER — LIRAGLUTIDE 6 MG/ML
1.8 INJECTION SUBCUTANEOUS DAILY
Qty: 27 ML | Refills: 3 | Status: SHIPPED | OUTPATIENT
Start: 2019-12-24

## 2019-12-24 RX ORDER — ATORVASTATIN CALCIUM 20 MG/1
20 TABLET, FILM COATED ORAL DAILY
Qty: 90 TABLET | Refills: 3 | Status: SHIPPED | OUTPATIENT
Start: 2019-12-24

## 2019-12-24 ASSESSMENT — MIFFLIN-ST. JEOR: SCORE: 1134.16

## 2019-12-24 NOTE — PATIENT INSTRUCTIONS
Diabetes looks good right now. No changes to your diabetes medications right now. Follow-up with me in 3 months.    Start lisinopril 5mg to protect your kidneys. This can cause your blood pressure to drop, so let me know if you are woozy or lightheaded. Side effects are cough and rare allergic reaction.     Let me know if there's anything that I can do to help out with everything around Hank's passing.

## 2019-12-24 NOTE — LETTER
December 24, 2019      [place]harati [place]arreal  [address] [place] [address]        To Whom It May Concern:    Bharati Villarreal  was seen on 12-24-19.  Please excuse her  until 12-27-19 due to illness.        Sincerely,        Opal Freedman MD

## 2019-12-24 NOTE — PROGRESS NOTES
Subjective     Bharati Villarreal is a 59 year old female who presents to clinic today for the following health issues:    HPI   Diabetes Follow-up    How often are you checking your blood sugar? Three times daily  Blood sugar testing frequency justification:  Risk of hypoglycemia with medication(s)  What time of day are you checking your blood sugars (select all that apply)?  Before and after meals and At bedtime  Have you had any blood sugars above 200?  Yes one day had BG >300  Have you had any blood sugars below 70?  No    What symptoms do you notice when your blood sugar is low?  None    What concerns do you have today about your diabetes? None     Do you have any of these symptoms? (Select all that apply)  Numbness in feet and Excessive thirst     Have you had a diabetic eye exam in the last 12 months? No    BP Readings from Last 2 Encounters:   19 110/76   19 128/78     Hemoglobin A1C (%)   Date Value   2019 6.9 (H)   2019 8.1 (H)     LDL Cholesterol Calculated (mg/dL)   Date Value   2019 25   2018 34       Diabetes Management Resources    Hyperlipidemia Follow-Up      Are you regularly taking any medication or supplement to lower your cholesterol?   Yes- Atorvastatin    Are you having muscle aches or other side effects that you think could be caused by your cholesterol lowering medication?  No      How many servings of fruits and vegetables do you eat daily?  0-1    On average, how many sweetened beverages do you drink each day (Examples: soda, juice, sweet tea, etc.  Do NOT count diet or artificially sweetened beverages)?   0    How many days per week do you miss taking your medication? 0    Bharati comes in for follow-up of her diabetes. She has been taking her medications well without issues, no side effects. Has been drinking a lot of diet tea, which she thinks may be helping things.     Her fiance, Hank,  somewhat unexpectedly about a month ago (was in a rehab facility  following admission to ICU for CHF exacerbation), she has been struggling with this. Is seeing her therapist regularly, and sees her psychiatrist every 2 weeks. No changes to her medications. Did get a new puppy which is approved as her emotional support animal, which is somewhat helpful. Sister is living with her right now.     Does want to quit smoking, but not right now.     Patient Active Problem List   Diagnosis     Insomnia     GERD (gastroesophageal reflux disease)     Hyperlipidemia with target LDL less than 100     Anxiety     Psoriasis     Tobacco abuse     Moderate episode of recurrent major depressive disorder (H)     Migraine with aura and without status migrainosus, not intractable     Type 2 diabetes mellitus without complication, without long-term current use of insulin (H)     Past Surgical History:   Procedure Laterality Date     ARTHROSCOPY SHOULDER RT/LT      forzen should repair RIGHT     AS ABLATION, ENDOMETRIAL, THERMAL, W/O HYSTEROSCOPIC GUIDANCE       OOPHORECTOMY         Social History     Tobacco Use     Smoking status: Current Every Day Smoker     Packs/day: 0.00     Years: 35.00     Pack years: 0.00     Types: Cigarettes     Smokeless tobacco: Never Used     Tobacco comment: 1/2 PPD - started at age 14   Substance Use Topics     Alcohol use: No     Alcohol/week: 0.0 standard drinks     Family History   Problem Relation Age of Onset     Diabetes Other      Cancer - colorectal Father 72         of throat cancer  smoker      Cancer Father      Other Cancer Father      Family History Negative Mother      Cancer Maternal Grandmother      Colon Cancer Paternal Grandmother            Reviewed and updated as needed this visit by Provider         Review of Systems   ROS COMP: Constitutional, HEENT, cardiovascular, pulmonary, gi and gu systems are negative, except as otherwise noted.      Objective    /76 (BP Location: Right arm, Patient Position: Sitting, Cuff Size: Adult Regular)    "Pulse 56   Temp 97.3  F (36.3  C) (Tympanic)   Resp 14   Ht 1.632 m (5' 4.25\")   Wt 56 kg (123 lb 8 oz)   LMP  (LMP Unknown)   BMI 21.03 kg/m    Body mass index is 21.03 kg/m .  Physical Exam   GENERAL: healthy, alert and no distress  PSYCH: mentation appears normal and tearful    Diagnostic Test Results:  Labs reviewed in Epic        Assessment & Plan       ICD-10-CM    1. Tobacco abuse Z72.0    2. Type 2 diabetes mellitus without complication, without long-term current use of insulin (H) E11.9 atorvastatin (LIPITOR) 20 MG tablet     liraglutide (VICTOZA PEN) 18 MG/3ML solution     metFORMIN (GLUCOPHAGE) 500 MG tablet     lisinopril (PRINIVIL/ZESTRIL) 5 MG tablet     **Basic metabolic panel FUTURE 1yr   3. Moderate episode of recurrent major depressive disorder (H) F33.1    4. Anxiety F41.9    5. Grief reaction F43.21      DM actually quite well controlled with HgbA1c of 6.9, will continue medications without change. Does have proteinuria, so will start low dose ACE-I. Reviewed side effects, follow-up in 2-4 weeks for BMP.     Discussed grief reaction in hx of depression, anxiety with recent unexpected death of lydia. She is working closely with therapist, psychiatrist. Offered support, she will continue to follow-up if she needs additional help with this. Feels like she has appropriate wrap around support from family, friends at this time.     Tobacco Cessation:   reports that she has been smoking cigarettes. She has been smoking about 0.00 packs per day for the past 35.00 years. She has never used smokeless tobacco.          Patient Instructions   Diabetes looks good right now. No changes to your diabetes medications right now. Follow-up with me in 3 months.    Start lisinopril 5mg to protect your kidneys. This can cause your blood pressure to drop, so let me know if you are woozy or lightheaded. Side effects are cough and rare allergic reaction.     Let me know if there's anything that I can do to help " out with everything around Hank's passing.       No follow-ups on file.    Opal Dietz MD  [facility] [place]

## 2019-12-24 NOTE — TELEPHONE ENCOUNTER
Prior Authorization Retail Medication Request    Medication/Dose: Victoza 18mg/3ml pen injector  ICD code (if different than what is on RX):  E11.9  Previously Tried and Failed:  Invokana, Bydurion, Trulicity  Rationale:  Has tried and failed 3 other meds    Insurance Name:  [facility]  Insurance ID:  [id number]      Pharmacy Information (if different than what is on RX)  Name:  [facility]  Phone:  [phone]

## 2019-12-26 RX ORDER — NAPROXEN 500 MG/1
TABLET ORAL
Qty: 60 TABLET | Refills: 0 | Status: SHIPPED | OUTPATIENT
Start: 2019-12-26 | End: 2020-01-20

## 2019-12-26 NOTE — TELEPHONE ENCOUNTER
Prior Authorization Not Needed per Insurance    Medication: Victoza 18mg/3ml pen injector  Insurance Company: [facility] - Phone [phone] Fax [phone]  Expected CoPay:      Pharmacy Filling the Rx: [id number] - [place], [place] - [address] AT [address]  Pharmacy Notified: Yes  Patient Notified: Yes

## 2019-12-26 NOTE — TELEPHONE ENCOUNTER
"Failed protocol    Requested Prescriptions   Pending Prescriptions Disp Refills     naproxen (NAPROSYN) 500 MG tablet [Pharmacy Med Name: NAPROXEN 500MG TABLETS] 60 tablet 0     Sig: TAKE 1 TABLET(500 MG) BY MOUTH TWICE DAILY AS NEEDED FOR MODERATE PAIN       NSAID Medications Failed - 12/24/2019  3:03 PM        Failed - Normal CBC on file in past 12 months     Recent Labs   Lab Test 08/12/15  0827 05/04/15  1534   WBC  --  8.4   RBC  --  4.84   HGB 14.1 14.0   HCT  --  41.6   PLT  --  276                 Passed - Blood pressure under 140/90 in past 12 months     BP Readings from Last 3 Encounters:   12/24/19 110/76   11/18/19 128/78   09/30/19 110/70                 Passed - Normal ALT on file in past 12 months     Recent Labs   Lab Test 12/21/19  0953   ALT 40             Passed - Normal AST on file in past 12 months     Recent Labs   Lab Test 12/21/19  0953   AST 31             Passed - Recent (12 mo) or future (30 days) visit within the authorizing provider's specialty     Patient has had an office visit with the authorizing provider or a provider within the authorizing providers department within the previous 12 mos or has a future within next 30 days. See \"Patient Info\" tab in inbasket, or \"Choose Columns\" in Meds & Orders section of the refill encounter.              Passed - Patient is age 6-64 years        Passed - Medication is active on med list        Passed - No active pregnancy on record        Passed - Normal serum creatinine on file in past 12 months     Recent Labs   Lab Test 12/21/19  0953   CR 0.68             Passed - No positive pregnancy test in past 12 months          "

## 2019-12-26 NOTE — TELEPHONE ENCOUNTER
Central Prior Authorization Team   Phone: [phone]      PA Initiation    Medication: Victoza 18mg/3ml pen injector  Insurance Company: [employer] [place] - Phone [phone] Fax [phone]  [facility]arm[facility] the Rx[id number] - [place], [place] - [address]  Filling Pharmacy Phone: [phone]  Filling Pharmacy Fax:    Start Date: 12/26/2019

## 2020-01-19 DIAGNOSIS — G43.109 MIGRAINE WITH AURA AND WITHOUT STATUS MIGRAINOSUS, NOT INTRACTABLE: ICD-10-CM

## 2020-01-20 RX ORDER — NAPROXEN 500 MG/1
TABLET ORAL
Qty: 60 TABLET | Refills: 0 | Status: SHIPPED | OUTPATIENT
Start: 2020-01-20 | End: 2020-03-11

## 2020-01-21 DIAGNOSIS — E11.9 TYPE 2 DIABETES MELLITUS WITHOUT COMPLICATION, WITHOUT LONG-TERM CURRENT USE OF INSULIN (H): ICD-10-CM

## 2020-01-21 NOTE — TELEPHONE ENCOUNTER
"Requested Prescriptions   Pending Prescriptions Disp Refills     REGINAUVIA 100 MG tablet [Pharmacy Med Name: JANUVIA 100MG TABLETS] 90 tablet 3     Sig: TAKE 1 TABLET(100 MG) BY MOUTH DAILY       DPP4 Inhibitors Protocol Failed - 1/21/2020  1:06 PM        Failed - Medication is active on med list        Passed - Blood pressure less than 140/90 in past 6 months     BP Readings from Last 3 Encounters:   12/24/19 110/76   11/18/19 128/78   09/30/19 110/70                 Passed - LDL on file in past 12 months     Recent Labs   Lab Test 12/21/19  0953   LDL 25             Passed - Microalbumin on file in past 12 months     Recent Labs   Lab Test 12/21/19  0953  08/08/13   MICROALB  --   --  15.6   MICROL 20   < >  --    UMALCR 44.72*   < > 7.6    < > = values in this interval not displayed.             Passed - HgbA1C in past 3 or 6 months     If HgbA1C is 8 or greater, it needs to be on file within the past 3 months.  If less than 8, must be on file within the past 6 months.     Recent Labs   Lab Test 12/21/19  0953   A1C 6.9*             Passed - Patient is age 18 or older        Passed - No active pregnancy on record        Passed - Normal serum creatinine in past 12 months     Recent Labs   Lab Test 12/21/19  0953   CR 0.68             Passed - No positive pregnancy test in past 12 months        Passed - Recent (6 mo) or future (30 days) visit within the authorizing provider's specialty     Patient had office visit in the last 6 months or has a visit in the next 30 days with authorizing provider.  See \"Patient Info\" tab in inbasket, or \"Choose Columns\" in Meds & Orders section of the refill encounter.            Routing refill request to provider for review/approval because:  Drug not active on patient's medication list        "

## 2020-01-22 RX ORDER — SITAGLIPTIN 100 MG/1
TABLET, FILM COATED ORAL
Qty: 90 TABLET | Refills: 3 | OUTPATIENT
Start: 2020-01-22

## 2020-01-22 NOTE — TELEPHONE ENCOUNTER
Can we check with patient? She should be off Januvia as she is now taking Victoza (these medications shouldn't be combined).    Opal Freedman MD  Internal Medicine-Pediatrics

## 2020-01-22 NOTE — TELEPHONE ENCOUNTER
Spoke with the Pt. She is OFF the Januvia. This was an automatic refill request from the pharmacy. Denial sent to pharmacy with note detailed the med is D/Cd.     Chanda Melissa RN -- [facility]

## 2020-03-04 ENCOUNTER — TELEPHONE (OUTPATIENT)
Dept: PEDIATRICS | Facility: CLINIC | Age: 60
End: 2020-03-04

## 2020-03-04 NOTE — TELEPHONE ENCOUNTER
Prior Authorization Retail Medication Request    Medication/Dose: liraglutide (VICTOZA PEN) 18 MG/3ML solution  ICD code (if different than what is on RX):  E11.9  Previously Tried and Failed:  Bydurion, Trulicity  Rationale:  Also taking Glucophage and Invokana    Insurance Name:  [facility]  Insurance ID:  [id number]      Pharmacy Information (if different than what is on RX)  Name:  [facility]  Phone:  [phone]

## 2020-03-06 NOTE — TELEPHONE ENCOUNTER
Prior Authorization Approval    Authorization Effective Date: 11/5/2019  Authorization Expiration Date: 11/5/2020  Medication: liraglutide (VICTOZA PEN) 18 MG/3ML solution  Approved Dose/Quantity:    Reference #:     Insurance Company: [employer] [place] - Phone [phone] Fax [phone]  Expected CoPay:       CoPay Card Available:      Foundation Assistance Needed:    Which Pharmacy is filling the prescription (Not needed for infusion/clinic administered): [id number] - [place], [place] - [address]  Pharmacy Notified: Yes  Patient Notified: Yes **Instructed pharmacy to notify patient when script is ready to /ship.**

## 2020-03-06 NOTE — TELEPHONE ENCOUNTER
Central Prior Authorization Team   Phone: [phone]      PA Initiation    Medication: liraglutide (VICTOZA PEN) 18 MG/3ML solution  Insurance Company: [employer] [place] - Phone [phone] Fax [phone]  Pharmacy Filling the Rx: [id number] - [place], [place] - [address]  Filling Pharmacy Phone: [phone]  Filling Pharmacy Fax:    Start Date: 3/6/2020

## 2020-03-08 DIAGNOSIS — G43.109 MIGRAINE WITH AURA AND WITHOUT STATUS MIGRAINOSUS, NOT INTRACTABLE: ICD-10-CM

## 2020-03-08 DIAGNOSIS — K21.9 GASTROESOPHAGEAL REFLUX DISEASE WITHOUT ESOPHAGITIS: Primary | ICD-10-CM

## 2020-03-10 NOTE — TELEPHONE ENCOUNTER
"Omeprazole refilled per protocol.    Naprosyn: routing to provider for review per protocol as pt's last CBC was in 2015.  LOV was 12/24/19    Requested Prescriptions   Pending Prescriptions Disp Refills     omeprazole (PRILOSEC) 20 MG DR capsule [Pharmacy Med Name: OMEPRAZOLE 20MG CAPSULES] 90 capsule 0     Sig: TAKE 1 CAPSULE(20 MG) BY MOUTH DAILY       PPI Protocol Passed - 3/8/2020  5:38 AM        Passed - Not on Clopidogrel (unless Pantoprazole ordered)        Passed - No diagnosis of osteoporosis on record        Passed - Recent (12 mo) or future (30 days) visit within the authorizing provider's specialty     Patient has had an office visit with the authorizing provider or a provider within the authorizing providers department within the previous 12 mos or has a future within next 30 days. See \"Patient Info\" tab in inbasket, or \"Choose Columns\" in Meds & Orders section of the refill encounter.              Passed - Medication is active on med list        Passed - Patient is age 18 or older        Passed - No active pregnacy on record        Passed - No positive pregnancy test in past 12 months           naproxen (NAPROSYN) 500 MG tablet [Pharmacy Med Name: NAPROXEN 500MG TABLETS] 60 tablet 0     Sig: TAKE 1 TABLET(500 MG) BY MOUTH TWICE DAILY AS NEEDED FOR MODERATE PAIN       NSAID Medications Failed - 3/8/2020  5:38 AM        Failed - Normal CBC on file in past 12 months     Recent Labs   Lab Test 08/12/15  0827 05/04/15  1534   WBC  --  8.4   RBC  --  4.84   HGB 14.1 14.0   HCT  --  41.6   PLT  --  276                 Passed - Blood pressure under 140/90 in past 12 months     BP Readings from Last 3 Encounters:   12/24/19 110/76   11/18/19 128/78   09/30/19 110/70                 Passed - Normal ALT on file in past 12 months     Recent Labs   Lab Test 12/21/19  0953   ALT 40             Passed - Normal AST on file in past 12 months     Recent Labs   Lab Test 12/21/19  0953   AST 31             Passed - Recent " "(12 mo) or future (30 days) visit within the authorizing provider's specialty     Patient has had an office visit with the authorizing provider or a provider within the authorizing providers department within the previous 12 mos or has a future within next 30 days. See \"Patient Info\" tab in inbasket, or \"Choose Columns\" in Meds & Orders section of the refill encounter.              Passed - Patient is age 6-64 years        Passed - Medication is active on med list        Passed - No active pregnancy on record        Passed - Normal serum creatinine on file in past 12 months     Recent Labs   Lab Test 12/21/19  0953   CR 0.68             Passed - No positive pregnancy test in past 12 months            "

## 2020-03-11 RX ORDER — NAPROXEN 500 MG/1
TABLET ORAL
Qty: 60 TABLET | Refills: 0 | Status: SHIPPED | OUTPATIENT
Start: 2020-03-11 | End: 2020-04-08

## 2020-03-28 NOTE — PROGRESS NOTES
SUBJECTIVE:   Bharati Villarreal is a 58 year old female who presents to clinic today for the following health issues:    1. Diabetes: blood sugars continue to be high despite taking her metformin and Invokana. She reports that these are often over 200. Has been trying to cut back on sodas, but does find this hard to do.     2. Depression: Reports that she is doing well from this standpoint. Outpatient intensive therapy has gone well. She is restarting work part time this week, and so far it seems to be going well. Feels that medications keep her symptoms under good control.     3. L thumb locking: Has noticed pain and locking in the distal joint of her L thumb. Can bend it (it will click) but cannot fully straighten this without using her other hand. Starting to get painful.     Problem list and histories reviewed & adjusted, as indicated.  Additional history: as documented    Patient Active Problem List   Diagnosis     Insomnia     GERD (gastroesophageal reflux disease)     Hyperlipidemia with target LDL less than 100     Anxiety     Psoriasis     Tobacco abuse     Moderate episode of recurrent major depressive disorder (H)     Migraine with aura and without status migrainosus, not intractable     Type 2 diabetes mellitus without complication, without long-term current use of insulin (H)     Past Surgical History:   Procedure Laterality Date     ARTHROSCOPY SHOULDER RT/LT      forzen should repair RIGHT     AS ABLATION, ENDOMETRIAL, THERMAL, W/O HYSTEROSCOPIC GUIDANCE       OOPHORECTOMY         Social History   Substance Use Topics     Smoking status: Current Every Day Smoker     Packs/day: 0.00     Years: 35.00     Types: Cigarettes     Smokeless tobacco: Never Used      Comment:  PPD - started at age 14     Alcohol use No     Family History   Problem Relation Age of Onset     Diabetes Other      Cancer - colorectal Father 72      of throat cancer  smoker      Cancer Father      Other Cancer Father      Family  "History Negative Mother      Cancer Maternal Grandmother      Colon Cancer Paternal Grandmother            Reviewed and updated as needed this visit by clinical staff  Tobacco  Allergies  Meds  Med Hx  Soc Hx        ROS:  Constitutional, GI, endo, MSK, psych, neuro systems are negative, except as otherwise noted.    OBJECTIVE:     /68 (BP Location: Right arm, Patient Position: Chair, Cuff Size: Adult Large)  Pulse 97  Temp 98.5  F (36.9  C) (Tympanic)  Ht 5' 4.75\" (1.645 m)  Wt 130 lb 3 oz (59.1 kg)  LMP  (LMP Unknown)  SpO2 99%  Breastfeeding? No  BMI 21.83 kg/m2  Body mass index is 21.83 kg/(m^2).  GENERAL: healthy, alert and no distress  MS: L thumb with locking of distal joint after flexion; cannot fully extend joint. No erythema or swelling.   PSYCH: mentation appears normal, affect normal/bright      ASSESSMENT/PLAN:     1. Type 2 diabetes mellitus without complication, without long-term current use of insulin (H)  Continues to be poorly controlled. Discussed additional medication options; patient elected to try GLP-1 agonist. Reviewed medication risks, benefits. She will discuss injection technique with her pharmacist. Reviewed that we may need to adjust medication based on insurance coverage. Will start with 1/2 strength max dose and increase to full 1.5mg dose if tolerating well.   - dulaglutide (TRULICITY) 0.75 MG/0.5ML pen; Inject 0.75 mg Subcutaneous every 7 days for 4 doses  Dispense: 2 mL; Refill: 0  - Basic metabolic panel; Future  - Albumin Random Urine Quantitative with Creat Ratio; Future  - **TSH with free T4 reflex FUTURE anytime; Future    2. Trigger finger, acquired  Will send to hand specialist.   - ORTHO  REFERRAL    3. Encounter for screening mammogram for breast cancer  - *MA Screening Digital Bilateral; Future    4. Moderate episode of recurrent major depressive disorder (H)  Improving control with medications, therapy. Continues to follow with psychiatry and " therapy.       Patient Instructions   Diabetes:  -- continue Invokana and metformin as you are  -- start Trulicity injections once per week. Go over this with the pharmacist in detail about how administer.   -- send me a [employer] message in 3 weeks with your blood sugars. We will probably need to go up on the Trulicity dose at that point    Trigger finger  -- follow up with Orthopedic specialist (hand specialist at [facility])    Keep me posted about your mood and how things are going.    Follow-up in about 3 months.       Opal Dietz MD  [facility]   Calm

## 2020-04-07 DIAGNOSIS — G43.109 MIGRAINE WITH AURA AND WITHOUT STATUS MIGRAINOSUS, NOT INTRACTABLE: ICD-10-CM

## 2020-04-07 NOTE — TELEPHONE ENCOUNTER
naproxen (NAPROSYN) 500 MG tablet        Last written prescription date: 3/11/20        Last fill quantity: 60, # refills: 0        Last office visit: 12/24/19        Future office visit: N/A        Is this a controlled substance?  No       Routing refill request to provider for review/approval because: Needs CBC    Lab Results   Component Value Date    WBC 8.4 05/04/2015     Lab Results   Component Value Date    RBC 4.84 05/04/2015     Lab Results   Component Value Date    HGB 14.1 08/12/2015     Lab Results   Component Value Date    HCT 41.6 05/04/2015     No components found for: MCT  Lab Results   Component Value Date    MCV 86 05/04/2015     Lab Results   Component Value Date    MCH 28.9 05/04/2015     Lab Results   Component Value Date    MCHC 33.7 05/04/2015     Lab Results   Component Value Date    RDW 14.3 05/04/2015     Lab Results   Component Value Date     05/04/2015     Anastasia BRISCOE RN, BSN

## 2020-04-08 RX ORDER — NAPROXEN 500 MG/1
TABLET ORAL
Qty: 60 TABLET | Refills: 0 | Status: SHIPPED | OUTPATIENT
Start: 2020-04-08 | End: 2020-05-08

## 2020-05-07 DIAGNOSIS — G43.109 MIGRAINE WITH AURA AND WITHOUT STATUS MIGRAINOSUS, NOT INTRACTABLE: ICD-10-CM

## 2020-05-08 RX ORDER — NAPROXEN 500 MG/1
TABLET ORAL
Qty: 60 TABLET | Refills: 0 | Status: SHIPPED | OUTPATIENT
Start: 2020-05-08 | End: 2020-06-08

## 2020-05-08 NOTE — TELEPHONE ENCOUNTER
Routing refill request to provider for review/approval because:  Labs not current:  CBC    Angela Alegria RN on 5/8/2020 at 11:28 AM

## 2020-06-06 DIAGNOSIS — G43.109 MIGRAINE WITH AURA AND WITHOUT STATUS MIGRAINOSUS, NOT INTRACTABLE: ICD-10-CM

## 2020-06-06 NOTE — TELEPHONE ENCOUNTER
"Requested Prescriptions   Pending Prescriptions Disp Refills     naproxen (NAPROSYN) 500 MG tablet [Pharmacy Med Name: NAPROXEN 500MG TABLETS]    Last Written Prescription Date:  5/8/2020  Last Fill Quantity: 60 tablet,  # refills: 0   Last office visit: 12/24/2019 with prescribing provider:  Tano   Future Office Visit:     60 tablet        Sig: TAKE 1 TABLET(500 MG) BY MOUTH TWICE DAILY AS NEEDED FOR MODERATE PAIN       NSAID Medications Failed - 6/6/2020  5:52 AM        Failed - Normal CBC on file in past 12 months     Recent Labs   Lab Test 08/12/15  0827 05/04/15  1534   WBC  --  8.4   RBC  --  4.84   HGB 14.1 14.0   HCT  --  41.6   PLT  --  276                 Passed - Blood pressure under 140/90 in past 12 months     BP Readings from Last 3 Encounters:   12/24/19 110/76   11/18/19 128/78   09/30/19 110/70                 Passed - Normal ALT on file in past 12 months     Recent Labs   Lab Test 12/21/19  0953   ALT 40             Passed - Normal AST on file in past 12 months     Recent Labs   Lab Test 12/21/19  0953   AST 31             Passed - Recent (12 mo) or future (30 days) visit within the authorizing provider's specialty     Patient has had an office visit with the authorizing provider or a provider within the authorizing providers department within the previous 12 mos or has a future within next 30 days. See \"Patient Info\" tab in inbasket, or \"Choose Columns\" in Meds & Orders section of the refill encounter.              Passed - Patient is age 6-64 years        Passed - Medication is active on med list        Passed - No active pregnancy on record        Passed - Normal serum creatinine on file in past 12 months     Recent Labs   Lab Test 12/21/19  0953   CR 0.68       Ok to refill medication if creatinine is low          Passed - No positive pregnancy test in past 12 months           "

## 2020-06-08 RX ORDER — NAPROXEN 500 MG/1
TABLET ORAL
Qty: 60 TABLET | Refills: 0 | Status: SHIPPED | OUTPATIENT
Start: 2020-06-08 | End: 2020-07-06

## 2020-06-08 NOTE — TELEPHONE ENCOUNTER
Routing refill request to provider for review/approval because:  Labs out of range:  CBC    Chanda Melissa, RN   [facility] -- Triage Nurse

## 2020-07-06 DIAGNOSIS — G43.109 MIGRAINE WITH AURA AND WITHOUT STATUS MIGRAINOSUS, NOT INTRACTABLE: ICD-10-CM

## 2020-07-06 RX ORDER — NAPROXEN 500 MG/1
TABLET ORAL
Qty: 60 TABLET | Refills: 0 | Status: SHIPPED | OUTPATIENT
Start: 2020-07-06 | End: 2020-08-05

## 2020-07-06 NOTE — TELEPHONE ENCOUNTER
Routing refill request to provider for review/approval because:  Ok for ongoing usage?    Chanda Melissa, RN   [facility] -- Triage Nurse

## 2020-07-27 DIAGNOSIS — E11.9 TYPE 2 DIABETES MELLITUS WITHOUT COMPLICATION, WITHOUT LONG-TERM CURRENT USE OF INSULIN (H): ICD-10-CM

## 2020-07-27 RX ORDER — FLASH GLUCOSE SCANNING READER
EACH MISCELLANEOUS
Qty: 1 EACH | Refills: 11 | Status: SHIPPED | OUTPATIENT
Start: 2020-07-27

## 2020-08-05 DIAGNOSIS — G43.109 MIGRAINE WITH AURA AND WITHOUT STATUS MIGRAINOSUS, NOT INTRACTABLE: ICD-10-CM

## 2020-08-05 RX ORDER — NAPROXEN 500 MG/1
TABLET ORAL
Qty: 60 TABLET | Refills: 0 | Status: SHIPPED | OUTPATIENT
Start: 2020-08-05

## 2020-08-05 NOTE — TELEPHONE ENCOUNTER
Routing refill request to provider for review/approval because:  Labs not current:  Needs yearly CBC per RN protocol    Karlee Mike RN

## 2020-08-26 ENCOUNTER — TELEPHONE (OUTPATIENT)
Dept: PHARMACY | Facility: CLINIC | Age: 60
End: 2020-08-26

## 2020-08-26 NOTE — TELEPHONE ENCOUNTER
Panel management - pt is overdue for PCP visit (DM follow-ups). Also consider MTM appointment as well, overdue.    Routing to team to assist with schedule MTM and PCP visit. (Please schedule MTM virtual call prior to PCP visit).    Augusta Nelson, PharmD  Medication Therapy Management Pharmacist

## 2020-09-02 NOTE — TELEPHONE ENCOUNTER
CHG call to assist with scheduling MTM PCP covisit. No answer, LVM asking to call back on CHG direct extension.    Attempt #1    Rick Weber at 2:06 PM on 9/2/2020  [facility] Health Guide  Phone [phone]

## 2020-09-09 NOTE — TELEPHONE ENCOUNTER
CHG talked patient and is informed that the patient had moved down to [place] about 4 months ago and has found a new provider.    CHG will inform PCP and MTM.    Rick Weber at 1:14 PM on 9/9/2020  [facility] Clinic Health Guide  Phone [phone]

## 2020-10-07 ENCOUNTER — TELEPHONE (OUTPATIENT)
Dept: PEDIATRICS | Facility: CLINIC | Age: 60
End: 2020-10-07

## 2020-10-07 NOTE — TELEPHONE ENCOUNTER
Central Prior Authorization Team   Phone: [phone]      PA Initiation    Medication: liraglutide (VICTOZA PEN) 18 MG/3ML solution-Initiated  Insurance Company: Other (see comments)Comment:  [employer] [phone]  Pharmacy Filling the Rx: [id number] - [place], [place] - [address]  Filling Pharmacy Phone: [phone]  Filling Pharmacy Fax:    Start Date: 10/7/2020

## 2020-10-07 NOTE — TELEPHONE ENCOUNTER
Prior Authorization Approval    Authorization Effective Date: 11/6/2020  Authorization Expiration Date: 11/6/2021  Medication: liraglutide (VICTOZA PEN) 18 MG/3ML solution-APPROVED  Approved Dose/Quantity:   Reference #:     Insurance Company: Other (see comments)Comment:   [phone]  Expected CoPay:       CoPay Card Available:      Foundation Assistance Needed:    Which Pharmacy is filling the prescription (Not needed for infusion/clinic administered): [id number] - [place], [place] - [address]  Pharmacy Notified: Yes  Patient Notified: No

## 2020-12-11 DIAGNOSIS — E11.9 TYPE 2 DIABETES MELLITUS WITHOUT COMPLICATION, WITHOUT LONG-TERM CURRENT USE OF INSULIN (H): ICD-10-CM

## 2020-12-11 RX ORDER — PEN NEEDLE, DIABETIC 31 GX5/16"
NEEDLE, DISPOSABLE MISCELLANEOUS
Qty: 100 EACH | Refills: 0 | Status: SHIPPED | OUTPATIENT
Start: 2020-12-11

## 2020-12-11 NOTE — TELEPHONE ENCOUNTER
Routing refill request to provider for review/approval because:  Patient needs to be seen because it has been 1 year since last office visit.    Sandrine Zapata RN

## 2020-12-11 NOTE — TELEPHONE ENCOUNTER
Called pt. Pt declines scheduling an appointment as she is no longer living in [place] and is established in [place] with a new PCP.    David Jackson, EMT at 3:06 PM on December 11, 2020   [facility] Health Guide   [phone]

## 2021-01-17 DIAGNOSIS — E11.9 TYPE 2 DIABETES MELLITUS WITHOUT COMPLICATION, WITHOUT LONG-TERM CURRENT USE OF INSULIN (H): ICD-10-CM

## 2021-01-19 NOTE — TELEPHONE ENCOUNTER
Called patient and left a message informing her that Rx has been completed, but that she will need an appointment prior to any additional refills. Requested a kamini back to schedule.  Provided clinic number.      Paty Ha

## 2021-01-19 NOTE — TELEPHONE ENCOUNTER
Patient given one month supply of meds. She is overdue for appointment. Schedule with a PMD or resident.     Vanessa Calle PA-C

## 2021-01-19 NOTE — TELEPHONE ENCOUNTER
Routing refill request to provider for review/approval because:  Labs not current:  A1C, CR   Patient needs to be seen because:  Needs 6 month f/u    Anastasia Mohr RN on 1/19/2021 at 9:15 AM

## 2021-01-25 NOTE — TELEPHONE ENCOUNTER
Called and spoke with patient. She has relocated to [place] and is seeing a new provider there. Do not send any further refill request.

## 2021-06-20 DIAGNOSIS — E11.9 TYPE 2 DIABETES MELLITUS WITHOUT COMPLICATION, WITHOUT LONG-TERM CURRENT USE OF INSULIN (H): ICD-10-CM

## 2021-06-20 RX ORDER — PEN NEEDLE, DIABETIC 31 GX5/16"
NEEDLE, DISPOSABLE MISCELLANEOUS
Qty: 100 EACH | Refills: 0 | Status: CANCELLED | OUTPATIENT
Start: 2021-06-20

## 2021-06-22 NOTE — TELEPHONE ENCOUNTER
See previous refill encounters from 12/11/20 and 1/17/21. Patient relocated to [place] and is seeing a new provider. Removed med and closed encounter.

## 2021-06-22 NOTE — TELEPHONE ENCOUNTER
Please call patient and request that they schedule an establish care visit with a provider for refills of their insulin pen needles.     Angela Alegria RN on 6/22/2021 at 7:49 AM

## 2022-03-17 NOTE — TELEPHONE ENCOUNTER
"Last office visit 12/24/2019.    naproxen (NAPROSYN) 500 MG tablet 60 tablet 0 12/26/2019     Routing refill request to provider for review/approval because:  Labs not current:  CBC          Requested Prescriptions   Pending Prescriptions Disp Refills     naproxen (NAPROSYN) 500 MG tablet [Pharmacy Med Name: NAPROXEN 500MG TABLETS] 60 tablet 0     Sig: TAKE 1 TABLET(500 MG) BY MOUTH TWICE DAILY AS NEEDED FOR MODERATE PAIN       NSAID Medications Failed - 1/19/2020 10:33 AM        Failed - Normal CBC on file in past 12 months     Recent Labs   Lab Test 08/12/15  0827 05/04/15  1534   WBC  --  8.4   RBC  --  4.84   HGB 14.1 14.0   HCT  --  41.6   PLT  --  276                 Passed - Blood pressure under 140/90 in past 12 months     BP Readings from Last 3 Encounters:   12/24/19 110/76   11/18/19 128/78   09/30/19 110/70                 Passed - Normal ALT on file in past 12 months     Recent Labs   Lab Test 12/21/19  0953   ALT 40             Passed - Normal AST on file in past 12 months     Recent Labs   Lab Test 12/21/19  0953   AST 31             Passed - Recent (12 mo) or future (30 days) visit within the authorizing provider's specialty     Patient has had an office visit with the authorizing provider or a provider within the authorizing providers department within the previous 12 mos or has a future within next 30 days. See \"Patient Info\" tab in inbasket, or \"Choose Columns\" in Meds & Orders section of the refill encounter.              Passed - Patient is age 6-64 years        Passed - Medication is active on med list        Passed - No active pregnancy on record        Passed - Normal serum creatinine on file in past 12 months     Recent Labs   Lab Test 12/21/19  0953   CR 0.68             Passed - No positive pregnancy test in past 12 months          " Gerald Barker called to request a refill on her medication. Last office visit : 2/17/2022   Next office visit : 3/24/2022     Last UDS:   Amphetamine Screen, Urine   Date Value Ref Range Status   02/04/2021 -  Final     Barbiturates   Date Value Ref Range Status   01/17/2011 Negative () Final     Barbiturate Screen, Urine   Date Value Ref Range Status   02/04/2021 -  Final     Benzodiazepine Screen, Urine   Date Value Ref Range Status   02/04/2021 +  Final     Comment:     Xanax     Buprenorphine Urine   Date Value Ref Range Status   02/04/2021 -  Final     Cocaine Metabolite Screen, Urine   Date Value Ref Range Status   02/04/2021 -  Final     Gabapentin Screen, Urine   Date Value Ref Range Status   02/04/2021 -  Final     MDMA, Urine   Date Value Ref Range Status   02/04/2021 -  Final     Methamphetamine, Urine   Date Value Ref Range Status   02/04/2021 -  Final     Opiate Scrn, Ur   Date Value Ref Range Status   02/04/2021 -  Final     Oxycodone Screen, Ur   Date Value Ref Range Status   02/04/2021 -  Final     PCP Screen, Urine   Date Value Ref Range Status   02/04/2021 -  Final     Propoxyphene Screen, Urine   Date Value Ref Range Status   02/04/2021 -  Final     THC Screen, Urine   Date Value Ref Range Status   02/04/2021 -  Final     Tricyclic Antidepressants, Urine   Date Value Ref Range Status   02/04/2021 -  Final       Last Pinkey Rise: 3/17/22  Medication Contract: needs updated   Last Fill: 2/17/22    Requested Prescriptions     Pending Prescriptions Disp Refills    ALPRAZolam (XANAX) 0.25 MG tablet [Pharmacy Med Name: ALPRAZOLAM 0.25MG TABLETS] 30 tablet 0     Sig: TAKE 1 TABLET BY MOUTH EVERY NIGHT         Please approve or refuse this medication.    Gena Castorena